# Patient Record
Sex: FEMALE | Race: WHITE | NOT HISPANIC OR LATINO | Employment: FULL TIME | URBAN - METROPOLITAN AREA
[De-identification: names, ages, dates, MRNs, and addresses within clinical notes are randomized per-mention and may not be internally consistent; named-entity substitution may affect disease eponyms.]

---

## 2017-02-03 ENCOUNTER — NON-PROVIDER VISIT (OUTPATIENT)
Dept: URGENT CARE | Facility: CLINIC | Age: 57
End: 2017-02-03

## 2017-02-03 DIAGNOSIS — Z02.1 PRE-EMPLOYMENT DRUG SCREENING: ICD-10-CM

## 2017-02-03 LAB
AMP AMPHETAMINE: NORMAL
COC COCAINE: NORMAL
INT CON NEG: NORMAL
INT CON POS: NORMAL
OPI OPIATES: NORMAL
PCP PHENCYCLIDINE: NORMAL
POC DRUG COMMENT 753798-OCCUPATIONAL HEALTH: NEGATIVE
THC: NORMAL

## 2017-02-03 PROCEDURE — 80300 POCT 5 PANEL URINE DRUG SCREEN - INSTANT: CPT | Performed by: PHYSICIAN ASSISTANT

## 2017-11-13 ENCOUNTER — APPOINTMENT (OUTPATIENT)
Dept: RADIOLOGY | Facility: IMAGING CENTER | Age: 57
End: 2017-11-13
Attending: FAMILY MEDICINE
Payer: COMMERCIAL

## 2017-11-13 ENCOUNTER — OFFICE VISIT (OUTPATIENT)
Dept: URGENT CARE | Facility: CLINIC | Age: 57
End: 2017-11-13
Payer: COMMERCIAL

## 2017-11-13 VITALS
HEART RATE: 80 BPM | WEIGHT: 194 LBS | DIASTOLIC BLOOD PRESSURE: 85 MMHG | SYSTOLIC BLOOD PRESSURE: 130 MMHG | RESPIRATION RATE: 20 BRPM | OXYGEN SATURATION: 95 % | HEIGHT: 70 IN | BODY MASS INDEX: 27.77 KG/M2 | TEMPERATURE: 97.4 F

## 2017-11-13 DIAGNOSIS — R07.81 RIB PAIN ON LEFT SIDE: ICD-10-CM

## 2017-11-13 PROCEDURE — 71101 X-RAY EXAM UNILAT RIBS/CHEST: CPT | Mod: TC,LT | Performed by: FAMILY MEDICINE

## 2017-11-13 PROCEDURE — 99203 OFFICE O/P NEW LOW 30 MIN: CPT | Performed by: FAMILY MEDICINE

## 2017-11-13 RX ORDER — LEVOTHYROXINE SODIUM 0.07 MG/1
88 TABLET ORAL
COMMUNITY
End: 2019-07-18

## 2017-11-13 ASSESSMENT — ENCOUNTER SYMPTOMS
HEMOPTYSIS: 0
FEVER: 0
CHILLS: 0
DIZZINESS: 0
COUGH: 0
SHORTNESS OF BREATH: 0
ORTHOPNEA: 0
FOCAL WEAKNESS: 0

## 2017-11-14 NOTE — PROGRESS NOTES
"Subjective:      Lillian Addison is a 57 y.o. female who presents with Rib Injury (Couple days left rib cage pain .)    Chief Complaint   Patient presents with   • Rib Injury     Couple days left rib cage pain .        - This is a very pleasant 57 y.o. female with complaints of sitting in chair and reaching for something and felt sudden pain on Lt lateral chest. Has had constant pain since, worse w/ movement/position. No NVFC          ALLERGIES:  Codeine and Sulfa drugs     PMH:  Past Medical History:   Diagnosis Date   • Hypertension         MEDS:    Current Outpatient Prescriptions:   •  levothyroxine (SYNTHROID) 75 MCG Tab, Take 75 mcg by mouth Every morning on an empty stomach., Disp: , Rfl:   •  lisinopril (PRINIVIL) 20 MG TABS, Take 20 mg by mouth every day., Disp: , Rfl:   •  hydrocodone-acetaminophen (NORCO) 5-325 MG TABS per tablet, Take 1-2 Tabs by mouth every four hours as needed., Disp: 20 Tab, Rfl: 0    ** I have documented what I find to be significant in regards to past medical, social, family and surgical history  in my HPI or under PMH/PSH/FH review section, otherwise it is contributory **           HPI    Review of Systems   Constitutional: Negative for chills and fever.   Respiratory: Negative for cough, hemoptysis and shortness of breath.    Cardiovascular: Positive for chest pain. Negative for orthopnea.   Neurological: Negative for dizziness and focal weakness.          Objective:     /85   Pulse 80   Temp 36.3 °C (97.4 °F)   Resp 20   Ht 1.778 m (5' 10\")   Wt 88 kg (194 lb)   SpO2 95%   BMI 27.84 kg/m²      Physical Exam   Constitutional: She appears well-developed. No distress.   HENT:   Head: Normocephalic and atraumatic.   Mouth/Throat: Oropharynx is clear and moist.   Eyes: Conjunctivae are normal.   Neck: Neck supple.   Cardiovascular: Regular rhythm.    No murmur heard.  Pulmonary/Chest: Effort normal. No respiratory distress.   Neurological: She is alert. She exhibits " normal muscle tone.   Skin: Skin is warm and dry.   Psychiatric: She has a normal mood and affect. Judgment normal.   Nursing note and vitals reviewed.  + TTP lateral aspect of chest. No crepitus             Assessment/Plan:         1. Rib pain on left side  [id number] (WITH 1-VIEW CXR) LEFT         * seems MSK    - rest/Motrin     Dx & d/c instructions discussed w/ patient and/or family members. Follow up w/ Prvt Dr or here in 3-4 days if not getting better, sooner if needed,  ER if worse and UC/PCP unavailable.        Possible side effects (i.e. Rash, GI upset/constipation, sedation, elevation of BP or sugars) of any medications given discussed.

## 2018-01-16 ENCOUNTER — HOSPITAL ENCOUNTER (OUTPATIENT)
Dept: HOSPITAL 8 - CFH | Age: 58
Discharge: HOME | End: 2018-01-16
Attending: FAMILY MEDICINE
Payer: COMMERCIAL

## 2018-01-16 DIAGNOSIS — N95.1: ICD-10-CM

## 2018-01-16 DIAGNOSIS — Z13.820: Primary | ICD-10-CM

## 2018-01-16 DIAGNOSIS — E04.1: ICD-10-CM

## 2018-01-16 DIAGNOSIS — R05: ICD-10-CM

## 2018-01-16 DIAGNOSIS — R06.2: ICD-10-CM

## 2018-01-16 DIAGNOSIS — Z78.0: ICD-10-CM

## 2018-01-16 PROCEDURE — 71046 X-RAY EXAM CHEST 2 VIEWS: CPT

## 2018-01-16 PROCEDURE — 77080 DXA BONE DENSITY AXIAL: CPT

## 2018-01-16 PROCEDURE — 76536 US EXAM OF HEAD AND NECK: CPT

## 2018-11-05 ENCOUNTER — HOSPITAL ENCOUNTER (INPATIENT)
Dept: HOSPITAL 8 - ED | Age: 58
LOS: 2 days | Discharge: HOME | DRG: 193 | End: 2018-11-07
Attending: INTERNAL MEDICINE | Admitting: HOSPITALIST
Payer: COMMERCIAL

## 2018-11-05 VITALS — DIASTOLIC BLOOD PRESSURE: 86 MMHG | SYSTOLIC BLOOD PRESSURE: 148 MMHG

## 2018-11-05 VITALS — HEIGHT: 70 IN | BODY MASS INDEX: 27.21 KG/M2 | WEIGHT: 190.04 LBS

## 2018-11-05 VITALS — DIASTOLIC BLOOD PRESSURE: 90 MMHG | SYSTOLIC BLOOD PRESSURE: 158 MMHG

## 2018-11-05 DIAGNOSIS — E03.9: ICD-10-CM

## 2018-11-05 DIAGNOSIS — E66.9: ICD-10-CM

## 2018-11-05 DIAGNOSIS — Z88.2: ICD-10-CM

## 2018-11-05 DIAGNOSIS — Z80.0: ICD-10-CM

## 2018-11-05 DIAGNOSIS — F17.210: ICD-10-CM

## 2018-11-05 DIAGNOSIS — J96.01: ICD-10-CM

## 2018-11-05 DIAGNOSIS — J18.9: Primary | ICD-10-CM

## 2018-11-05 DIAGNOSIS — I10: ICD-10-CM

## 2018-11-05 DIAGNOSIS — Z82.49: ICD-10-CM

## 2018-11-05 LAB
ALBUMIN SERPL-MCNC: 3.1 G/DL (ref 3.4–5)
ALP SERPL-CCNC: 97 U/L (ref 45–117)
ALT SERPL-CCNC: 25 U/L (ref 12–78)
ANION GAP SERPL CALC-SCNC: 11 MMOL/L (ref 5–15)
BASOPHILS # BLD AUTO: 0.02 X10^3/UL (ref 0–0.1)
BASOPHILS NFR BLD AUTO: 0 % (ref 0–1)
BILIRUB SERPL-MCNC: 0.6 MG/DL (ref 0.2–1)
CALCIUM SERPL-MCNC: 8.6 MG/DL (ref 8.5–10.1)
CHLORIDE SERPL-SCNC: 106 MMOL/L (ref 98–107)
CREAT SERPL-MCNC: 0.77 MG/DL (ref 0.55–1.02)
EOSINOPHIL # BLD AUTO: 0.07 X10^3/UL (ref 0–0.4)
EOSINOPHIL NFR BLD AUTO: 0 % (ref 1–7)
ERYTHROCYTE [DISTWIDTH] IN BLOOD BY AUTOMATED COUNT: 15.6 % (ref 9.6–15.2)
LYMPHOCYTES # BLD AUTO: 0.81 X10^3/UL (ref 1–3.4)
LYMPHOCYTES NFR BLD AUTO: 5 % (ref 22–44)
MCH RBC QN AUTO: 27.5 PG (ref 27–34.8)
MCHC RBC AUTO-ENTMCNC: 32.4 G/DL (ref 32.4–35.8)
MCV RBC AUTO: 84.6 FL (ref 80–100)
MD: (no result)
MONOCYTES # BLD AUTO: 0.62 X10^3/UL (ref 0.2–0.8)
MONOCYTES NFR BLD AUTO: 4 % (ref 2–9)
NEUTROPHILS # BLD AUTO: 15.97 X10^3/UL (ref 1.8–6.8)
NEUTROPHILS NFR BLD AUTO: 91 % (ref 42–75)
PLATELET # BLD AUTO: 320 X10^3/UL (ref 130–400)
PMV BLD AUTO: 8.3 FL (ref 7.4–10.4)
PROT SERPL-MCNC: 9.2 G/DL (ref 6.4–8.2)
RBC # BLD AUTO: 5.94 X10^6/UL (ref 3.82–5.3)
TROPONIN I SERPL-MCNC: < 0.015 NG/ML (ref 0–0.04)

## 2018-11-05 PROCEDURE — 84100 ASSAY OF PHOSPHORUS: CPT

## 2018-11-05 PROCEDURE — 93306 TTE W/DOPPLER COMPLETE: CPT

## 2018-11-05 PROCEDURE — 71275 CT ANGIOGRAPHY CHEST: CPT

## 2018-11-05 PROCEDURE — 83735 ASSAY OF MAGNESIUM: CPT

## 2018-11-05 PROCEDURE — 85025 COMPLETE CBC W/AUTO DIFF WBC: CPT

## 2018-11-05 PROCEDURE — 84484 ASSAY OF TROPONIN QUANT: CPT

## 2018-11-05 PROCEDURE — 99285 EMERGENCY DEPT VISIT HI MDM: CPT

## 2018-11-05 PROCEDURE — 83605 ASSAY OF LACTIC ACID: CPT

## 2018-11-05 PROCEDURE — 93005 ELECTROCARDIOGRAM TRACING: CPT

## 2018-11-05 PROCEDURE — 96374 THER/PROPH/DIAG INJ IV PUSH: CPT

## 2018-11-05 PROCEDURE — 80053 COMPREHEN METABOLIC PANEL: CPT

## 2018-11-05 PROCEDURE — 80048 BASIC METABOLIC PNL TOTAL CA: CPT

## 2018-11-05 PROCEDURE — 85379 FIBRIN DEGRADATION QUANT: CPT

## 2018-11-05 PROCEDURE — 87400 INFLUENZA A/B EACH AG IA: CPT

## 2018-11-05 PROCEDURE — 36415 COLL VENOUS BLD VENIPUNCTURE: CPT

## 2018-11-05 PROCEDURE — 87040 BLOOD CULTURE FOR BACTERIA: CPT

## 2018-11-05 RX ADMIN — AZITHROMYCIN ONE MG: 500 TABLET, FILM COATED ORAL at 15:02

## 2018-11-05 RX ADMIN — METHYLPREDNISOLONE SODIUM SUCCINATE SCH MG: 125 INJECTION, POWDER, FOR SOLUTION INTRAMUSCULAR; INTRAVENOUS at 19:32

## 2018-11-05 RX ADMIN — ENOXAPARIN SODIUM SCH MG: 40 INJECTION SUBCUTANEOUS at 19:30

## 2018-11-05 RX ADMIN — SODIUM CHLORIDE SCH MLS/HR: 0.9 INJECTION, SOLUTION INTRAVENOUS at 16:18

## 2018-11-05 RX ADMIN — AZITHROMYCIN ONE MG: 500 TABLET, FILM COATED ORAL at 14:41

## 2018-11-06 VITALS — SYSTOLIC BLOOD PRESSURE: 123 MMHG | DIASTOLIC BLOOD PRESSURE: 75 MMHG

## 2018-11-06 VITALS — DIASTOLIC BLOOD PRESSURE: 81 MMHG | SYSTOLIC BLOOD PRESSURE: 151 MMHG

## 2018-11-06 VITALS — DIASTOLIC BLOOD PRESSURE: 78 MMHG | SYSTOLIC BLOOD PRESSURE: 132 MMHG

## 2018-11-06 VITALS — SYSTOLIC BLOOD PRESSURE: 146 MMHG | DIASTOLIC BLOOD PRESSURE: 83 MMHG

## 2018-11-06 LAB
ANION GAP SERPL CALC-SCNC: 7 MMOL/L (ref 5–15)
BASOPHILS # BLD AUTO: 0.03 X10^3/UL (ref 0–0.1)
BASOPHILS NFR BLD AUTO: 0 % (ref 0–1)
CALCIUM SERPL-MCNC: 8.5 MG/DL (ref 8.5–10.1)
CHLORIDE SERPL-SCNC: 108 MMOL/L (ref 98–107)
CREAT SERPL-MCNC: 0.67 MG/DL (ref 0.55–1.02)
EOSINOPHIL # BLD AUTO: 0 X10^3/UL (ref 0–0.4)
EOSINOPHIL NFR BLD AUTO: 0 % (ref 1–7)
ERYTHROCYTE [DISTWIDTH] IN BLOOD BY AUTOMATED COUNT: 15.3 % (ref 9.6–15.2)
LYMPHOCYTES # BLD AUTO: 0.59 X10^3/UL (ref 1–3.4)
LYMPHOCYTES NFR BLD AUTO: 3 % (ref 22–44)
MCH RBC QN AUTO: 27.8 PG (ref 27–34.8)
MCHC RBC AUTO-ENTMCNC: 32.8 G/DL (ref 32.4–35.8)
MCV RBC AUTO: 84.8 FL (ref 80–100)
MD: (no result)
MONOCYTES # BLD AUTO: 0.07 X10^3/UL (ref 0.2–0.8)
MONOCYTES NFR BLD AUTO: 0 % (ref 2–9)
NEUTROPHILS # BLD AUTO: 16.53 X10^3/UL (ref 1.8–6.8)
NEUTROPHILS NFR BLD AUTO: 96 % (ref 42–75)
PLATELET # BLD AUTO: 332 X10^3/UL (ref 130–400)
PMV BLD AUTO: 7.7 FL (ref 7.4–10.4)
RBC # BLD AUTO: 5.44 X10^6/UL (ref 3.82–5.3)

## 2018-11-06 RX ADMIN — SODIUM CHLORIDE SCH MLS/HR: 0.9 INJECTION, SOLUTION INTRAVENOUS at 02:44

## 2018-11-06 RX ADMIN — LISINOPRIL SCH MG: 20 TABLET ORAL at 09:03

## 2018-11-06 RX ADMIN — METHYLPREDNISOLONE SODIUM SUCCINATE SCH MG: 125 INJECTION, POWDER, FOR SOLUTION INTRAMUSCULAR; INTRAVENOUS at 02:44

## 2018-11-06 RX ADMIN — METHYLPREDNISOLONE SODIUM SUCCINATE SCH MG: 125 INJECTION, POWDER, FOR SOLUTION INTRAMUSCULAR; INTRAVENOUS at 09:03

## 2018-11-06 RX ADMIN — Medication SCH CAP: at 09:02

## 2018-11-06 RX ADMIN — Medication SCH TAB: at 09:02

## 2018-11-06 RX ADMIN — LEVOTHYROXINE SODIUM SCH MCG: 88 TABLET ORAL at 09:00

## 2018-11-06 RX ADMIN — ENOXAPARIN SODIUM SCH MG: 40 INJECTION SUBCUTANEOUS at 19:10

## 2018-11-07 VITALS — DIASTOLIC BLOOD PRESSURE: 86 MMHG | SYSTOLIC BLOOD PRESSURE: 145 MMHG

## 2018-11-07 VITALS — SYSTOLIC BLOOD PRESSURE: 139 MMHG | DIASTOLIC BLOOD PRESSURE: 82 MMHG

## 2018-11-07 RX ADMIN — LEVOTHYROXINE SODIUM SCH MCG: 88 TABLET ORAL at 08:38

## 2018-11-07 RX ADMIN — Medication SCH CAP: at 08:37

## 2018-11-07 RX ADMIN — Medication SCH TAB: at 08:37

## 2018-11-07 RX ADMIN — LISINOPRIL SCH MG: 20 TABLET ORAL at 08:38

## 2019-03-23 ENCOUNTER — HOSPITAL ENCOUNTER (EMERGENCY)
Dept: HOSPITAL 8 - ED | Age: 59
Discharge: HOME | End: 2019-03-23
Payer: COMMERCIAL

## 2019-03-23 VITALS — BODY MASS INDEX: 26.83 KG/M2 | WEIGHT: 187.39 LBS | HEIGHT: 70 IN

## 2019-03-23 VITALS — DIASTOLIC BLOOD PRESSURE: 77 MMHG | SYSTOLIC BLOOD PRESSURE: 155 MMHG

## 2019-03-23 DIAGNOSIS — I10: ICD-10-CM

## 2019-03-23 DIAGNOSIS — Z87.891: ICD-10-CM

## 2019-03-23 DIAGNOSIS — J45.901: Primary | ICD-10-CM

## 2019-03-23 DIAGNOSIS — R09.02: ICD-10-CM

## 2019-03-23 LAB
ALBUMIN SERPL-MCNC: 3.1 G/DL (ref 3.4–5)
ALP SERPL-CCNC: 70 U/L (ref 45–117)
ALT SERPL-CCNC: 30 U/L (ref 12–78)
ANION GAP SERPL CALC-SCNC: 6 MMOL/L (ref 5–15)
BASOPHILS # BLD AUTO: 0.01 X10^3/UL (ref 0–0.1)
BASOPHILS NFR BLD AUTO: 0 % (ref 0–1)
BILIRUB SERPL-MCNC: 0.4 MG/DL (ref 0.2–1)
CALCIUM SERPL-MCNC: 8.9 MG/DL (ref 8.5–10.1)
CHLORIDE SERPL-SCNC: 108 MMOL/L (ref 98–107)
CREAT SERPL-MCNC: 0.82 MG/DL (ref 0.55–1.02)
EOSINOPHIL # BLD AUTO: 0.15 X10^3/UL (ref 0–0.4)
EOSINOPHIL NFR BLD AUTO: 1 % (ref 1–7)
ERYTHROCYTE [DISTWIDTH] IN BLOOD BY AUTOMATED COUNT: 14.1 % (ref 9.6–15.2)
LYMPHOCYTES # BLD AUTO: 0.81 X10^3/UL (ref 1–3.4)
LYMPHOCYTES NFR BLD AUTO: 5 % (ref 22–44)
MCH RBC QN AUTO: 29.5 PG (ref 27–34.8)
MCHC RBC AUTO-ENTMCNC: 32.5 G/DL (ref 32.4–35.8)
MCV RBC AUTO: 90.8 FL (ref 80–100)
MD: (no result)
MONOCYTES # BLD AUTO: 0.65 X10^3/UL (ref 0.2–0.8)
MONOCYTES NFR BLD AUTO: 4 % (ref 2–9)
NEUTROPHILS # BLD AUTO: 16.05 X10^3/UL (ref 1.8–6.8)
NEUTROPHILS NFR BLD AUTO: 91 % (ref 42–75)
PLATELET # BLD AUTO: 371 X10^3/UL (ref 130–400)
PMV BLD AUTO: 7.3 FL (ref 7.4–10.4)
PROT SERPL-MCNC: 8.2 G/DL (ref 6.4–8.2)
RBC # BLD AUTO: 5.51 X10^6/UL (ref 3.82–5.3)
TROPONIN I SERPL-MCNC: < 0.015 NG/ML (ref 0–0.04)

## 2019-03-23 PROCEDURE — 93005 ELECTROCARDIOGRAM TRACING: CPT

## 2019-03-23 PROCEDURE — 99284 EMERGENCY DEPT VISIT MOD MDM: CPT

## 2019-03-23 PROCEDURE — 94640 AIRWAY INHALATION TREATMENT: CPT

## 2019-03-23 PROCEDURE — 84484 ASSAY OF TROPONIN QUANT: CPT

## 2019-03-23 PROCEDURE — 36415 COLL VENOUS BLD VENIPUNCTURE: CPT

## 2019-03-23 PROCEDURE — 85025 COMPLETE CBC W/AUTO DIFF WBC: CPT

## 2019-03-23 PROCEDURE — 83690 ASSAY OF LIPASE: CPT

## 2019-03-23 PROCEDURE — 80053 COMPREHEN METABOLIC PANEL: CPT

## 2019-03-23 PROCEDURE — 71045 X-RAY EXAM CHEST 1 VIEW: CPT

## 2019-03-23 NOTE — NUR
pt 86% on RA placed on 3L O2, sats >90%. pt denies cp, just feels sob, 
especially with exertion.  pt was seen here in november and diagnosed with 
pneumonitis. has seen [facility] np and was given a few meds. she stopped taking 
the simbicort she was prescribed because of the side effects, started retaking 
them on monday because she was feeling so sob.

## 2019-04-19 ENCOUNTER — APPOINTMENT (OUTPATIENT)
Dept: RADIOLOGY | Facility: MEDICAL CENTER | Age: 59
DRG: 853 | End: 2019-04-19
Attending: EMERGENCY MEDICINE
Payer: COMMERCIAL

## 2019-04-19 ENCOUNTER — HOSPITAL ENCOUNTER (INPATIENT)
Facility: MEDICAL CENTER | Age: 59
LOS: 5 days | DRG: 853 | End: 2019-04-24
Attending: EMERGENCY MEDICINE | Admitting: HOSPITALIST
Payer: COMMERCIAL

## 2019-04-19 ENCOUNTER — APPOINTMENT (OUTPATIENT)
Dept: RADIOLOGY | Facility: MEDICAL CENTER | Age: 59
DRG: 853 | End: 2019-04-19
Attending: HOSPITALIST
Payer: COMMERCIAL

## 2019-04-19 DIAGNOSIS — R05.9 COUGH: ICD-10-CM

## 2019-04-19 DIAGNOSIS — J18.9 PNEUMONIA DUE TO INFECTIOUS ORGANISM, UNSPECIFIED LATERALITY, UNSPECIFIED PART OF LUNG: Primary | ICD-10-CM

## 2019-04-19 DIAGNOSIS — R06.00 DYSPNEA, UNSPECIFIED TYPE: ICD-10-CM

## 2019-04-19 DIAGNOSIS — J45.40 MODERATE PERSISTENT ASTHMA, UNSPECIFIED WHETHER COMPLICATED: ICD-10-CM

## 2019-04-19 DIAGNOSIS — R79.89 ELEVATED TROPONIN: ICD-10-CM

## 2019-04-19 DIAGNOSIS — R09.02 HYPOXIA: ICD-10-CM

## 2019-04-19 PROBLEM — R06.03 ACUTE RESPIRATORY DISTRESS: Status: ACTIVE | Noted: 2019-04-19

## 2019-04-19 PROBLEM — E87.20 LACTIC ACID ACIDOSIS: Status: ACTIVE | Noted: 2019-04-19

## 2019-04-19 PROBLEM — A41.9 SEPTIC SHOCK (HCC): Status: ACTIVE | Noted: 2019-04-19

## 2019-04-19 PROBLEM — I24.89 DEMAND ISCHEMIA (HCC): Status: ACTIVE | Noted: 2019-04-19

## 2019-04-19 PROBLEM — R65.21 SEPTIC SHOCK (HCC): Status: ACTIVE | Noted: 2019-04-19

## 2019-04-19 LAB
ALBUMIN SERPL BCP-MCNC: 3.3 G/DL (ref 3.2–4.9)
ALBUMIN/GLOB SERPL: 0.7 G/DL
ALP SERPL-CCNC: 47 U/L (ref 30–99)
ALT SERPL-CCNC: 23 U/L (ref 2–50)
ANION GAP SERPL CALC-SCNC: 11 MMOL/L (ref 0–11.9)
ANION GAP SERPL CALC-SCNC: 12 MMOL/L (ref 0–11.9)
AST SERPL-CCNC: 46 U/L (ref 12–45)
BASE EXCESS BLDA CALC-SCNC: -3 MMOL/L (ref -4–3)
BASOPHILS # BLD AUTO: 0.5 % (ref 0–1.8)
BASOPHILS # BLD: 0.16 K/UL (ref 0–0.12)
BILIRUB SERPL-MCNC: 0.8 MG/DL (ref 0.1–1.5)
BNP SERPL-MCNC: 272 PG/ML (ref 0–100)
BODY TEMPERATURE: ABNORMAL CENTIGRADE
BUN SERPL-MCNC: 15 MG/DL (ref 8–22)
BUN SERPL-MCNC: 19 MG/DL (ref 8–22)
CALCIUM SERPL-MCNC: 7.8 MG/DL (ref 8.4–10.2)
CALCIUM SERPL-MCNC: 8.8 MG/DL (ref 8.4–10.2)
CHLORIDE SERPL-SCNC: 102 MMOL/L (ref 96–112)
CHLORIDE SERPL-SCNC: 106 MMOL/L (ref 96–112)
CO2 SERPL-SCNC: 18 MMOL/L (ref 20–33)
CO2 SERPL-SCNC: 22 MMOL/L (ref 20–33)
CREAT SERPL-MCNC: 0.85 MG/DL (ref 0.5–1.4)
CREAT SERPL-MCNC: 0.9 MG/DL (ref 0.5–1.4)
EKG IMPRESSION: NORMAL
EOSINOPHIL # BLD AUTO: 0 K/UL (ref 0–0.51)
EOSINOPHIL NFR BLD: 0 % (ref 0–6.9)
ERYTHROCYTE [DISTWIDTH] IN BLOOD BY AUTOMATED COUNT: 44.3 FL (ref 35.9–50)
FLUAV RNA SPEC QL NAA+PROBE: NEGATIVE
FLUBV RNA SPEC QL NAA+PROBE: NEGATIVE
GLOBULIN SER CALC-MCNC: 4.9 G/DL (ref 1.9–3.5)
GLUCOSE SERPL-MCNC: 190 MG/DL (ref 65–99)
GLUCOSE SERPL-MCNC: 239 MG/DL (ref 65–99)
HCO3 BLDA-SCNC: 20 MMOL/L (ref 17–25)
HCT VFR BLD AUTO: 49.6 % (ref 37–47)
HGB BLD-MCNC: 16.5 G/DL (ref 12–16)
IMM GRANULOCYTES # BLD AUTO: 0.21 K/UL (ref 0–0.11)
IMM GRANULOCYTES NFR BLD AUTO: 0.7 % (ref 0–0.9)
LACTATE BLD-SCNC: 1.7 MMOL/L (ref 0.5–2)
LACTATE BLD-SCNC: 2.3 MMOL/L (ref 0.5–2)
LACTATE BLD-SCNC: 2.5 MMOL/L (ref 0.5–2)
LACTATE BLD-SCNC: 3.1 MMOL/L (ref 0.5–2)
LACTATE BLD-SCNC: 4.3 MMOL/L (ref 0.5–2)
LACTATE BLD-SCNC: 4.6 MMOL/L (ref 0.5–2)
LYMPHOCYTES # BLD AUTO: 0.49 K/UL (ref 1–4.8)
LYMPHOCYTES NFR BLD: 1.7 % (ref 22–41)
MCH RBC QN AUTO: 29.5 PG (ref 27–33)
MCHC RBC AUTO-ENTMCNC: 33.3 G/DL (ref 33.6–35)
MCV RBC AUTO: 88.7 FL (ref 81.4–97.8)
MONOCYTES # BLD AUTO: 0.69 K/UL (ref 0–0.85)
MONOCYTES NFR BLD AUTO: 2.4 % (ref 0–13.4)
NEUTROPHILS # BLD AUTO: 27.69 K/UL (ref 2–7.15)
NEUTROPHILS NFR BLD: 94.7 % (ref 44–72)
NRBC # BLD AUTO: 0 K/UL
NRBC BLD-RTO: 0 /100 WBC
PCO2 BLDA: 29.2 MMHG (ref 26–37)
PH BLDA: 7.44 [PH] (ref 7.4–7.5)
PLATELET # BLD AUTO: 261 K/UL (ref 164–446)
PMV BLD AUTO: 9.3 FL (ref 9–12.9)
PO2 BLDA: 48.1 MMHG (ref 64–87)
POTASSIUM SERPL-SCNC: 3.5 MMOL/L (ref 3.6–5.5)
POTASSIUM SERPL-SCNC: 3.5 MMOL/L (ref 3.6–5.5)
PROT SERPL-MCNC: 8.2 G/DL (ref 6–8.2)
RBC # BLD AUTO: 5.59 M/UL (ref 4.2–5.4)
SAO2 % BLDA: 82.7 % (ref 93–99)
SODIUM SERPL-SCNC: 135 MMOL/L (ref 135–145)
SODIUM SERPL-SCNC: 136 MMOL/L (ref 135–145)
TROPONIN I SERPL-MCNC: 0.07 NG/ML (ref 0–0.04)
TROPONIN I SERPL-MCNC: 0.12 NG/ML (ref 0–0.04)
TROPONIN I SERPL-MCNC: 0.13 NG/ML (ref 0–0.04)
TROPONIN I SERPL-MCNC: 0.14 NG/ML (ref 0–0.04)
TROPONIN I SERPL-MCNC: 0.14 NG/ML (ref 0–0.04)
TROPONIN I SERPL-MCNC: 0.15 NG/ML (ref 0–0.04)
TROPONIN I SERPL-MCNC: 0.17 NG/ML (ref 0–0.04)
WBC # BLD AUTO: 29.2 K/UL (ref 4.8–10.8)

## 2019-04-19 PROCEDURE — 700102 HCHG RX REV CODE 250 W/ 637 OVERRIDE(OP): Performed by: INTERNAL MEDICINE

## 2019-04-19 PROCEDURE — 700105 HCHG RX REV CODE 258: Performed by: HOSPITALIST

## 2019-04-19 PROCEDURE — 94640 AIRWAY INHALATION TREATMENT: CPT

## 2019-04-19 PROCEDURE — 99291 CRITICAL CARE FIRST HOUR: CPT | Performed by: HOSPITALIST

## 2019-04-19 PROCEDURE — 71045 X-RAY EXAM CHEST 1 VIEW: CPT

## 2019-04-19 PROCEDURE — 94760 N-INVAS EAR/PLS OXIMETRY 1: CPT

## 2019-04-19 PROCEDURE — 96365 THER/PROPH/DIAG IV INF INIT: CPT

## 2019-04-19 PROCEDURE — 71275 CT ANGIOGRAPHY CHEST: CPT

## 2019-04-19 PROCEDURE — 87502 INFLUENZA DNA AMP PROBE: CPT

## 2019-04-19 PROCEDURE — 36600 WITHDRAWAL OF ARTERIAL BLOOD: CPT

## 2019-04-19 PROCEDURE — 770022 HCHG ROOM/CARE - ICU (200)

## 2019-04-19 PROCEDURE — 96368 THER/DIAG CONCURRENT INF: CPT

## 2019-04-19 PROCEDURE — 99291 CRITICAL CARE FIRST HOUR: CPT

## 2019-04-19 PROCEDURE — 700111 HCHG RX REV CODE 636 W/ 250 OVERRIDE (IP): Performed by: EMERGENCY MEDICINE

## 2019-04-19 PROCEDURE — 83605 ASSAY OF LACTIC ACID: CPT | Mod: 91

## 2019-04-19 PROCEDURE — 96367 TX/PROPH/DG ADDL SEQ IV INF: CPT

## 2019-04-19 PROCEDURE — 83880 ASSAY OF NATRIURETIC PEPTIDE: CPT

## 2019-04-19 PROCEDURE — 93005 ELECTROCARDIOGRAM TRACING: CPT | Performed by: EMERGENCY MEDICINE

## 2019-04-19 PROCEDURE — 700111 HCHG RX REV CODE 636 W/ 250 OVERRIDE (IP): Performed by: INTERNAL MEDICINE

## 2019-04-19 PROCEDURE — 700101 HCHG RX REV CODE 250: Performed by: HOSPITALIST

## 2019-04-19 PROCEDURE — 700111 HCHG RX REV CODE 636 W/ 250 OVERRIDE (IP): Performed by: HOSPITALIST

## 2019-04-19 PROCEDURE — A9270 NON-COVERED ITEM OR SERVICE: HCPCS | Performed by: INTERNAL MEDICINE

## 2019-04-19 PROCEDURE — 99233 SBSQ HOSP IP/OBS HIGH 50: CPT | Performed by: INTERNAL MEDICINE

## 2019-04-19 PROCEDURE — 700117 HCHG RX CONTRAST REV CODE 255: Performed by: EMERGENCY MEDICINE

## 2019-04-19 PROCEDURE — 96375 TX/PRO/DX INJ NEW DRUG ADDON: CPT

## 2019-04-19 PROCEDURE — 84484 ASSAY OF TROPONIN QUANT: CPT | Mod: 91

## 2019-04-19 PROCEDURE — 82803 BLOOD GASES ANY COMBINATION: CPT

## 2019-04-19 PROCEDURE — 85025 COMPLETE CBC W/AUTO DIFF WBC: CPT

## 2019-04-19 PROCEDURE — 700105 HCHG RX REV CODE 258: Performed by: EMERGENCY MEDICINE

## 2019-04-19 PROCEDURE — 87040 BLOOD CULTURE FOR BACTERIA: CPT | Mod: 91

## 2019-04-19 PROCEDURE — 700101 HCHG RX REV CODE 250: Performed by: EMERGENCY MEDICINE

## 2019-04-19 PROCEDURE — 700102 HCHG RX REV CODE 250 W/ 637 OVERRIDE(OP): Performed by: HOSPITALIST

## 2019-04-19 PROCEDURE — 80048 BASIC METABOLIC PNL TOTAL CA: CPT

## 2019-04-19 PROCEDURE — 80053 COMPREHEN METABOLIC PANEL: CPT

## 2019-04-19 PROCEDURE — 84145 PROCALCITONIN (PCT): CPT

## 2019-04-19 PROCEDURE — A9270 NON-COVERED ITEM OR SERVICE: HCPCS | Performed by: HOSPITALIST

## 2019-04-19 RX ORDER — AZITHROMYCIN 250 MG/1
250 TABLET, FILM COATED ORAL DAILY
Status: COMPLETED | OUTPATIENT
Start: 2019-04-20 | End: 2019-04-23

## 2019-04-19 RX ORDER — SODIUM CHLORIDE 9 MG/ML
1000 INJECTION, SOLUTION INTRAVENOUS ONCE
Status: COMPLETED | OUTPATIENT
Start: 2019-04-19 | End: 2019-04-19

## 2019-04-19 RX ORDER — ACETAMINOPHEN 325 MG/1
650 TABLET ORAL EVERY 6 HOURS PRN
Status: DISCONTINUED | OUTPATIENT
Start: 2019-04-19 | End: 2019-04-24 | Stop reason: HOSPADM

## 2019-04-19 RX ORDER — LEVOTHYROXINE SODIUM 88 UG/1
88 TABLET ORAL
Status: DISCONTINUED | OUTPATIENT
Start: 2019-04-19 | End: 2019-04-24 | Stop reason: HOSPADM

## 2019-04-19 RX ORDER — POLYETHYLENE GLYCOL 3350 17 G/17G
1 POWDER, FOR SOLUTION ORAL
Status: DISCONTINUED | OUTPATIENT
Start: 2019-04-19 | End: 2019-04-24 | Stop reason: HOSPADM

## 2019-04-19 RX ORDER — GUAIFENESIN/DEXTROMETHORPHAN 100-10MG/5
10 SYRUP ORAL EVERY 6 HOURS PRN
Status: DISCONTINUED | OUTPATIENT
Start: 2019-04-19 | End: 2019-04-24 | Stop reason: HOSPADM

## 2019-04-19 RX ORDER — MAGNESIUM GLUCONATE 27 MG(500)
500 TABLET ORAL DAILY
COMMUNITY

## 2019-04-19 RX ORDER — FUROSEMIDE 10 MG/ML
20 INJECTION INTRAMUSCULAR; INTRAVENOUS ONCE
Status: COMPLETED | OUTPATIENT
Start: 2019-04-20 | End: 2019-04-19

## 2019-04-19 RX ORDER — IPRATROPIUM BROMIDE AND ALBUTEROL SULFATE 2.5; .5 MG/3ML; MG/3ML
3 SOLUTION RESPIRATORY (INHALATION)
Status: DISCONTINUED | OUTPATIENT
Start: 2019-04-19 | End: 2019-04-24 | Stop reason: HOSPADM

## 2019-04-19 RX ORDER — SODIUM CHLORIDE 9 MG/ML
INJECTION, SOLUTION INTRAVENOUS CONTINUOUS
Status: DISCONTINUED | OUTPATIENT
Start: 2019-04-19 | End: 2019-04-19

## 2019-04-19 RX ORDER — METHYLPREDNISOLONE SODIUM SUCCINATE 125 MG/2ML
125 INJECTION, POWDER, LYOPHILIZED, FOR SOLUTION INTRAMUSCULAR; INTRAVENOUS ONCE
Status: COMPLETED | OUTPATIENT
Start: 2019-04-19 | End: 2019-04-19

## 2019-04-19 RX ORDER — ALBUTEROL SULFATE 90 UG/1
2 AEROSOL, METERED RESPIRATORY (INHALATION) EVERY 6 HOURS PRN
COMMUNITY

## 2019-04-19 RX ORDER — BISACODYL 10 MG
10 SUPPOSITORY, RECTAL RECTAL
Status: DISCONTINUED | OUTPATIENT
Start: 2019-04-19 | End: 2019-04-24 | Stop reason: HOSPADM

## 2019-04-19 RX ORDER — METHYLPREDNISOLONE SODIUM SUCCINATE 40 MG/ML
40 INJECTION, POWDER, LYOPHILIZED, FOR SOLUTION INTRAMUSCULAR; INTRAVENOUS EVERY 8 HOURS
Status: DISCONTINUED | OUTPATIENT
Start: 2019-04-19 | End: 2019-04-22

## 2019-04-19 RX ORDER — AMOXICILLIN 250 MG
2 CAPSULE ORAL 2 TIMES DAILY
Status: DISCONTINUED | OUTPATIENT
Start: 2019-04-19 | End: 2019-04-24 | Stop reason: HOSPADM

## 2019-04-19 RX ORDER — CHOLECALCIFEROL (VITAMIN D3) 125 MCG
500 CAPSULE ORAL DAILY
COMMUNITY

## 2019-04-19 RX ORDER — MAGNESIUM SULFATE HEPTAHYDRATE 40 MG/ML
2 INJECTION, SOLUTION INTRAVENOUS ONCE
Status: COMPLETED | OUTPATIENT
Start: 2019-04-19 | End: 2019-04-19

## 2019-04-19 RX ORDER — BENZONATATE 100 MG/1
100 CAPSULE ORAL 3 TIMES DAILY PRN
Status: DISCONTINUED | OUTPATIENT
Start: 2019-04-19 | End: 2019-04-24 | Stop reason: HOSPADM

## 2019-04-19 RX ORDER — IPRATROPIUM BROMIDE AND ALBUTEROL SULFATE 2.5; .5 MG/3ML; MG/3ML
3 SOLUTION RESPIRATORY (INHALATION) ONCE
Status: COMPLETED | OUTPATIENT
Start: 2019-04-19 | End: 2019-04-19

## 2019-04-19 RX ORDER — BUDESONIDE AND FORMOTEROL FUMARATE DIHYDRATE 80; 4.5 UG/1; UG/1
2 AEROSOL RESPIRATORY (INHALATION)
Status: DISCONTINUED | OUTPATIENT
Start: 2019-04-19 | End: 2019-04-22

## 2019-04-19 RX ORDER — M-VIT,TX,IRON,MINS/CALC/FOLIC 27MG-0.4MG
1 TABLET ORAL DAILY
COMMUNITY

## 2019-04-19 RX ORDER — CHLORAL HYDRATE 500 MG
1000 CAPSULE ORAL DAILY
COMMUNITY

## 2019-04-19 RX ORDER — SODIUM CHLORIDE 9 MG/ML
INJECTION, SOLUTION INTRAVENOUS ONCE
Status: COMPLETED | OUTPATIENT
Start: 2019-04-19 | End: 2019-04-19

## 2019-04-19 RX ADMIN — SODIUM CHLORIDE 1000 ML: 9 INJECTION, SOLUTION INTRAVENOUS at 04:00

## 2019-04-19 RX ADMIN — IOHEXOL 80 ML: 350 INJECTION, SOLUTION INTRAVENOUS at 06:38

## 2019-04-19 RX ADMIN — GUAIFENESIN AND DEXTROMETHORPHAN 10 ML: 100; 10 SYRUP ORAL at 12:09

## 2019-04-19 RX ADMIN — BUDESONIDE AND FORMOTEROL FUMARATE DIHYDRATE 2 PUFF: 80; 4.5 AEROSOL RESPIRATORY (INHALATION) at 07:24

## 2019-04-19 RX ADMIN — IPRATROPIUM BROMIDE AND ALBUTEROL SULFATE 3 ML: .5; 3 SOLUTION RESPIRATORY (INHALATION) at 05:25

## 2019-04-19 RX ADMIN — FUROSEMIDE 20 MG: 10 INJECTION, SOLUTION INTRAVENOUS at 23:46

## 2019-04-19 RX ADMIN — IPRATROPIUM BROMIDE AND ALBUTEROL SULFATE 3 ML: .5; 3 SOLUTION RESPIRATORY (INHALATION) at 03:39

## 2019-04-19 RX ADMIN — AZITHROMYCIN MONOHYDRATE 500 MG: 500 INJECTION, POWDER, LYOPHILIZED, FOR SOLUTION INTRAVENOUS at 04:27

## 2019-04-19 RX ADMIN — IPRATROPIUM BROMIDE AND ALBUTEROL SULFATE 3 ML: .5; 3 SOLUTION RESPIRATORY (INHALATION) at 10:43

## 2019-04-19 RX ADMIN — MAGNESIUM SULFATE HEPTAHYDRATE 2 G: 40 INJECTION, SOLUTION INTRAVENOUS at 03:56

## 2019-04-19 RX ADMIN — SODIUM CHLORIDE: 9 INJECTION, SOLUTION INTRAVENOUS at 14:46

## 2019-04-19 RX ADMIN — BENZONATATE 100 MG: 100 CAPSULE ORAL at 21:33

## 2019-04-19 RX ADMIN — SODIUM CHLORIDE 1000 ML: 9 INJECTION, SOLUTION INTRAVENOUS at 04:44

## 2019-04-19 RX ADMIN — BENZOCAINE AND MENTHOL 1 LOZENGE: 15; 3.6 LOZENGE ORAL at 16:37

## 2019-04-19 RX ADMIN — SODIUM CHLORIDE: 9 INJECTION, SOLUTION INTRAVENOUS at 07:17

## 2019-04-19 RX ADMIN — IPRATROPIUM BROMIDE AND ALBUTEROL SULFATE 3 ML: .5; 3 SOLUTION RESPIRATORY (INHALATION) at 07:24

## 2019-04-19 RX ADMIN — LEVOTHYROXINE SODIUM 88 MCG: 88 TABLET ORAL at 07:40

## 2019-04-19 RX ADMIN — BUDESONIDE AND FORMOTEROL FUMARATE DIHYDRATE 2 PUFF: 80; 4.5 AEROSOL RESPIRATORY (INHALATION) at 19:39

## 2019-04-19 RX ADMIN — SODIUM CHLORIDE: 9 INJECTION, SOLUTION INTRAVENOUS at 09:23

## 2019-04-19 RX ADMIN — ACETAMINOPHEN 650 MG: 325 TABLET, FILM COATED ORAL at 12:09

## 2019-04-19 RX ADMIN — IPRATROPIUM BROMIDE AND ALBUTEROL SULFATE 3 ML: .5; 3 SOLUTION RESPIRATORY (INHALATION) at 19:37

## 2019-04-19 RX ADMIN — IPRATROPIUM BROMIDE AND ALBUTEROL SULFATE 3 ML: .5; 3 SOLUTION RESPIRATORY (INHALATION) at 15:03

## 2019-04-19 RX ADMIN — CEFTRIAXONE SODIUM 2 G: 2 INJECTION, POWDER, FOR SOLUTION INTRAMUSCULAR; INTRAVENOUS at 03:55

## 2019-04-19 RX ADMIN — GUAIFENESIN AND DEXTROMETHORPHAN 10 ML: 100; 10 SYRUP ORAL at 19:23

## 2019-04-19 RX ADMIN — METHYLPREDNISOLONE SODIUM SUCCINATE 40 MG: 40 INJECTION, POWDER, FOR SOLUTION INTRAMUSCULAR; INTRAVENOUS at 21:35

## 2019-04-19 RX ADMIN — BENZOCAINE AND MENTHOL 1 LOZENGE: 15; 3.6 LOZENGE ORAL at 18:41

## 2019-04-19 RX ADMIN — METHYLPREDNISOLONE SODIUM SUCCINATE 125 MG: 125 INJECTION, POWDER, FOR SOLUTION INTRAMUSCULAR; INTRAVENOUS at 03:31

## 2019-04-19 ASSESSMENT — COGNITIVE AND FUNCTIONAL STATUS - GENERAL
SUGGESTED CMS G CODE MODIFIER DAILY ACTIVITY: CH
DAILY ACTIVITIY SCORE: 24
SUGGESTED CMS G CODE MODIFIER MOBILITY: CH
MOBILITY SCORE: 24

## 2019-04-19 ASSESSMENT — LIFESTYLE VARIABLES
AVERAGE NUMBER OF DAYS PER WEEK YOU HAVE A DRINK CONTAINING ALCOHOL: 2
HOW MANY TIMES IN THE PAST YEAR HAVE YOU HAD 5 OR MORE DRINKS IN A DAY: 0
TOTAL SCORE: 0
EVER HAD A DRINK FIRST THING IN THE MORNING TO STEADY YOUR NERVES TO GET RID OF A HANGOVER: NO
ALCOHOL_USE: YES
HAVE PEOPLE ANNOYED YOU BY CRITICIZING YOUR DRINKING: NO
ON A TYPICAL DAY WHEN YOU DRINK ALCOHOL HOW MANY DRINKS DO YOU HAVE: 2
CONSUMPTION TOTAL: INCOMPLETE
ON A TYPICAL DAY WHEN YOU DRINK ALCOHOL HOW MANY DRINKS DO YOU HAVE: 2
EVER_SMOKED: YES
ON A TYPICAL DAY WHEN YOU DRINK ALCOHOL HOW MANY DRINKS DO YOU HAVE: 2
TOTAL SCORE: 0
EVER HAD A DRINK FIRST THING IN THE MORNING TO STEADY YOUR NERVES TO GET RID OF A HANGOVER: NO
HAVE PEOPLE ANNOYED YOU BY CRITICIZING YOUR DRINKING: NO
HAVE YOU EVER FELT YOU SHOULD CUT DOWN ON YOUR DRINKING: NO
HAVE YOU EVER FELT YOU SHOULD CUT DOWN ON YOUR DRINKING: NO
HOW MANY TIMES IN THE PAST YEAR HAVE YOU HAD 5 OR MORE DRINKS IN A DAY: 0
ALCOHOL_USE: YES
CONSUMPTION TOTAL: NEGATIVE
EVER FELT BAD OR GUILTY ABOUT YOUR DRINKING: NO
AVERAGE NUMBER OF DAYS PER WEEK YOU HAVE A DRINK CONTAINING ALCOHOL: 2
CONSUMPTION TOTAL: INCOMPLETE
EVER HAD A DRINK FIRST THING IN THE MORNING TO STEADY YOUR NERVES TO GET RID OF A HANGOVER: NO
ALCOHOL_USE: YES
HAVE PEOPLE ANNOYED YOU BY CRITICIZING YOUR DRINKING: NO
HAVE YOU EVER FELT YOU SHOULD CUT DOWN ON YOUR DRINKING: NO
TOTAL SCORE: 0
EVER FELT BAD OR GUILTY ABOUT YOUR DRINKING: NO
AVERAGE NUMBER OF DAYS PER WEEK YOU HAVE A DRINK CONTAINING ALCOHOL: 2
HOW MANY TIMES IN THE PAST YEAR HAVE YOU HAD 5 OR MORE DRINKS IN A DAY: 0

## 2019-04-19 ASSESSMENT — ENCOUNTER SYMPTOMS
SORE THROAT: 1
ABDOMINAL PAIN: 0
NEUROLOGICAL NEGATIVE: 1
HEMOPTYSIS: 0
VOMITING: 0
PSYCHIATRIC NEGATIVE: 1
NECK PAIN: 0
DIZZINESS: 0
SHORTNESS OF BREATH: 1
SINUS PAIN: 1
DOUBLE VISION: 0
WEAKNESS: 1
NAUSEA: 0
FEVER: 1
MUSCULOSKELETAL NEGATIVE: 1
BLURRED VISION: 0
WHEEZING: 0
SPUTUM PRODUCTION: 0
PALPITATIONS: 0
GASTROINTESTINAL NEGATIVE: 1
HEADACHES: 0
CHILLS: 1
DEPRESSION: 0
CARDIOVASCULAR NEGATIVE: 1
MYALGIAS: 0
EYES NEGATIVE: 1
COUGH: 1
MYALGIAS: 1
BRUISES/BLEEDS EASILY: 0
DIAPHORESIS: 1
SORE THROAT: 0
INSOMNIA: 0

## 2019-04-19 ASSESSMENT — PATIENT HEALTH QUESTIONNAIRE - PHQ9
2. FEELING DOWN, DEPRESSED, IRRITABLE, OR HOPELESS: NOT AT ALL
1. LITTLE INTEREST OR PLEASURE IN DOING THINGS: NOT AT ALL
SUM OF ALL RESPONSES TO PHQ9 QUESTIONS 1 AND 2: 0

## 2019-04-19 ASSESSMENT — PAIN DESCRIPTION - DESCRIPTORS: DESCRIPTORS: ACHING

## 2019-04-19 NOTE — FLOWSHEET NOTE
04/19/19 0937   Events/Summary/Plan   Events/Summary/Plan Titrated patient to 60%   Heated Hi Flow Nasal Cannula   Heated Hi Flow Nasal Cannula (HHFNC) Yes   FiO2 (HHFNC) 75  (titrated to 60%)   Flowrate (HHFNC) 50   Humidifier Temperature (HHFNC) 31   Chest Exam   Respiration (!) 25   Pulse 86   Heart Rate (Monitored) 86   Oximetry   #Pulse Oximetry (Single Determination) Yes   Oxygen   Pulse Oximetry 100 %   O2 (LPM) 50   FiO2% 75 %  (titrated to 60%)   O2 Daily Delivery Respiratory  Highflow Nasal Cannula

## 2019-04-19 NOTE — ASSESSMENT & PLAN NOTE
Required ICU admission, secondary to bilateral pneumonia, elevated pro-calcitonin  -Has completed 5 days of ceftriaxone and azithromycin therapy  -Imaging suggests possible underlying ILD, status post bronchoscopy 4/23 with biopsy taken  -She is now on 3L nasal cannula.  -Continue prednisone 40 mg daily

## 2019-04-19 NOTE — PROGRESS NOTES
0700-Patient arrived from ED. Able to stand and pivot to ICU bed. Monitors applied. Patient denies any pain or distress at this time. Oriented to call light.     0715-O2 increased to 10L via oxymask per RT.

## 2019-04-19 NOTE — ASSESSMENT & PLAN NOTE
-Initial troponin level 0.14 with a subsequent 1 of 0.15.  She was ruled out for PE with negative CTA  -Echo on 4/20 was normal

## 2019-04-19 NOTE — FLOWSHEET NOTE
04/19/19 0724   Interdisciplinary Plan of Care-Goals (Indications)   Bronchodilator Indications PFT / Reduced Airflow   Interdisciplinary Plan of Care-Outcomes    Bronchodilator Outcome Patient at Stable Baseline   Education   Education Yes - Pt. / Family has been Instructed in use of Respiratory Equipment;Yes - Pt. / Family has been Instructed in use of Respiratory Medications and Adverse Reactions   RT Assessment of Delivered Medications   Evaluation of Medication Delivery Daily Yes-- Pt /Family has been Instructed in use of Respiratory Medications and Adverse Reactions   SVN Group   #SVN Performed Yes   Given By: Mouthpiece   MDI/DPI Group   #MDI/DPI Given MDI/DPI x 1   Respiratory WDL   Respiratory (WDL) X   Chest Exam   Work Of Breathing / Effort Shallow   Respiration 20   Pulse 91   Heart Rate (Monitored) 89   Breath Sounds   Pre/Post Intervention Pre Intervention Assessment   RUL Breath Sounds Diminished;Fine Crackles   RML Breath Sounds Diminished;Fine Crackles   RLL Breath Sounds Diminished   BECCA Breath Sounds Clear   LLL Breath Sounds Fine Crackles;Diminished   Oxygen   Pulse Oximetry 93 %   O2 (LPM) 10   O2 Daily Delivery Respiratory  OxyMask

## 2019-04-19 NOTE — ASSESSMENT & PLAN NOTE
-Sepsis criteria: Tachypnea, tachycardia, leukocytosis.  Source is respiratory however at this time I am unable to fully rule out other causes of shock since she also has elevated troponin levels of 0.15 raising suspicion for possible underlying ACS.  Also, requested to read a stat CT scan of the chest to rule out pulmonary embolism and clarifying pulmonary imaging.  -We will admit to the intensive care unit.  -IV fluids: She received 2 L normal saline in the emergency department for a lactic acid of 2.3 and her lactate increased to 4.3.  I will add 1 more bolus of normal saline now and continue with 150 cc an hour.  -Antibiotics: Rocephin and azithromycin, first dose given in the emergency department  -Blood cultures ×2 done in the emergency department prior to administration of antibiotics  -Initial lactic: 2.3 and then increased to 4.3 therefore we will continue to do serial lactic acid.

## 2019-04-19 NOTE — FLOWSHEET NOTE
04/19/19 1137   Events/Summary/Plan   Events/Summary/Plan FiO2 titrated up to 65%.   Heated Hi Flow Nasal Cannula   FiO2 (HHFNC) 65   Flowrate (HHFNC) 50   Humidifier Temperature (HHFNC) 31   Chest Exam   Respiration (!) 26   Pulse 86   Heart Rate (Monitored) 85   Oxygen   Pulse Oximetry 90 %   O2 (LPM) 50   FiO2% 65 %   O2 Daily Delivery Respiratory  Highflow Nasal Cannula

## 2019-04-19 NOTE — PROGRESS NOTES
Patient resting in bed. Given tylenol and cough medicine for chest pain r/t cough. Call light in reach. Denies any other needs at this time.

## 2019-04-19 NOTE — FLOWSHEET NOTE
04/19/19 1416   Heated Hi Flow Nasal Cannula   Heated Hi Flow Nasal Cannula (HHFNC) Yes   FiO2 (HHFNC) 65   Flowrate (HHFNC) 50   Humidifier Temperature (HHFNC) 31   Chest Exam   Respiration (!) 29   Pulse 86   Heart Rate (Monitored) 83   Oximetry   #Pulse Oximetry (Single Determination) Yes   Oxygen   Pulse Oximetry 92 %   O2 (LPM) 50   FiO2% 65 %   O2 Daily Delivery Respiratory  Highflow Nasal Cannula

## 2019-04-19 NOTE — FLOWSHEET NOTE
04/19/19 1504   Interdisciplinary Plan of Care-Goals (Indications)   Bronchodilator Indications PFT / Reduced Airflow   Interdisciplinary Plan of Care-Outcomes    Bronchodilator Outcome Patient at Stable Baseline   RT Assessment of Delivered Medications   Evaluation of Medication Delivery Daily Yes-- Pt /Family has been Instructed in use of Respiratory Medications and Adverse Reactions   SVN Group   #SVN Performed Yes   Given By: Mouthpiece   Incentive Spirometry Group   Incentive Spirometry Instruction Yes   Breathing Exercises Yes   Incentive Spirometer Volume 1500 mL   Heated Hi Flow Nasal Cannula   Heated Hi Flow Nasal Cannula (HHFNC) Yes   FiO2 (HHFNC) 65   Flowrate (HHFNC) 50   Humidifier Temperature (HHFNC) 31   Respiratory WDL   Respiratory (WDL) X   Chest Exam   Respiration (!) 24   Pulse 83   Heart Rate (Monitored) 82   Breath Sounds   Pre/Post Intervention Pre Intervention Assessment   RUL Breath Sounds Diminished;Fine Crackles   RML Breath Sounds Diminished;Fine Crackles   RLL Breath Sounds Diminished   BECCA Breath Sounds Fine Crackles   LLL Breath Sounds Fine Crackles   Oximetry   #Pulse Oximetry (Single Determination) Yes   Oxygen   Pulse Oximetry 93 %   O2 (LPM) 50   FiO2% 65 %   O2 Daily Delivery Respiratory  Highflow Nasal Cannula

## 2019-04-19 NOTE — ED NOTES
"Chief Complaint   Patient presents with   • Shortness of Breath   • Chest Pain       /68   Pulse 100   Temp 36.7 °C (98.1 °F) (Temporal)   Resp (!) 24   Ht 1.727 m (5' 8\")   Wt 86.2 kg (190 lb)   SpO2 93%   BMI 28.89 kg/m²       "

## 2019-04-19 NOTE — H&P
Hospital Medicine History & Physical Note    Date of Service  4/19/2019    Primary Care Physician  Pcp Pt States None    Consultants  None    Code Status  Full Code    Chief Complaint  SOB    History of Presenting Illness  58 y.o. Female, with history of hypertension, hypothyroidism and asthma (well controlled and never intubated ), who presented to the ER on 4/19/2019 with 2-day history of progressively worse shortness of breath, productive cough, fever and chills.  Patient states she initially had headaches then subsequently sinus pain that developed to this shortness of breath/cough.    Patient was severely hypoxic and cyanotic upon arrival to the emergency department, O2 saturations was around 70% requiring 6 L of oxygen to maintain O2 saturations above 90%.  Initial laboratory showed significant leukocytosis at 29.2.  Her lactic was also elevated 2.3 and became even more elevated in spite of 2 L of IV fluid bolus given in the ED, now at 4.3.  She also had a slightly elevated troponin level of 0.14 and subsequent/repeat troponin was 0.15.    Review of Systems  Review of Systems   Constitutional: Positive for chills, diaphoresis and fever.   HENT: Negative for congestion and sore throat.    Eyes: Negative for blurred vision and double vision.   Respiratory: Positive for cough and shortness of breath.    Cardiovascular: Negative for chest pain and palpitations.   Gastrointestinal: Negative for nausea and vomiting.   Genitourinary: Negative for dysuria and urgency.   Musculoskeletal: Negative for myalgias and neck pain.   Skin: Negative for itching and rash.   Neurological: Positive for weakness. Negative for dizziness and headaches.   Endo/Heme/Allergies: Does not bruise/bleed easily.   Psychiatric/Behavioral: Negative for depression. The patient does not have insomnia.        Past Medical History   has a past medical history of Hypertension.    Surgical History   has a past surgical history that includes pr c-sec  only,prev c-sec.     Family History  family history includes Cancer in her maternal grandmother, mother, and sister.     Social History   reports that she quit smoking about 25 years ago. Her smoking use included Cigarettes. She has never used smokeless tobacco. She reports that she drinks alcohol. She reports that she does not use drugs.    Allergies  Allergies   Allergen Reactions   • Codeine    • Sulfa Drugs        Medications  Prior to Admission Medications   Prescriptions Last Dose Informant Patient Reported? Taking?   Omega-3 Fatty Acids (FISH OIL) 1000 MG Cap capsule 4/11/2019  Yes Yes   Sig: Take 1,000 mg by mouth every day.   albuterol 108 (90 Base) MCG/ACT Aero Soln inhalation aerosol 4/19/2019 at 0030  Yes Yes   Sig: Inhale 2 Puffs by mouth every 6 hours as needed for Shortness of Breath.   cyanocobalamin (VITAMIN B-12) 500 MCG Tab 4/11/2019  Yes Yes   Sig: Take 500 mcg by mouth every day.   hydrocodone-acetaminophen (NORCO) 5-325 MG TABS per tablet 4/18/2019 at 1400  No No   Sig: Take 1-2 Tabs by mouth every four hours as needed.   levothyroxine (SYNTHROID) 75 MCG Tab 4/18/2019 at 0530  Yes No   Sig: Take 88 mcg by mouth Every morning on an empty stomach.   lisinopril (PRINIVIL) 20 MG TABS 4/18/2019 at 0615  Yes No   Sig: Take 20 mg by mouth every day.   magnesium gluconate (MAG-G) 500 MG tablet 4/11/2019  Yes Yes   Sig: Take 500 mg by mouth 3 times a day.   therapeutic multivitamin-minerals (THERAGRAN-M) Tab 4/11/2019  Yes Yes   Sig: Take 1 Tab by mouth every day.   vitamin D (CHOLECALCIFEROL) 1000 UNIT Tab 4/11/2019  Yes Yes   Sig: Take 500 Units by mouth every day.      Facility-Administered Medications: None       Physical Exam  Temp:  [36.7 °C (98.1 °F)] 36.7 °C (98.1 °F)  Pulse:  [] 92  Resp:  [22-28] 28  BP: (131)/(68) 131/68  SpO2:  [77 %-95 %] 91 %    Physical Exam   Constitutional: She is oriented to person, place, and time. She appears well-developed and well-nourished. She appears  distressed (Moderate Respiratory distress).   Cyanotic on arrival     HENT:   Head: Normocephalic and atraumatic.   Eyes: Pupils are equal, round, and reactive to light. EOM are normal.   Neck: Normal range of motion. Neck supple.   Cardiovascular: Regular rhythm.  Tachycardia present.    Pulmonary/Chest: Breath sounds normal. Accessory muscle usage present. Tachypnea noted. She is in respiratory distress. She has no wheezes. She has no rhonchi. She has no rales.   Abdominal: Soft. Bowel sounds are normal.   Musculoskeletal: Normal range of motion. She exhibits no edema.   Neurological: She is alert and oriented to person, place, and time.   Skin: Skin is warm and dry.   Psychiatric: She has a normal mood and affect. Her behavior is normal.       Laboratory:  Recent Labs      04/19/19 0314   WBC  29.2*   RBC  5.59*   HEMOGLOBIN  16.5*   HEMATOCRIT  49.6*   MCV  88.7   MCH  29.5   MCHC  33.3*   RDW  44.3   PLATELETCT  261   MPV  9.3     Recent Labs      04/19/19   0314   SODIUM  136   POTASSIUM  3.5*   CHLORIDE  102   CO2  22   GLUCOSE  190*   BUN  19   CREATININE  0.90   CALCIUM  8.8     Recent Labs      04/19/19   0314   ALTSGPT  23   ASTSGOT  46*   ALKPHOSPHAT  47   TBILIRUBIN  0.8   GLUCOSE  190*                 Recent Labs      04/19/19   0314  04/19/19   0416   TROPONINI  0.14*  0.15*       Urinalysis:    No results found     Imaging:  DX-CHEST-PORTABLE (1 VIEW)   Final Result         1.  Pulmonary edema and/or infiltrates are identified, which are stable since the prior exam.      CT-CTA CHEST PULMONARY ARTERY W/ RECONS    (Results Pending)   CT-CTA CHEST PULMONARY ARTERY W/ RECONS    (Results Pending)     EKG: Sinus tachycardia @ 106 bpm, L axis deviation with no acute ST elevation    Assessment/Plan:  I anticipate this patient will require at least two midnights for appropriate medical management, necessitating inpatient admission.    * Septic shock (HCC)   Assessment & Plan    -Sepsis criteria: Tachypnea,  tachycardia, leukocytosis.  Source is respiratory however at this time I am unable to fully rule out other causes of shock since she also has elevated troponin levels of 0.15 raising suspicion for possible underlying ACS.  Also, requested to read a stat CT scan of the chest to rule out pulmonary embolism and clarifying pulmonary imaging.  -We will admit to the intensive care unit.  -IV fluids: She received 2 L normal saline in the emergency department for a lactic acid of 2.3 and her lactate increased to 4.3.  I will add 1 more bolus of normal saline now and continue with 150 cc an hour.  -Antibiotics: Rocephin and azithromycin, first dose given in the emergency department  -Blood cultures ×2 done in the emergency department prior to administration of antibiotics  -Initial lactic: 2.3 and then increased to 4.3 therefore we will continue to do serial lactic acid.       Demand ischemia (HCC)   Assessment & Plan    -Initial troponin level 0.14 with a subsequent 1 of 0.15.  -ACS is not fully excluded as an etiology, however more on likely this is secondary to septic shock.  -Patient will have serial troponin levels and there is a pending CT angiogram of the chest for further clarification pending.     Hypoxia   Assessment & Plan    -As above.     Lactic acid acidosis   Assessment & Plan    -As above     Acute respiratory distress   Assessment & Plan    -Oxygenation was around 70% upon arrival to the emergency department, per ER reports she was cyanotic.  -She is now on 6 L nasal cannula.  -Patient will be admitted to the intensive care unit for acute management of respiratory distress and septic shock.     Atypical pneumonia   Assessment & Plan    -Chest x-ray showing pulmonary edema and/or infiltrates are identified which are stable since prior exam.  Due to acuity and severity of her symptoms, even though most likely this presentation is consistent with infectious etiology.  I am also considering other differentials  as explained above.         Treatment:  -high flow O2 @ 6 liters  -IV abx for the sepsis/PNA possible ARDS  -RT protocol for respiratory support    Risk:  pt is at risk for deterioration of respiratory status leading to intubation and ventilation support  -The patient is critically ill.  -Patient continues to have: Septic shock with moderate respiratory distress and lactic acidosis of 4.3 however at this time not requiring pressors.  -The vital organ system that is affected is the: Respiratory/cardiac  -If untreated there is a high chance of deterioration into: Further multisystemic organ failure  And eventually death.  -Critical care services that I am providing today is: Close monitoring and management of septic shock as well further diagnoses on other differential chest disease.  -The critical that has been undertaken is medically complex.  -There has been no overlapping critical care time.  -Critical care time not including procedures: 50 minutes      VTE prophylaxis: SCD's

## 2019-04-19 NOTE — FLOWSHEET NOTE
04/19/19 0858   Events/Summary/Plan   Events/Summary/Plan Placed patient on HFNC due to marginal saturations on 10 lpm Oxymask.   Heated Hi Flow Nasal Cannula   Heated Hi Flow Nasal Cannula (HHFNC) Yes   FiO2 (HHFNC) 75   Flowrate (HHFNC) 50   Humidifier Temperature (HHFNC) 31   Date of Last Circuit Change 04/19/19   Circuit Change Next Due Date (Q 7 Days) 04/26/19   Chest Exam   Respiration (!) 24   Pulse 90   Heart Rate (Monitored) 89   Oximetry   #Pulse Oximetry (Single Determination) Yes   Continuous Oximetry Yes   Oxygen   Pulse Oximetry 99 %   O2 (LPM) 50   FiO2% 75 %   O2 Daily Delivery Respiratory  Highflow Nasal Cannula

## 2019-04-19 NOTE — PROGRESS NOTES
Lab called with critical result of lactic acid of 4.6 at 0940. Critical lab result read back to lab.   Dr. Richard notified of critical lab result at 0940.  Critical lab result read back by Dr. Richard.

## 2019-04-19 NOTE — ED PROVIDER NOTES
"ED Provider Note    Means of arrival: private vehicle  History obtained from: patient  History limited by: none    CHIEF COMPLAINT  Chief Complaint   Patient presents with   • Shortness of Breath   • Chest Pain       HPI  Lillian Addison is a 58 y.o. female with past medical history significant for asthma, hypertension, and hypothyroidism who presents to the Emergency Department for shortness of breath and cough.  Patient reports for the last week she has been feeling rundown and as if she had cold-like symptoms with congestion and a mild cough.  Over the last 24 hours her cough has acutely worsened and this evening she became very short of breath, feeling \"like I cannot catch my breath\" therefore she came in for further evaluation.  Patient does have a history of asthma for which she is on a daily steroid inhaler and uses albuterol as needed.  Over the last day she has had increased use of her albuterol inhaler but she reports that this is not providing relief of her shortness of breath.  Patient states that when she coughs she has chest pain in her substernal region that is moderate in severity.  Patient is uncertain if she has had fevers.  She denies abdominal pain, nausea, vomiting.  She has no known cardiac history.    REVIEW OF SYSTEMS  Review of Systems   Constitutional: Positive for chills.   Respiratory: Positive for cough and shortness of breath.    Cardiovascular: Positive for chest pain.   Gastrointestinal: Negative for abdominal pain, nausea and vomiting.   Musculoskeletal: Positive for myalgias.   All other systems reviewed and are negative.        PAST MEDICAL HISTORY   has a past medical history of Hypertension.    SURGICAL HISTORY   has a past surgical history that includes c-sec only,prev c-sec.    SOCIAL HISTORY  Social History   Substance Use Topics   • Smoking status: Former Smoker     Types: Cigarettes     Quit date: 1/13/1994   • Smokeless tobacco: Never Used   • Alcohol use Yes    " "  History   Drug Use No       FAMILY HISTORY  Family History   Problem Relation Age of Onset   • Cancer Mother         breast   • Cancer Maternal Grandmother         breast   • Cancer Sister         breast       CURRENT MEDICATIONS  Home Medications    **Home medications have not yet been reviewed for this encounter**         ALLERGIES  Allergies   Allergen Reactions   • Codeine    • Sulfa Drugs        PHYSICAL EXAM  VITAL SIGNS: /68   Pulse 103   Temp 36.7 °C (98.1 °F) (Temporal)   Resp (!) 26   Ht 1.727 m (5' 8\")   Wt 86.2 kg (190 lb)   SpO2 77% Room Air   BMI 28.89 kg/m²   Vitals reviewed by myself.  Physical Exam   Constitutional: She is oriented to person, place, and time.   Patient appears short of breath   HENT:   Head: Normocephalic and atraumatic.   Patient's lips are slightly cyanotic   Eyes: Pupils are equal, round, and reactive to light. EOM are normal.   Neck: Normal range of motion.   Cardiovascular: Regular rhythm and normal heart sounds.    Tachycardic rate   Pulmonary/Chest:   Patient is tachypneic with increased work of breathing.  She has diminished breath sounds in all lung fields, no obvious wheezing is noted.   Musculoskeletal: Normal range of motion.   Neurological: She is alert and oriented to person, place, and time.   Skin: Skin is warm and dry.   Nursing note and vitals reviewed.        DIAGNOSTIC STUDIES /  LABS  Labs Reviewed   CBC WITH DIFFERENTIAL - Abnormal; Notable for the following:        Result Value    WBC 29.2 (*)     RBC 5.59 (*)     Hemoglobin 16.5 (*)     Hematocrit 49.6 (*)     MCHC 33.3 (*)     Neutrophils-Polys 94.70 (*)     Lymphocytes 1.70 (*)     Neutrophils (Absolute) 27.69 (*)     Lymphs (Absolute) 0.49 (*)     Baso (Absolute) 0.16 (*)     Immature Granulocytes (abs) 0.21 (*)     All other components within normal limits   COMP METABOLIC PANEL - Abnormal; Notable for the following:     Potassium 3.5 (*)     Anion Gap 12.0 (*)     Glucose 190 (*)     " "AST(SGOT) 46 (*)     Globulin 4.9 (*)     All other components within normal limits   TROPONIN - Abnormal; Notable for the following:     Troponin I 0.14 (*)     All other components within normal limits   ARTERIAL BLOOD GAS - Abnormal; Notable for the following:     Po2 48.1 (*)     O2 Saturation 82.7 (*)     All other components within normal limits   LACTIC ACID - Abnormal; Notable for the following:     Lactic Acid 2.3 (*)     All other components within normal limits   TROPONIN - Abnormal; Notable for the following:     Troponin I 0.15 (*)     All other components within normal limits   LACTIC ACID - Abnormal; Notable for the following:     Lactic Acid 4.3 (*)     All other components within normal limits   BLOOD CULTURE    Narrative:     Per Hospital Policy: Only change Specimen Src: to \"Line\" if  specified by physician order.   BLOOD CULTURE    Narrative:     Per Hospital Policy: Only change Specimen Src: to \"Line\" if  specified by physician order.   INFLUENZA A/B BY PCR   ESTIMATED GFR       All labs reviewed by me.    EKG Interpretation:  Interpreted by myself    12 Lead EKG interpreted by me to show:  EKG at 0307: Sinus tachycardia, heart rate 106, left axis deviation, normal intervals, , QRS 92, QTc 463, no acute ST-T segment changes, no evidence of acute arrhythmia or ischemia  My impression of this EKG: Does not indicate ischemia or arrhythmia at this time.    RADIOLOGY  DX-CHEST-PORTABLE (1 VIEW)   Final Result         1.  Pulmonary edema and/or infiltrates are identified, which are stable since the prior exam.        The radiologist's interpretation of all radiological studies have been reviewed by me.    REASSESSMENT  HYDRATION: Based on the patient's presentation of Sepsis and Tachycardia the patient was given IV fluids. IV Hydration was used because oral hydration was not adequate alone. Upon recheck following hydration, the patient was feeling improved and tachcyardia resolved.        COURSE " & MEDICAL DECISION MAKING  Nursing notes, VS, PMSFHx reviewed in chart.    Patient is a 58-year-old female who comes in for cough and shortness of breath.  Differential diagnosis includes viral syndrome, pneumonia, upper respiratory infection, asthma exacerbation, acute coronary syndrome.  Diagnostic work-up includes labs, EKG, chest x-ray.    On initial assessment patient is tachycardic, tachypneic, and hypoxic.  Her room air saturation is in the 70s, she is placed on facemask after which her oxygenation improved to the 90s.  Patient is empirically treated for possible asthma exacerbation with DuoNeb, Solu-Medrol, and magnesium.  After treatment patient's breathing is improved and we are able to titrate her down to nasal cannula.  An arterial blood gases drawn, however I believe the specimen is likely venous as the PO2 is very low.  However there is no acidosis and no retention of CO2 therefore asthma exacerbation is the unlikely cause of her shortness of breath, this is more likely infectious.  Labs returned and are notable for leukocytosis of 29.2 and lactic acid of 2.3, therefore I initiated sepsis protocol and started patient on IV fluids, ceftriaxone/azithromycin for presumed pneumonia, and blood cultures were obtained.  Patient's EKG demonstrated no evidence of acute arrhythmia or ischemia.  Labs returned are notable for troponin of 0.14, again his EKG is nonischemic I believe that this is likely mildly elevated secondary to infection.  A repeat troponin remains relatively unchanged at 0.15.  Of note repeat lactic acid after initial resuscitation phase returns and is actually more elevated at 4.3.  This could be related to albuterol treatment, however we will continue to treat patient for infection.  Influenza swab is negative.  Upon reassessment patient's tachycardia is trending down and she is feeling greatly improved.  Chest x-ray returns demonstrates no discrete pneumonia, however patient does have mild  pulmonary infiltrates.  Patient's exam is consistent with pneumonia likely causing sepsis.  I will admit her for further management of this and her hypoxemia.  Patient is agreeable to this plan.  I discussed the case with Dr. Patel who has accepted the admission.  At time of admission patient is in guarded condition.      FINAL IMPRESSION  1. Pneumonia due to infectious organism, unspecified laterality, unspecified part of lung    2. Dyspnea, unspecified type    3. Hypoxia    4. Elevated troponin    5. Cough

## 2019-04-19 NOTE — FLOWSHEET NOTE
04/19/19 1043   Interdisciplinary Plan of Care-Goals (Indications)   Bronchodilator Indications PFT / Reduced Airflow;History / Diagnosis   Interdisciplinary Plan of Care-Outcomes    Bronchodilator Outcome Patient at Stable Baseline   RT Assessment of Delivered Medications   Evaluation of Medication Delivery Daily Yes-- Pt /Family has been Instructed in use of Respiratory Medications and Adverse Reactions   SVN Group   #SVN Performed Yes   Given By: Mouthpiece   Incentive Spirometry Group   Incentive Spirometry Instruction Yes   Breathing Exercises Yes   Incentive Spirometer Volume 1750 mL   Incentive Spirometer Date Last Changed 04/19/19   Incentive Spirometer Next Change Date (Q 30 Days) 05/19/19   Heated Hi Flow Nasal Cannula   Heated Hi Flow Nasal Cannula (HHFNC) Yes   FiO2 (FNC) 60   Flowrate (FNC) 50   Humidifier Temperature (FNC) 31   Respiratory WDL   Respiratory (WDL) X   Chest Exam   Work Of Breathing / Effort Mild;Shallow   Respiration (!) 26   Pulse 87   Heart Rate (Monitored) 87   Breath Sounds   Pre/Post Intervention Pre Intervention Assessment   RUL Breath Sounds Diminished;Fine Crackles   RML Breath Sounds Diminished   RLL Breath Sounds Diminished   BECCA Breath Sounds Diminished;Fine Crackles   LLL Breath Sounds Diminished;Fine Crackles   Secretions   Cough Dry;Non Productive;Strong   How Sputum Obtained Spontaneous   Oximetry   #Pulse Oximetry (Single Determination) Yes   Oxygen   Pulse Oximetry 92 %   O2 (LPM) 50   FiO2% 60 %   O2 Daily Delivery Respiratory  Highflow Nasal Cannula

## 2019-04-19 NOTE — FLOWSHEET NOTE
04/19/19 0500   RT Assessment of Delivered Medications   Evaluation of Medication Delivery Daily Yes-- Pt /Family has been Instructed in use of Respiratory Medications and Adverse Reactions   SVN Group   #SVN Performed Yes   Given By: Mouthpiece   Respiratory WDL   Respiratory (WDL) X   Chest Exam   Respiration (!) 28   Pulse 92   Heart Rate (Monitored) 91   Breath Sounds   Pre/Post Intervention Pre Intervention Assessment   RUL Breath Sounds Clear   RML Breath Sounds Clear   RLL Breath Sounds Crackles;Diminished   BECCA Breath Sounds Clear   LLL Breath Sounds Crackles   Secretions   Cough Dry;Non Productive;Strong   Oximetry   #Pulse Oximetry (Single Determination) Yes   Oxygen   Pulse Oximetry 91 %   O2 (LPM) 6   O2 Daily Delivery Respiratory  Nasal Cannula

## 2019-04-20 ENCOUNTER — APPOINTMENT (OUTPATIENT)
Dept: RADIOLOGY | Facility: MEDICAL CENTER | Age: 59
DRG: 853 | End: 2019-04-20
Attending: INTERNAL MEDICINE
Payer: COMMERCIAL

## 2019-04-20 ENCOUNTER — APPOINTMENT (OUTPATIENT)
Dept: CARDIOLOGY | Facility: MEDICAL CENTER | Age: 59
DRG: 853 | End: 2019-04-20
Attending: INTERNAL MEDICINE
Payer: COMMERCIAL

## 2019-04-20 LAB
ACTION RANGE TRIGGERED IACRT: NO
ANION GAP SERPL CALC-SCNC: 8 MMOL/L (ref 0–11.9)
BASE EXCESS BLDA CALC-SCNC: -3 MMOL/L (ref -4–3)
BASOPHILS # BLD AUTO: 0.3 % (ref 0–1.8)
BASOPHILS # BLD: 0.09 K/UL (ref 0–0.12)
BNP SERPL-MCNC: 280 PG/ML (ref 0–100)
BODY TEMPERATURE: ABNORMAL DEGREES
BUN SERPL-MCNC: 10 MG/DL (ref 8–22)
CALCIUM SERPL-MCNC: 8.3 MG/DL (ref 8.4–10.2)
CHLORIDE SERPL-SCNC: 108 MMOL/L (ref 96–112)
CO2 BLDA-SCNC: 24 MMOL/L (ref 20–33)
CO2 SERPL-SCNC: 21 MMOL/L (ref 20–33)
CREAT SERPL-MCNC: 0.66 MG/DL (ref 0.5–1.4)
EOSINOPHIL # BLD AUTO: 0 K/UL (ref 0–0.51)
EOSINOPHIL NFR BLD: 0 % (ref 0–6.9)
ERYTHROCYTE [DISTWIDTH] IN BLOOD BY AUTOMATED COUNT: 46.9 FL (ref 35.9–50)
GLUCOSE SERPL-MCNC: 156 MG/DL (ref 65–99)
HCO3 BLDA-SCNC: 22.5 MMOL/L (ref 17–25)
HCT VFR BLD AUTO: 41.5 % (ref 37–47)
HGB BLD-MCNC: 13.5 G/DL (ref 12–16)
HOROWITZ INDEX BLDA+IHG-RTO: 89 MM[HG]
IMM GRANULOCYTES # BLD AUTO: 0.39 K/UL (ref 0–0.11)
IMM GRANULOCYTES NFR BLD AUTO: 1.3 % (ref 0–0.9)
INST. QUALIFIED PATIENT IIQPT: YES
LACTATE BLD-SCNC: 1.3 MMOL/L (ref 0.5–2)
LV EJECT FRACT  99904: 65
LV EJECT FRACT MOD 2C 99903: 68.18
LV EJECT FRACT MOD 4C 99902: 66.09
LV EJECT FRACT MOD BP 99901: 68.42
LYMPHOCYTES # BLD AUTO: 0.3 K/UL (ref 1–4.8)
LYMPHOCYTES NFR BLD: 1 % (ref 22–41)
MCH RBC QN AUTO: 29.5 PG (ref 27–33)
MCHC RBC AUTO-ENTMCNC: 32.5 G/DL (ref 33.6–35)
MCV RBC AUTO: 90.6 FL (ref 81.4–97.8)
MONOCYTES # BLD AUTO: 0.52 K/UL (ref 0–0.85)
MONOCYTES NFR BLD AUTO: 1.8 % (ref 0–13.4)
NEUTROPHILS # BLD AUTO: 27.86 K/UL (ref 2–7.15)
NEUTROPHILS NFR BLD: 95.6 % (ref 44–72)
NRBC # BLD AUTO: 0 K/UL
NRBC BLD-RTO: 0 /100 WBC
O2/TOTAL GAS SETTING VFR VENT: 73 %
PCO2 BLDA: 40.1 MMHG (ref 26–37)
PCO2 TEMP ADJ BLDA: 38.6 MMHG (ref 26–37)
PH BLDA: 7.36 [PH] (ref 7.4–7.5)
PH TEMP ADJ BLDA: 7.37 [PH] (ref 7.4–7.5)
PLATELET # BLD AUTO: 230 K/UL (ref 164–446)
PMV BLD AUTO: 9.7 FL (ref 9–12.9)
PO2 BLDA: 65 MMHG (ref 64–87)
PO2 TEMP ADJ BLDA: 62 MMHG (ref 64–87)
POTASSIUM SERPL-SCNC: 3.8 MMOL/L (ref 3.6–5.5)
PROCALCITONIN SERPL-MCNC: 4.14 NG/ML
RBC # BLD AUTO: 4.58 M/UL (ref 4.2–5.4)
SAO2 % BLDA: 92 % (ref 93–99)
SODIUM SERPL-SCNC: 137 MMOL/L (ref 135–145)
SPECIMEN DRAWN FROM PATIENT: ABNORMAL
TROPONIN I SERPL-MCNC: 0.05 NG/ML (ref 0–0.04)
WBC # BLD AUTO: 29.2 K/UL (ref 4.8–10.8)

## 2019-04-20 PROCEDURE — 84484 ASSAY OF TROPONIN QUANT: CPT

## 2019-04-20 PROCEDURE — 82803 BLOOD GASES ANY COMBINATION: CPT

## 2019-04-20 PROCEDURE — 83605 ASSAY OF LACTIC ACID: CPT

## 2019-04-20 PROCEDURE — 700102 HCHG RX REV CODE 250 W/ 637 OVERRIDE(OP): Performed by: HOSPITALIST

## 2019-04-20 PROCEDURE — 93306 TTE W/DOPPLER COMPLETE: CPT | Mod: 26 | Performed by: INTERNAL MEDICINE

## 2019-04-20 PROCEDURE — 83880 ASSAY OF NATRIURETIC PEPTIDE: CPT

## 2019-04-20 PROCEDURE — 99233 SBSQ HOSP IP/OBS HIGH 50: CPT | Performed by: INTERNAL MEDICINE

## 2019-04-20 PROCEDURE — 36600 WITHDRAWAL OF ARTERIAL BLOOD: CPT

## 2019-04-20 PROCEDURE — A9270 NON-COVERED ITEM OR SERVICE: HCPCS | Performed by: HOSPITALIST

## 2019-04-20 PROCEDURE — 80048 BASIC METABOLIC PNL TOTAL CA: CPT

## 2019-04-20 PROCEDURE — 770022 HCHG ROOM/CARE - ICU (200)

## 2019-04-20 PROCEDURE — 700105 HCHG RX REV CODE 258: Performed by: INTERNAL MEDICINE

## 2019-04-20 PROCEDURE — 86713 LEGIONELLA ANTIBODY: CPT

## 2019-04-20 PROCEDURE — 94667 MNPJ CHEST WALL 1ST: CPT

## 2019-04-20 PROCEDURE — 700101 HCHG RX REV CODE 250: Performed by: HOSPITALIST

## 2019-04-20 PROCEDURE — 85025 COMPLETE CBC W/AUTO DIFF WBC: CPT

## 2019-04-20 PROCEDURE — 71045 X-RAY EXAM CHEST 1 VIEW: CPT

## 2019-04-20 PROCEDURE — 94640 AIRWAY INHALATION TREATMENT: CPT

## 2019-04-20 PROCEDURE — A9270 NON-COVERED ITEM OR SERVICE: HCPCS | Performed by: INTERNAL MEDICINE

## 2019-04-20 PROCEDURE — 94668 MNPJ CHEST WALL SBSQ: CPT

## 2019-04-20 PROCEDURE — 700111 HCHG RX REV CODE 636 W/ 250 OVERRIDE (IP): Performed by: INTERNAL MEDICINE

## 2019-04-20 PROCEDURE — 93306 TTE W/DOPPLER COMPLETE: CPT

## 2019-04-20 PROCEDURE — 700102 HCHG RX REV CODE 250 W/ 637 OVERRIDE(OP): Performed by: INTERNAL MEDICINE

## 2019-04-20 RX ADMIN — METHYLPREDNISOLONE SODIUM SUCCINATE 40 MG: 40 INJECTION, POWDER, FOR SOLUTION INTRAMUSCULAR; INTRAVENOUS at 06:00

## 2019-04-20 RX ADMIN — IPRATROPIUM BROMIDE AND ALBUTEROL SULFATE 3 ML: .5; 3 SOLUTION RESPIRATORY (INHALATION) at 18:50

## 2019-04-20 RX ADMIN — BENZONATATE 100 MG: 100 CAPSULE ORAL at 09:28

## 2019-04-20 RX ADMIN — METHYLPREDNISOLONE SODIUM SUCCINATE 40 MG: 40 INJECTION, POWDER, FOR SOLUTION INTRAMUSCULAR; INTRAVENOUS at 13:27

## 2019-04-20 RX ADMIN — LEVOTHYROXINE SODIUM 88 MCG: 88 TABLET ORAL at 06:00

## 2019-04-20 RX ADMIN — METHYLPREDNISOLONE SODIUM SUCCINATE 40 MG: 40 INJECTION, POWDER, FOR SOLUTION INTRAMUSCULAR; INTRAVENOUS at 22:07

## 2019-04-20 RX ADMIN — BUDESONIDE AND FORMOTEROL FUMARATE DIHYDRATE 2 PUFF: 80; 4.5 AEROSOL RESPIRATORY (INHALATION) at 18:57

## 2019-04-20 RX ADMIN — CEFTRIAXONE SODIUM 2 G: 2 INJECTION, POWDER, FOR SOLUTION INTRAMUSCULAR; INTRAVENOUS at 05:59

## 2019-04-20 RX ADMIN — GUAIFENESIN AND DEXTROMETHORPHAN 10 ML: 100; 10 SYRUP ORAL at 02:32

## 2019-04-20 RX ADMIN — GUAIFENESIN AND DEXTROMETHORPHAN 10 ML: 100; 10 SYRUP ORAL at 22:07

## 2019-04-20 RX ADMIN — BENZONATATE 100 MG: 100 CAPSULE ORAL at 22:07

## 2019-04-20 RX ADMIN — AZITHROMYCIN 250 MG: 250 TABLET, FILM COATED ORAL at 06:00

## 2019-04-20 RX ADMIN — IPRATROPIUM BROMIDE AND ALBUTEROL SULFATE 3 ML: .5; 3 SOLUTION RESPIRATORY (INHALATION) at 11:35

## 2019-04-20 RX ADMIN — BUDESONIDE AND FORMOTEROL FUMARATE DIHYDRATE 2 PUFF: 80; 4.5 AEROSOL RESPIRATORY (INHALATION) at 07:37

## 2019-04-20 RX ADMIN — IPRATROPIUM BROMIDE AND ALBUTEROL SULFATE 3 ML: .5; 3 SOLUTION RESPIRATORY (INHALATION) at 07:28

## 2019-04-20 RX ADMIN — ENOXAPARIN SODIUM 40 MG: 100 INJECTION SUBCUTANEOUS at 05:59

## 2019-04-20 RX ADMIN — IPRATROPIUM BROMIDE AND ALBUTEROL SULFATE 3 ML: .5; 3 SOLUTION RESPIRATORY (INHALATION) at 14:36

## 2019-04-20 RX ADMIN — GUAIFENESIN AND DEXTROMETHORPHAN 10 ML: 100; 10 SYRUP ORAL at 13:29

## 2019-04-20 ASSESSMENT — ENCOUNTER SYMPTOMS
FEVER: 1
EYES NEGATIVE: 1
SPUTUM PRODUCTION: 0
PSYCHIATRIC NEGATIVE: 1
SHORTNESS OF BREATH: 1
CARDIOVASCULAR NEGATIVE: 1
COUGH: 1
WHEEZING: 0
MUSCULOSKELETAL NEGATIVE: 1
SINUS PAIN: 1
SORE THROAT: 1
NEUROLOGICAL NEGATIVE: 1
CHILLS: 1
HEMOPTYSIS: 0
GASTROINTESTINAL NEGATIVE: 1

## 2019-04-20 NOTE — PROGRESS NOTES
Dr. Richard telephoned for updates.  Respiratory condition discussed.  Patient has been in considerable distress r/t coughing despite PRN medications and has increased WOB.  spO2 88-91% on HFNC - 50L @ 60% FiO2.  IV fluids discontinued per MD.

## 2019-04-20 NOTE — PROGRESS NOTES
Critical Care Progress Note    Date of admission  4/19/2019    Chief Complaint  58 y.o. female admitted 4/19/2019 with sob, dry cough and fever and chills for two days   Hospital Course    admitted from ER this morning. Started on CAP coverage   Influenza titers were negative       Interval Problem Update  Reviewed last 24 hour events:  Cultures all negative so far   afeberile in the ER   Not in respiratory distress but requiring HFNC   CT chest showed extensive ground glass opacities in both upper lobes   Lactic acid trending down   BNP ~300  NS at 150       Review of Systems  Review of Systems   Constitutional: Positive for chills and fever.   HENT: Positive for sinus pain and sore throat.    Eyes: Negative.    Respiratory: Positive for cough and shortness of breath. Negative for hemoptysis, sputum production and wheezing.    Cardiovascular: Negative.    Gastrointestinal: Negative.    Genitourinary: Negative.    Musculoskeletal: Negative.    Skin: Negative.    Neurological: Negative.    Endo/Heme/Allergies: Negative.    Psychiatric/Behavioral: Negative.         Vital Signs for last 24 hours   Temp:  [36.2 °C (97.2 °F)-36.7 °C (98.1 °F)] 36.3 °C (97.4 °F)  Pulse:  [] 86  Resp:  [14-40] 14  BP: (131)/(68) 131/68  SpO2:  [77 %-100 %] 93 %    Hemodynamic parameters for last 24 hours       Respiratory Information for the last 24 hours       Physical Exam   Physical Exam   Constitutional: She is oriented to person, place, and time. She appears well-developed and well-nourished. No distress.   HENT:   Head: Normocephalic and atraumatic.   Right Ear: External ear normal.   Left Ear: External ear normal.   Nose: Nose normal.   Mouth/Throat: Oropharynx is clear and moist.   Eyes: Pupils are equal, round, and reactive to light. Conjunctivae and EOM are normal. Right eye exhibits discharge. Left eye exhibits no discharge. No scleral icterus.   Neck: Neck supple. No JVD present. No tracheal deviation present.    Cardiovascular: Regular rhythm.  Exam reveals no friction rub.    No murmur heard.  Pulmonary/Chest: No respiratory distress. She has no wheezes. She has no rales.   Abdominal: Soft. She exhibits no distension. There is no tenderness.   Neurological: She is alert and oriented to person, place, and time. No cranial nerve deficit. Coordination normal.   Skin: Skin is warm and dry. No rash noted. She is not diaphoretic. No erythema.       Medications  Current Facility-Administered Medications   Medication Dose Route Frequency Provider Last Rate Last Dose   • levothyroxine (SYNTHROID) tablet 88 mcg  88 mcg Oral AM ES Rosalina Amaya M.D.   88 mcg at 04/19/19 0740   • senna-docusate (PERICOLACE or SENOKOT S) 8.6-50 MG per tablet 2 Tab  2 Tab Oral BID Rosalina Amaya M.D.        And   • polyethylene glycol/lytes (MIRALAX) PACKET 1 Packet  1 Packet Oral QDAY PRN Rosalina Amaya M.D.        And   • magnesium hydroxide (MILK OF MAGNESIA) suspension 30 mL  30 mL Oral QDAY PRN Rosalina Amaya M.D.        And   • bisacodyl (DULCOLAX) suppository 10 mg  10 mg Rectal QDAY PRN Rosalina Amaya M.D.       • Respiratory Care per Protocol   Nebulization Continuous RT Rosalina Amaya M.D.       • NS infusion   Intravenous Continuous Rosalina Amaya M.D. 150 mL/hr at 04/19/19 1446     • acetaminophen (TYLENOL) tablet 650 mg  650 mg Oral Q6HRS PRN Rosalina Amaya M.D.   650 mg at 04/19/19 1209   • guaiFENesin dextromethorphan (ROBITUSSIN DM) 100-10 MG/5ML syrup 10 mL  10 mL Oral Q6HRS PRN Rosalina Amaya M.D.   10 mL at 04/19/19 1923   • ipratropium-albuterol (DUONEB) nebulizer solution  3 mL Nebulization 4X/DAY (RT) Rosalina Amaya M.D.   3 mL at 04/19/19 1937   • budesonide-formoterol (SYMBICORT) 80-4.5 MCG/ACT inhaler 2 Puff  2 Puff Inhalation BID (RT) Rsoalina Amaya M.D.   2 Puff at 04/19/19 1939   • albuterol (PROVENTIL) 2.5mg/0.5ml nebulizer  solution 2.5 mg  2.5 mg Nebulization RT EVERY 1 HOUR PRN Rosalina Amaya M.D.       • [START ON 4/20/2019] cefTRIAXone (ROCEPHIN) 2 g in  mL IVPB  2 g Intravenous Q24HRS Rosa Elena Richard M.D.       • [START ON 4/20/2019] azithromycin (ZITHROMAX) tablet 250 mg  250 mg Oral DAILY Rosa Elena Richard M.D.       • benzocaine-menthol (CEPACOL) lozenge 1 Lozenge  1 Lozenge Mouth/Throat Q2HRS PRN Rosa Elena Richard M.D.   1 Lozenge at 04/19/19 1841   • benzonatate (TESSALON) capsule 100 mg  100 mg Oral TID PRN Rosa Elena Richard M.D.           Fluids    Intake/Output Summary (Last 24 hours) at 04/19/19 2106  Last data filed at 04/19/19 1800   Gross per 24 hour   Intake           3992.5 ml   Output             1850 ml   Net           2142.5 ml       Laboratory  Recent Labs      04/19/19   0311   [id number]  7.44   [id number]  29.2   [id number]  48.1*   [id number]  82.7*   ARTHCO3  20   ARTBE  -3     Recent Labs      04/19/19   0314   04/19/19   0908  04/19/19   1302  04/19/19   1725   TROPONINI  0.14*   < >  0.12*  0.17*  0.14*   BNPBTYPENAT  272*   --    --    --    --     < > = values in this interval not displayed.     Recent Labs      04/19/19   0314 04/19/19   0908   SODIUM  136  135   POTASSIUM  3.5*  3.5*   CHLORIDE  102  106   CO2  22  18*   BUN  19  15   CREATININE  0.90  0.85   CALCIUM  8.8  7.8*     Recent Labs      04/19/19   0314  04/19/19   0908   ALTSGPT  23   --    ASTSGOT  46*   --    ALKPHOSPHAT  47   --    TBILIRUBIN  0.8   --    GLUCOSE  190*  239*     Recent Labs      04/19/19 0314   WBC  29.2*   NEUTSPOLYS  94.70*   LYMPHOCYTES  1.70*   MONOCYTES  2.40   EOSINOPHILS  0.00   BASOPHILS  0.50   ASTSGOT  46*   ALTSGPT  23   ALKPHOSPHAT  47   TBILIRUBIN  0.8     Recent Labs      04/19/19   0314   RBC  5.59*   HEMOGLOBIN  16.5*   HEMATOCRIT  49.6*   PLATELETCT  261       Imaging  CT:    Reviewed    Assessment/Plan  Bilateral pneumonia in both upper lobes   ceftriaxone and Zithromax   Check pro calcitonin    Check legionella titers   Cont HFNC, titrate to keep SpO2 around 92%  Pt is at high risk for deterioration and requiring mechanical ventilation   Follow up cultures       Severe sepsis   Secondary to above   -Sepsis criteria: Tachypnea, tachycardia, leukocytosis.    Received 2 liters NS in the ER   Now on NS at 150  Monitor for fluid overload   Lactic acid is trending down         Demand ischemia (HCC)  -Initial troponin level 0.14 with a subsequent 1 of 0.13      Acute respiratory distress  Secondary to pneumonia   -Oxygenation was around 70% upon arrival to the emergency department, per ER reports she was cyanotic.  -She is now on HFNC, doing well on that, not using accessory  muscles   High risk for needing intubation.      Hypoxia  -As above.    Lactic acid acidosis  -As above    History of asthma   Does not look to be on exacerbations   Cont duones         No new Assessment & Plan notes have been filed under this hospital service since the last note was generated.  Service: Pulmonary       VTE:  Lovenox  Ulcer: Not Indicated  Lines: None    I have performed a physical exam and reviewed and updated ROS and Plan today (4/19/2019). In review of yesterday's note (4/18/2019), there are no changes except as documented above.     Discussed patient condition and risk of morbidity and/or mortality with RN, RT, Pharmacy, Charge nurse / hot rounds and Patient  The patient remains critically ill.  Critical care time = 34 minutes in directly providing and coordinating critical care and extensive data review.  No time overlap and excludes procedures.

## 2019-04-20 NOTE — PROGRESS NOTES
Echo completed. Pt's  Ubaldo at bedside. POC discussed with  by RN and Dr. Richard. Per  states that pt had recent work-up at [place] a couple months ago and had a CT chest and echo done, records requested per Dr. Richard.

## 2019-04-20 NOTE — PROGRESS NOTES
Report to MISSAEL Connors. POC reviewed. Pt resting in bed. Does report some chest pain d/t cough but states it is tolerable at this time.

## 2019-04-20 NOTE — FLOWSHEET NOTE
This note also relates to the following rows which could not be included:  Respiration - Cannot attach notes to unvalidated device data  Pulse - Cannot attach notes to unvalidated device data  Heart Rate (Monitored) - Cannot attach notes to unvalidated device data  Pulse Oximetry - Cannot attach notes to unvalidated device data       04/19/19 1939   Interdisciplinary Plan of Care-Goals (Indications)   Bronchodilator Indications History / Diagnosis;PFT / Reduced Airflow   Interdisciplinary Plan of Care-Outcomes    Bronchodilator Outcome Improved Vital Signs and Measures of Gas Exchange   Education   Education Yes - Pt. / Family has been Instructed in use of Respiratory Equipment;Yes - Pt. / Family has been Instructed in use of Respiratory Medications and Adverse Reactions   RT Assessment of Delivered Medications   Evaluation of Medication Delivery Daily Yes-- Pt /Family has been Instructed in use of Respiratory Medications and Adverse Reactions   SVN Group   #SVN Performed Yes   Given By: Mouthpiece   MDI/DPI Group   #MDI/DPI Given MDI/DPI x 1   Heated Hi Flow Nasal Cannula   Heated Hi Flow Nasal Cannula (HHFNC) Yes   FiO2 (HHFNC) 63   Flowrate (HHFNC) 50   Humidifier Temperature (HHFNC) 31   Respiratory WDL   Respiratory (WDL) X   Chest Exam   Work Of Breathing / Effort Mild   Breath Sounds   Pre/Post Intervention Pre Intervention Assessment   RUL Breath Sounds Diminished   RML Breath Sounds Diminished   RLL Breath Sounds Diminished   BECCA Breath Sounds Diminished   LLL Breath Sounds Diminished   Oximetry   #Pulse Oximetry (Single Determination) Yes   Oxygen   O2 (LPM) 50   FiO2% 63 %   O2 Daily Delivery Respiratory  Highflow Nasal Cannula

## 2019-04-20 NOTE — PROGRESS NOTES
Updated Dr. Patel regarding respiratory condition - fine crackles throughout lung fields, increased WOB, and increasing O2 demand.  Also discussed previous conversation with Dr. Richard regarding whether to give Lasix with potential signs of septic shock (low diastolic BP) and recently elevated lactate.  Orders for 20mg Lasix IV entered into [facility] by MD.    0100 - Patient's SOB improved, but unable to titrate down oxygen.  Start of shift 60% FiO2 and end of shift 75% FiO2.    0600 - Total UOP for shift ~2.5L.

## 2019-04-20 NOTE — CARE PLAN
Problem: Oxygenation:  Goal: Maintain adequate oxygenation dependent on patient condition  Outcome: PROGRESSING AS EXPECTED  Monitor oxygenation for SpO2 >90%  Intervention: Manage oxygen therapy by monitoring pulse oximetry and/or ABG values  Oxygen is currently required at 65% with 50 lpm  High flow nasal cannula for SpO2 >90%  Intervention: Levels of oxygenation will improve to baseline  Patient did not require oxygen at home prior to this admission.       Problem: Bronchoconstriction:  Goal: Improve in air movement and diminished wheezing  Outcome: PROGRESSING AS EXPECTED  Patient does claim a benefit from bronchodilator therapy. There was a increase in aeration heard  Intervention: Implement inhaled treatments  Patient is receiving Duoneb Qid and Symbicort BID.  Intervention: Evaluate and manage medication effects  Patient will be evaluated before and after medication delivery to assess effectiveness of therapy. Currently there seems to be both subjective and objective improvement.      Problem: Hyperinflation:  Goal: Prevent or improve atelectasis  Outcome: PROGRESSING AS EXPECTED  Patient will be encouraged to use IS and PEP therapy.  Intervention: Instruct incentive spirometry usage  Patient will be followed QID with IS by respiratory care and encouraged to use Q1 w/a. Will do PEP therapy QID. to attempt a increase in IS volumes  Intervention: Perform hyperinflation therapy as indicated by assessment  Patient has increased her volumes with the IS to 1250 mls. PEP therapy to be continued QID.

## 2019-04-20 NOTE — FLOWSHEET NOTE
This note also relates to the following rows which could not be included:  Respiration - Cannot attach notes to unvalidated device data  Pulse - Cannot attach notes to unvalidated device data  Heart Rate (Monitored) - Cannot attach notes to unvalidated device data  Pulse Oximetry - Cannot attach notes to unvalidated device data       04/20/19 0357   Heated Hi Flow Nasal Cannula   Heated Hi Flow Nasal Cannula (HHFNC) Yes   FiO2 (HHFNC) 70   Flowrate (HHFNC) 50   Humidifier Temperature (FNC) 31   Oxygen   O2 (LPM) 50   FiO2% 70 %   O2 Daily Delivery Respiratory  Highflow Nasal Cannula

## 2019-04-20 NOTE — PROGRESS NOTES
Dr. Richard called for update.  Respiratory condition and chest pain r/t cough discussed.  Orders for AM CXR and ABG as well as PRN tessalon pearls obtained. Ordered to decrease IVMF from 150 to 75ml/hr.

## 2019-04-20 NOTE — FLOWSHEET NOTE
04/20/19 1135   Events/Summary/Plan   Non-Invasive Resp Device Site Inspection Completed Intact   Interdisciplinary Plan of Care-Goals (Indications)   Bronchodilator Indications Physical Exam / Hyperinflation / Wheezing (bronchospasm)   Interdisciplinary Plan of Care-Outcomes    Bronchodilator Outcome Improved Patient Appearance with Decreased use of Accessory Muscles   Education   Education Yes - Pt. / Family has been Instructed in use of Respiratory Medications and Adverse Reactions   RT Assessment of Delivered Medications   Evaluation of Medication Delivery Daily Yes-- Pt /Family has been Instructed in use of Respiratory Medications and Adverse Reactions   SVN Group   #SVN Performed Yes   Given By: Mouthpiece   Date SVN Next Change Due (Q 7 Days) 04/26/19   PEP/CPT Group   PEP/CPT/Airway Clearance Therapy Yes   #PEP/CPT (Manual) Subsequent Subsequent   PEP/CPT Method Positive Airway Pressure Device   Date Disposable Equipment Next Change Due (Q 30 Days) 05/20/19   Incentive Spirometry Group   Breathing Exercises Yes   Incentive Spirometer Volume 1500 mL   Incentive Spirometer Next Change Date (Q 30 Days) 05/19/19   Heated Hi Flow Nasal Cannula   Heated Hi Flow Nasal Cannula (HHFNC) Yes   FiO2 (HHFNC) 70   Flowrate (HHFNC) 50   Humidifier Temperature (HHFNC) 31   Circuit Change Next Due Date (Q 7 Days) 04/26/19   Chest Exam   Work Of Breathing / Effort Moderate   Respiration (!) 26   Pulse 72   Heart Rate (Monitored) 70   Breath Sounds   RUL Breath Sounds Fine Crackles   RML Breath Sounds Fine Crackles   RLL Breath Sounds Diminished   BECCA Breath Sounds Fine Crackles   LLL Breath Sounds Diminished   Secretions   Cough Non Productive;Strong   How Sputum Obtained Spontaneous   Oximetry   Continuous Oximetry Yes   Oxygen   Pulse Oximetry 93 %   O2 (LPM) 50   FiO2% 73 %   O2 Daily Delivery Respiratory  Highflow Nasal Cannula

## 2019-04-20 NOTE — CARE PLAN
Problem: Safety  Goal: Will remain free from falls    Intervention: Implement fall precautions  Treaded slipper socks in place, bed in low position, pt near nurses station, personal belongings at bedside, and call light in reach. Pt demonstrates correct use of call light and calling for assist as needed. Pt will remain free from falls.       Problem: Knowledge Deficit  Goal: Knowledge of disease process/condition, treatment plan, diagnostic tests, and medications will improve    Intervention: Assess knowledge level of disease process/condition, treatment plan, diagnostic tests, and medications  POC discussed with pt. IV antibiotics and RT treatments.

## 2019-04-20 NOTE — PROGRESS NOTES
Report received from night RN this AM, POC discussed. Lines and gtts verified. Assessment completed. Pt A&O x 4. RT started PEP therapy. No c/o pain. Pt up OOB with SBA. Eating breakfast now. Call light in reach. Pt demonstrates correct use of call light.

## 2019-04-20 NOTE — CARE PLAN
Problem: Respiratory:  Goal: Respiratory status will improve  Outcome: PROGRESSING AS EXPECTED  Deep breathe and cough encouraged as tolerated.  HFNC O2 titrated per MD order.  RT in collaboration for treatments.  20mg Lasix given per MD order x 1.

## 2019-04-20 NOTE — CARE PLAN
Problem: Safety  Goal: Will remain free from injury  Outcome: PROGRESSING AS EXPECTED  Appropriate protocols in place.

## 2019-04-20 NOTE — FLOWSHEET NOTE
This note also relates to the following rows which could not be included:  Respiration - Cannot attach notes to unvalidated device data  Pulse - Cannot attach notes to unvalidated device data  Heart Rate (Monitored) - Cannot attach notes to unvalidated device data  Pulse Oximetry - Cannot attach notes to unvalidated device data       04/19/19 2327   Heated Hi Flow Nasal Cannula   Heated Hi Flow Nasal Cannula (HHFNC) Yes   FiO2 (FNC) 77   Flowrate (FNC) 50   Humidifier Temperature (FNC) 31   Respiratory WDL   Respiratory (WDL) X   Chest Exam   Work Of Breathing / Effort Increased Work of Breathing   Breath Sounds   RUL Breath Sounds Fine Crackles   RML Breath Sounds Fine Crackles   RLL Breath Sounds Fine Crackles   BECCA Breath Sounds Clear   LLL Breath Sounds Fine Crackles   Oxygen   O2 (LPM) 50   FiO2% 77 %   O2 Daily Delivery Respiratory  Highflow Nasal Cannula

## 2019-04-20 NOTE — PROGRESS NOTES
Received bedside report from Kriss CANAS.  Patient is AO x 4, resting in bed, complaining of 8/10 coughing pain (2/10 at rest).  PRN cough syrup given.  Patient states that this is tolerable.  Will continue to assess pain/discomfort frequently.  Patient is on HFNC 50L @ 60% FiO2 with spO2 92%.  Lung sounds clear/diminished.  VS otherwise stable without intervention.  IVMF - NS at 150ml/hr.  POC reviewed, gtts verified, and safety protocols in place.  Will continue to trend lactate and troponin Q4hrs as ordered.

## 2019-04-21 LAB
ANION GAP SERPL CALC-SCNC: 10 MMOL/L (ref 0–11.9)
BASOPHILS # BLD AUTO: 0.1 % (ref 0–1.8)
BASOPHILS # BLD: 0.02 K/UL (ref 0–0.12)
BNP SERPL-MCNC: 360 PG/ML (ref 0–100)
BUN SERPL-MCNC: 16 MG/DL (ref 8–22)
CALCIUM SERPL-MCNC: 9.4 MG/DL (ref 8.5–10.5)
CHLORIDE SERPL-SCNC: 108 MMOL/L (ref 96–112)
CO2 SERPL-SCNC: 22 MMOL/L (ref 20–33)
CREAT SERPL-MCNC: 0.56 MG/DL (ref 0.5–1.4)
EOSINOPHIL # BLD AUTO: 0 K/UL (ref 0–0.51)
EOSINOPHIL NFR BLD: 0 % (ref 0–6.9)
ERYTHROCYTE [DISTWIDTH] IN BLOOD BY AUTOMATED COUNT: 46.9 FL (ref 35.9–50)
GLUCOSE SERPL-MCNC: 263 MG/DL (ref 65–99)
HCT VFR BLD AUTO: 39.5 % (ref 37–47)
HGB BLD-MCNC: 12.7 G/DL (ref 12–16)
IMM GRANULOCYTES # BLD AUTO: 0.35 K/UL (ref 0–0.11)
IMM GRANULOCYTES NFR BLD AUTO: 1.6 % (ref 0–0.9)
LYMPHOCYTES # BLD AUTO: 0.31 K/UL (ref 1–4.8)
LYMPHOCYTES NFR BLD: 1.4 % (ref 22–41)
MCH RBC QN AUTO: 29.5 PG (ref 27–33)
MCHC RBC AUTO-ENTMCNC: 32.2 G/DL (ref 33.6–35)
MCV RBC AUTO: 91.6 FL (ref 81.4–97.8)
MONOCYTES # BLD AUTO: 0.4 K/UL (ref 0–0.85)
MONOCYTES NFR BLD AUTO: 1.8 % (ref 0–13.4)
NEUTROPHILS # BLD AUTO: 21.42 K/UL (ref 2–7.15)
NEUTROPHILS NFR BLD: 95.1 % (ref 44–72)
NRBC # BLD AUTO: 0 K/UL
NRBC BLD-RTO: 0 /100 WBC
PLATELET # BLD AUTO: 245 K/UL (ref 164–446)
PMV BLD AUTO: 10.3 FL (ref 9–12.9)
POTASSIUM SERPL-SCNC: 4.6 MMOL/L (ref 3.6–5.5)
PROCALCITONIN SERPL-MCNC: 1.67 NG/ML
RBC # BLD AUTO: 4.31 M/UL (ref 4.2–5.4)
SODIUM SERPL-SCNC: 140 MMOL/L (ref 135–145)
WBC # BLD AUTO: 22.5 K/UL (ref 4.8–10.8)

## 2019-04-21 PROCEDURE — 80048 BASIC METABOLIC PNL TOTAL CA: CPT

## 2019-04-21 PROCEDURE — 700111 HCHG RX REV CODE 636 W/ 250 OVERRIDE (IP): Performed by: INTERNAL MEDICINE

## 2019-04-21 PROCEDURE — 94640 AIRWAY INHALATION TREATMENT: CPT

## 2019-04-21 PROCEDURE — 700102 HCHG RX REV CODE 250 W/ 637 OVERRIDE(OP): Performed by: INTERNAL MEDICINE

## 2019-04-21 PROCEDURE — A9270 NON-COVERED ITEM OR SERVICE: HCPCS | Performed by: HOSPITALIST

## 2019-04-21 PROCEDURE — 770022 HCHG ROOM/CARE - ICU (200)

## 2019-04-21 PROCEDURE — 85025 COMPLETE CBC W/AUTO DIFF WBC: CPT

## 2019-04-21 PROCEDURE — 700105 HCHG RX REV CODE 258: Performed by: INTERNAL MEDICINE

## 2019-04-21 PROCEDURE — 700101 HCHG RX REV CODE 250: Performed by: HOSPITALIST

## 2019-04-21 PROCEDURE — 94668 MNPJ CHEST WALL SBSQ: CPT

## 2019-04-21 PROCEDURE — 83880 ASSAY OF NATRIURETIC PEPTIDE: CPT

## 2019-04-21 PROCEDURE — 84145 PROCALCITONIN (PCT): CPT

## 2019-04-21 PROCEDURE — A9270 NON-COVERED ITEM OR SERVICE: HCPCS | Performed by: INTERNAL MEDICINE

## 2019-04-21 PROCEDURE — 94760 N-INVAS EAR/PLS OXIMETRY 1: CPT

## 2019-04-21 PROCEDURE — 700102 HCHG RX REV CODE 250 W/ 637 OVERRIDE(OP): Performed by: HOSPITALIST

## 2019-04-21 RX ORDER — FUROSEMIDE 10 MG/ML
20 INJECTION INTRAMUSCULAR; INTRAVENOUS ONCE
Status: COMPLETED | OUTPATIENT
Start: 2019-04-21 | End: 2019-04-21

## 2019-04-21 RX ADMIN — GUAIFENESIN AND DEXTROMETHORPHAN 10 ML: 100; 10 SYRUP ORAL at 21:07

## 2019-04-21 RX ADMIN — IPRATROPIUM BROMIDE AND ALBUTEROL SULFATE 3 ML: .5; 3 SOLUTION RESPIRATORY (INHALATION) at 06:25

## 2019-04-21 RX ADMIN — METHYLPREDNISOLONE SODIUM SUCCINATE 40 MG: 40 INJECTION, POWDER, FOR SOLUTION INTRAMUSCULAR; INTRAVENOUS at 06:26

## 2019-04-21 RX ADMIN — FUROSEMIDE 20 MG: 10 INJECTION, SOLUTION INTRAVENOUS at 16:49

## 2019-04-21 RX ADMIN — IPRATROPIUM BROMIDE AND ALBUTEROL SULFATE 3 ML: .5; 3 SOLUTION RESPIRATORY (INHALATION) at 19:33

## 2019-04-21 RX ADMIN — BUDESONIDE AND FORMOTEROL FUMARATE DIHYDRATE 2 PUFF: 80; 4.5 AEROSOL RESPIRATORY (INHALATION) at 06:25

## 2019-04-21 RX ADMIN — IPRATROPIUM BROMIDE AND ALBUTEROL SULFATE 3 ML: .5; 3 SOLUTION RESPIRATORY (INHALATION) at 11:07

## 2019-04-21 RX ADMIN — METHYLPREDNISOLONE SODIUM SUCCINATE 40 MG: 40 INJECTION, POWDER, FOR SOLUTION INTRAMUSCULAR; INTRAVENOUS at 14:09

## 2019-04-21 RX ADMIN — BUDESONIDE AND FORMOTEROL FUMARATE DIHYDRATE 2 PUFF: 80; 4.5 AEROSOL RESPIRATORY (INHALATION) at 19:34

## 2019-04-21 RX ADMIN — BENZONATATE 100 MG: 100 CAPSULE ORAL at 21:07

## 2019-04-21 RX ADMIN — GUAIFENESIN AND DEXTROMETHORPHAN 10 ML: 100; 10 SYRUP ORAL at 08:53

## 2019-04-21 RX ADMIN — AZITHROMYCIN 250 MG: 250 TABLET, FILM COATED ORAL at 06:26

## 2019-04-21 RX ADMIN — CEFTRIAXONE SODIUM 2 G: 2 INJECTION, POWDER, FOR SOLUTION INTRAMUSCULAR; INTRAVENOUS at 06:26

## 2019-04-21 RX ADMIN — METHYLPREDNISOLONE SODIUM SUCCINATE 40 MG: 40 INJECTION, POWDER, FOR SOLUTION INTRAMUSCULAR; INTRAVENOUS at 21:07

## 2019-04-21 RX ADMIN — LEVOTHYROXINE SODIUM 88 MCG: 88 TABLET ORAL at 06:26

## 2019-04-21 RX ADMIN — ENOXAPARIN SODIUM 40 MG: 100 INJECTION SUBCUTANEOUS at 06:27

## 2019-04-21 ASSESSMENT — ENCOUNTER SYMPTOMS
PSYCHIATRIC NEGATIVE: 1
CHILLS: 1
COUGH: 1
SORE THROAT: 1
HEMOPTYSIS: 0
MUSCULOSKELETAL NEGATIVE: 1
NEUROLOGICAL NEGATIVE: 1
GASTROINTESTINAL NEGATIVE: 1
WHEEZING: 0
SHORTNESS OF BREATH: 1
FEVER: 1
SINUS PAIN: 1
SPUTUM PRODUCTION: 0
EYES NEGATIVE: 1
CARDIOVASCULAR NEGATIVE: 1

## 2019-04-21 ASSESSMENT — PATIENT HEALTH QUESTIONNAIRE - PHQ9
SUM OF ALL RESPONSES TO PHQ9 QUESTIONS 1 AND 2: 0
2. FEELING DOWN, DEPRESSED, IRRITABLE, OR HOPELESS: NOT AT ALL
1. LITTLE INTEREST OR PLEASURE IN DOING THINGS: NOT AT ALL

## 2019-04-21 NOTE — PROGRESS NOTES
Report received from NOC RN, POC discussed, walking rounds completed, pt awake in bed, HFNC, all orders, medications, and lines verified, no s/s distress, call light within reach and will continue to monitor.

## 2019-04-21 NOTE — FLOWSHEET NOTE
04/21/19 0339   Heated Hi Flow Nasal Cannula   FiO2 (HHFNC) 68   Flowrate (HHFNC) 45   Humidifier Temperature (HHFNC) 31   Chest Exam   Respiration (!) 21   Pulse (!) 56   Heart Rate (Monitored) (!) 55   Oxygen   Pulse Oximetry 90 %   O2 (LPM) 45   FiO2% 68 %   O2 Daily Delivery Respiratory  Highflow Nasal Cannula

## 2019-04-21 NOTE — FLOWSHEET NOTE
This note also relates to the following rows which could not be included:  Respiration - Cannot attach notes to unvalidated device data  Pulse - Cannot attach notes to unvalidated device data  Heart Rate (Monitored) - Cannot attach notes to unvalidated device data  Pulse Oximetry - Cannot attach notes to unvalidated device data       04/20/19 2222   Heated Hi Flow Nasal Cannula   Heated Hi Flow Nasal Cannula (HHFNC) Yes   FiO2 (HHFNC) 60   Flowrate (HHFNC) 50   Humidifier Temperature (HHFNC) 31   Chest Exam   Work Of Breathing / Effort Mild   Oxygen   O2 (LPM) 50   FiO2% 60 %   O2 Daily Delivery Respiratory  Highflow Nasal Cannula

## 2019-04-21 NOTE — FLOWSHEET NOTE
This note also relates to the following rows which could not be included:  Respiration - Cannot attach notes to unvalidated device data  Pulse - Cannot attach notes to unvalidated device data  Heart Rate (Monitored) - Cannot attach notes to unvalidated device data  Pulse Oximetry - Cannot attach notes to unvalidated device data       04/20/19 1850   Interdisciplinary Plan of Care-Goals (Indications)   Bronchodilator Indications History / Diagnosis   Hyperinflation Protocol Indications Atelectasis Documented by Chest X-Ray   Interdisciplinary Plan of Care-Outcomes    Bronchodilator Outcome Improved Vital Signs and Measures of Gas Exchange   Hyperinflation Protocol Goals/Outcome Improvement in Repeat CXR   Education   Education Yes - Pt. / Family has been Instructed in use of Respiratory Equipment;Yes - Pt. / Family has been Instructed in use of Respiratory Medications and Adverse Reactions   RT Assessment of Delivered Medications   Evaluation of Medication Delivery Daily Yes-- Pt /Family has been Instructed in use of Respiratory Medications and Adverse Reactions   SVN Group   #SVN Performed Yes   Given By: Mouthpiece   MDI/DPI Group   #MDI/DPI Given MDI/DPI x 1   PEP/CPT Group   PEP/CPT/Airway Clearance Therapy Yes   #PEP/CPT (Manual) Subsequent Subsequent   PEP/CPT Method (thera pep)   Heated Hi Flow Nasal Cannula   Heated Hi Flow Nasal Cannula (HHFNC) Yes   FiO2 (HHFNC) 60   Flowrate (HHFNC) 50   Humidifier Temperature (HHFNC) 31   Respiratory WDL   Respiratory (WDL) X   Chest Exam   Work Of Breathing / Effort Moderate   Breath Sounds   Pre/Post Intervention Post Intervention Assessment   RUL Breath Sounds Fine Crackles   RML Breath Sounds Fine Crackles   RLL Breath Sounds Fine Crackles   BECCA Breath Sounds Fine Crackles   LLL Breath Sounds Fine Crackles   Secretions   Cough Non Productive;Strong   Oxygen   O2 (LPM) 50   FiO2% 60 %   O2 Daily Delivery Respiratory  Highflow Nasal Cannula

## 2019-04-21 NOTE — CARE PLAN
Problem: Respiratory:  Goal: Respiratory status will improve    Intervention: Collaborate with respiratory therapist and Interdisciplinary Team on treatment measures to improve respiratory function  HFNC working with RT, pt has fine crackles and dyspnea with movement.       Problem: Knowledge Deficit  Goal: Knowledge of disease process/condition, treatment plan, diagnostic tests, and medications will improve    Intervention: Assess knowledge level of disease process/condition, treatment plan, diagnostic tests, and medications  POC discussed, abx and monitor O2 levels.

## 2019-04-21 NOTE — CARE PLAN
Problem: Oxygenation:  Goal: Maintain adequate oxygenation dependent on patient condition  Outcome: PROGRESSING AS EXPECTED  Respiratory Therapy Update    Therapy changed to 8 LPM Oxymask

## 2019-04-21 NOTE — FLOWSHEET NOTE
04/21/19 1442   Education   Education Yes - Pt. / Family has been Instructed in use of Respiratory Equipment   Therapy Not Performed   Type of Therapy Not Performed SVN   PEP/CPT Group   PEP/CPT/Airway Clearance Therapy Yes   #PEP/CPT (Manual) Subsequent Subsequent   Incentive Spirometry Group   Incentive Spirometry Instruction Yes   Breathing Exercises Yes   Incentive Spirometer Volume 1500 mL   Respiratory WDL   Respiratory (WDL) X   Chest Exam   Respiration (!) 25   Pulse 78   Heart Rate (Monitored) 73   Breath Sounds   RUL Breath Sounds Fine Crackles   RML Breath Sounds Fine Crackles   RLL Breath Sounds Diminished   BECCA Breath Sounds Fine Crackles   LLL Breath Sounds Diminished   Secretions   Cough Moist;Non Productive   How Sputum Obtained Spontaneous   Oximetry   #Pulse Oximetry (Single Determination) Yes   Continuous Oximetry Yes   Oxygen   Pulse Oximetry 90 %   O2 (LPM) 8   O2 Daily Delivery Respiratory  OxyMask

## 2019-04-21 NOTE — PROGRESS NOTES
Received bedside report from Charla CANAS.  Patient is AO x 4 resting comfortably in bed without signs/symptoms of distress/discomfort.  Patient remains on HFNC 50L @ 60% FiO2 with spO2 89-92%.  Lung sounds less diminished than 24hrs ago.  Faint fine crackles auscultated in upper lobes (R>L).  POC reviewed, gtts verified, and safety protocols in place.  RAC 18G PIV checked for patency.

## 2019-04-21 NOTE — FLOWSHEET NOTE
04/21/19 1107   Interdisciplinary Plan of Care-Goals (Indications)   Bronchodilator Indications History / Diagnosis   Interdisciplinary Plan of Care-Outcomes    Bronchodilator Outcome Improved Vital Signs and Measures of Gas Exchange   Education   Education Yes - Pt. / Family has been Instructed in use of Respiratory Equipment;Yes - Pt. / Family has been Instructed in use of Respiratory Medications and Adverse Reactions   RT Assessment of Delivered Medications   Evaluation of Medication Delivery Daily Yes-- Pt /Family has been Instructed in use of Respiratory Medications and Adverse Reactions   SVN Group   #SVN Performed Yes   Given By: Mouthpiece   PEP/CPT Group   PEP/CPT/Airway Clearance Therapy Yes   #PEP/CPT (Manual) Subsequent Subsequent   Incentive Spirometry Group   Incentive Spirometry Instruction Yes   Breathing Exercises Yes   Incentive Spirometer Volume 1500 mL   Heated Hi Flow Nasal Cannula   FiO2 (HHFNC) 50   Flowrate (HHFNC) 45   Humidifier Temperature (HHFNC) 31   Respiratory WDL   Respiratory (WDL) X   Chest Exam   Respiration (!) 25   Pulse (!) 56   Heart Rate (Monitored) (!) 55   Breath Sounds   Pre/Post Intervention Pre Intervention Assessment   RUL Breath Sounds Clear;Fine Crackles   RML Breath Sounds Fine Crackles   RLL Breath Sounds Diminished   BECCA Breath Sounds Fine Crackles;Clear   LLL Breath Sounds Fine Crackles   Secretions   Cough Moist;Non Productive   How Sputum Obtained Spontaneous   Oximetry   #Pulse Oximetry (Single Determination) Yes   Continuous Oximetry Yes   Oxygen   Pulse Oximetry 96 %   O2 (LPM) 45   FiO2% 50 %   O2 Daily Delivery Respiratory  Highflow Nasal Cannula

## 2019-04-21 NOTE — CARE PLAN
Problem: Respiratory:  Goal: Respiratory status will improve  Outcome: PROGRESSING AS EXPECTED  Goal: Patient's respiratory condition will not decline - FiO2 demand will not increase. Currently 50L @ 60% FiO2.    Problem: Safety  Goal: Will remain free from injury  Outcome: PROGRESSING AS EXPECTED  Appropriate protocols in place.  Goal: Patient will not have an unexpected injury/or fall during shift.

## 2019-04-21 NOTE — FLOWSHEET NOTE
04/21/19 0626   Interdisciplinary Plan of Care-Goals (Indications)   Bronchodilator Indications History / Diagnosis   Interdisciplinary Plan of Care-Outcomes    Bronchodilator Outcome Improved Patient Appearance with Decreased use of Accessory Muscles   Education   Education Yes - Pt. / Family has been Instructed in use of Respiratory Medications and Adverse Reactions   RT Assessment of Delivered Medications   Evaluation of Medication Delivery Daily Yes-- Pt /Family has been Instructed in use of Respiratory Medications and Adverse Reactions   SVN Group   #SVN Performed Yes   Given By: Mouthpiece   Date SVN Last Changed 04/19/19   Date SVN Next Change Due (Q 7 Days) 04/26/19   MDI/DPI Group   #MDI/DPI Given MDI/DPI x 1   PEP/CPT Group   PEP/CPT/Airway Clearance Therapy Yes   #PEP/CPT (Manual) Subsequent Subsequent   Date Disposable Equipment Last Changed 04/20/19   Date Disposable Equipment Next Change Due (Q 30 Days) 04/27/19   Heated Hi Flow Nasal Cannula   Heated Hi Flow Nasal Cannula (HHFNC) Yes   FiO2 (HHFNC) 67   Flowrate (HHFNC) 45   Humidifier Temperature (HHFNC) 31   Date of Last Circuit Change 04/19/19   Circuit Change Next Due Date (Q 7 Days) 04/26/19   Respiratory WDL   Respiratory (WDL) X   Chest Exam   Work Of Breathing / Effort Mild;Moderate   Respiration (!) 23   Pulse 75   Heart Rate (Monitored) 75   Breath Sounds   Pre/Post Intervention Pre Intervention Assessment   RUL Breath Sounds Fine Crackles   RML Breath Sounds Fine Crackles   RLL Breath Sounds Diminished   BECCA Breath Sounds Fine Crackles   LLL Breath Sounds Diminished   Secretions   Cough Moist;Non Productive   How Sputum Obtained Spontaneous   Oximetry   #Pulse Oximetry (Single Determination) Yes   Continuous Oximetry Yes   Oxygen   Pulse Oximetry 94 %   O2 (LPM) 45   FiO2% 67 %   O2 Daily Delivery Respiratory  Highflow Nasal Cannula

## 2019-04-21 NOTE — PROGRESS NOTES
Critical Care Progress Note    Date of admission  4/19/2019    Chief Complaint  58 y.o. female admitted 4/19/2019 with sob, dry cough and fever and chills for two days   Hospital Course    admitted from ER this morning. Started on CAP coverage   Influenza titers were negative       Interval Problem Update  Reviewed last 24 hour events:  Cultures all negative so far   pro calcitonin 4.4  Wbc 29k     afeberile in the ER   Not in respiratory distress but requiring HFNC   CT chest showed extensive ground glass opacities in both upper lobes   Lactic acid normalized   BNP ~280 this morning   ivf discontinued   [id number]  Echo mostly unremarkable        Review of Systems  Review of Systems   Constitutional: Positive for chills and fever.   HENT: Positive for sinus pain and sore throat.    Eyes: Negative.    Respiratory: Positive for cough and shortness of breath. Negative for hemoptysis, sputum production and wheezing.    Cardiovascular: Negative.    Gastrointestinal: Negative.    Genitourinary: Negative.    Musculoskeletal: Negative.    Skin: Negative.    Neurological: Negative.    Endo/Heme/Allergies: Negative.    Psychiatric/Behavioral: Negative.         Vital Signs for last 24 hours   Temp:  [36.3 °C (97.4 °F)-36.9 °C (98.4 °F)] 36.3 °C (97.4 °F)  Pulse:  [63-95] 67  Resp:  [15-56] 26  SpO2:  [88 %-97 %] 94 %    Hemodynamic parameters for last 24 hours       Respiratory Information for the last 24 hours       Physical Exam   Physical Exam   Constitutional: She is oriented to person, place, and time. She appears well-developed and well-nourished. No distress.   HENT:   Head: Normocephalic and atraumatic.   Right Ear: External ear normal.   Left Ear: External ear normal.   Nose: Nose normal.   Mouth/Throat: Oropharynx is clear and moist.   Eyes: Pupils are equal, round, and reactive to light. Conjunctivae and EOM are normal. Right eye exhibits discharge. Left eye exhibits no discharge. No scleral icterus.   Neck: Neck  supple. No JVD present. No tracheal deviation present.   Cardiovascular: Regular rhythm.  Exam reveals no friction rub.    No murmur heard.  Pulmonary/Chest: No respiratory distress. She has no wheezes. She has no rales.   Abdominal: Soft. She exhibits no distension. There is no tenderness.   Neurological: She is alert and oriented to person, place, and time. No cranial nerve deficit. Coordination normal.   Skin: Skin is warm and dry. No rash noted. She is not diaphoretic. No erythema.       Medications  Current Facility-Administered Medications   Medication Dose Route Frequency Provider Last Rate Last Dose   • levothyroxine (SYNTHROID) tablet 88 mcg  88 mcg Oral AM ES Rosalina Amaya M.D.   88 mcg at 04/20/19 0600   • senna-docusate (PERICOLACE or SENOKOT S) 8.6-50 MG per tablet 2 Tab  2 Tab Oral BID Rosalina Amaya M.D.   Stopped at 04/20/19 0600    And   • polyethylene glycol/lytes (MIRALAX) PACKET 1 Packet  1 Packet Oral QDAY PRN Rosalina Amaya M.D.        And   • magnesium hydroxide (MILK OF MAGNESIA) suspension 30 mL  30 mL Oral QDAY PRN Rosalina Amaya M.D.        And   • bisacodyl (DULCOLAX) suppository 10 mg  10 mg Rectal QDAY PRN Rosalina Amaya M.D.       • Respiratory Care per Protocol   Nebulization Continuous RT Rosalina Amaya M.D.       • acetaminophen (TYLENOL) tablet 650 mg  650 mg Oral Q6HRS PRN Rosalina Amaya M.D.   650 mg at 04/19/19 1209   • guaiFENesin dextromethorphan (ROBITUSSIN DM) 100-10 MG/5ML syrup 10 mL  10 mL Oral Q6HRS PRN Rosalina Amaya M.D.   10 mL at 04/20/19 2207   • ipratropium-albuterol (DUONEB) nebulizer solution  3 mL Nebulization 4X/DAY (RT) Rosalina Amaya M.D.   3 mL at 04/20/19 1850   • budesonide-formoterol (SYMBICORT) 80-4.5 MCG/ACT inhaler 2 Puff  2 Puff Inhalation BID (RT) Rosalina Amaya M.D.   2 Puff at 04/20/19 1857   • albuterol (PROVENTIL) 2.5mg/0.5ml nebulizer solution 2.5 mg  2.5  mg Nebulization RT EVERY 1 HOUR PRN Rosalina Amaya M.D.       • cefTRIAXone (ROCEPHIN) 2 g in  mL IVPB  2 g Intravenous Q24HRS Rosa Elena Richard M.D.   Stopped at 04/20/19 0629   • azithromycin (ZITHROMAX) tablet 250 mg  250 mg Oral DAILY Rosa Elena Richard M.D.   250 mg at 04/20/19 0600   • benzocaine-menthol (CEPACOL) lozenge 1 Lozenge  1 Lozenge Mouth/Throat Q2HRS PRN Rosa Elena Richard M.D.   1 Lozenge at 04/19/19 1841   • benzonatate (TESSALON) capsule 100 mg  100 mg Oral TID PRN Rosa Elena Richard M.D.   100 mg at 04/20/19 2207   • enoxaparin (LOVENOX) inj 40 mg  40 mg Subcutaneous DAILY Rosa Elena Richard M.D.   40 mg at 04/20/19 0559   • methylPREDNISolone (SOLU-MEDROL) 40 MG injection 40 mg  40 mg Intravenous Q8HRS Rosa Elena Richard M.D.   40 mg at 04/20/19 2207       Fluids    Intake/Output Summary (Last 24 hours) at 04/20/19 2234  Last data filed at 04/20/19 2200   Gross per 24 hour   Intake             1960 ml   Output             2345 ml   Net             -385 ml       Laboratory  Recent Labs      04/19/19   0311 04/20/19   0432   DARPB63X  7.44   --    [id number]  29.2   --    [id number]  48.1*   --    IQBS4NDQ  82.7*   --    ARTHCO3  20   --    ARTBE  -3   --    ISTATAPH   --   7.356*   ISTATAPCO2   --   40.1*   ISTATAPO2   --   65   ISTATATCO2   --   24   [id number]   --   92*   ISTATARTHCO3   --   22.5   ISTATARTBE   --   -3   ISTATTEMP   --   97.0 F   ISTATFIO2   --   73   ISTATSPEC   --   Arterial   ISTATAPHTC   --   7.369*   SFOMMXCG3OD   --   62*     Recent Labs      04/19/19   0314   04/19/19   1725  04/19/19   2140  04/20/19   0210   TROPONINI  0.14*   < >  0.14*  0.07*  0.05*   BNPBTYPENAT  272*   --    --    --   280*    < > = values in this interval not displayed.     Recent Labs      04/19/19   0314  04/19/19   0908  04/20/19   0210   SODIUM  136  135  137   POTASSIUM  3.5*  3.5*  3.8   CHLORIDE  102  106  108   CO2  22  18*  21   BUN  19  15  10   CREATININE  0.90  0.85  0.66   CALCIUM  8.8   7.8*  8.3*     Recent Labs      04/19/19   0314  04/19/19   0908  04/20/19   0210   ALTSGPT  23   --    --    ASTSGOT  46*   --    --    ALKPHOSPHAT  47   --    --    TBILIRUBIN  0.8   --    --    GLUCOSE  190*  239*  156*     Recent Labs      04/19/19   0314  04/20/19   0210   WBC  29.2*  29.2*   NEUTSPOLYS  94.70*  95.60*   LYMPHOCYTES  1.70*  1.00*   MONOCYTES  2.40  1.80   EOSINOPHILS  0.00  0.00   BASOPHILS  0.50  0.30   ASTSGOT  46*   --    ALTSGPT  23   --    ALKPHOSPHAT  47   --    TBILIRUBIN  0.8   --      Recent Labs      04/19/19   0314  04/20/19   0210   RBC  5.59*  4.58   HEMOGLOBIN  16.5*  13.5   HEMATOCRIT  49.6*  41.5   PLATELETCT  261  230       Imaging  CT:    Reviewed    Assessment/Plan    Bilateral pneumonia in both upper lobes   ceftriaxone and Zithromax   Elevated  pro calcitonin    legionella titers pending  Cont HFNC, titrate to keep SpO2 around 92%  Pt is at high risk for deterioration and requiring mechanical ventilation   Follow up cultures   History of SOB for the last few months. CT in [facility] in november. We are not able to obtain yet   May have chronic ground glass opacities of unclear etiology. However, she is improving on prednisone and abx       Severe sepsis   Secondary to above   -Sepsis criteria: Tachypnea, tachycardia, leukocytosis.    Received 2 liters NS in the ER   And ivf in the floor. Now fluids d.emely and given lasix 20x1  Monitor for fluid overload   Lactic acid is trending down     Intake/Output Summary (Last 24 hours) at 04/20/19 2240  Last data filed at 04/20/19 2200   Gross per 24 hour   Intake             1960 ml   Output             2345 ml   Net             -385 ml         Demand ischemia (HCC)  -Initial troponin level 0.14 with a subsequent one was 0.04      Acute respiratory distress  Secondary to pneumonia/?ARDS/? superimposing Chronic ILD  -Oxygenation was around 70% upon arrival to the emergency department, per ER reports she was cyanotic.  -She is now on  HFNC, doing well on that, not using accessory  muscles   High risk for needing intubation.  Will obtain ct scan and pfts done in [facility] on November   Cont current management with iv steroids and abx       Hypoxia  -As above.    Lactic acid acidosis  -As above    History of asthma   Does not look to be on exacerbations   Cont duones         No new Assessment & Plan notes have been filed under this hospital service since the last note was generated.  Service: Pulmonary       VTE:  Lovenox  Ulcer: Not Indicated  Lines: None    I have performed a physical exam and reviewed and updated ROS and Plan today (4/20/2019). In review of yesterday's note (4/19/2019), there are no changes except as documented above.     Discussed patient condition and risk of morbidity and/or mortality with RN, RT, Pharmacy, Charge nurse / hot rounds and Patient  The patient remains critically ill.  Critical care time = 37 minutes in directly providing and coordinating critical care and extensive data review.  No time overlap and excludes procedures.

## 2019-04-22 PROBLEM — R73.9 HYPERGLYCEMIA: Status: ACTIVE | Noted: 2019-04-22

## 2019-04-22 PROBLEM — J96.01 ACUTE HYPOXEMIC RESPIRATORY FAILURE (HCC): Status: ACTIVE | Noted: 2019-04-22

## 2019-04-22 PROBLEM — D72.829 LEUKOCYTOSIS (LEUCOCYTOSIS): Status: ACTIVE | Noted: 2019-04-22

## 2019-04-22 PROBLEM — R65.21 SEPTIC SHOCK (HCC): Status: RESOLVED | Noted: 2019-04-19 | Resolved: 2019-04-22

## 2019-04-22 PROBLEM — J45.40 MODERATE PERSISTENT ASTHMA: Status: ACTIVE | Noted: 2019-04-22

## 2019-04-22 PROBLEM — A41.9 SEPTIC SHOCK (HCC): Status: RESOLVED | Noted: 2019-04-19 | Resolved: 2019-04-22

## 2019-04-22 LAB
BASOPHILS # BLD AUTO: 0.1 % (ref 0–1.8)
BASOPHILS # BLD: 0.02 K/UL (ref 0–0.12)
EOSINOPHIL # BLD AUTO: 0 K/UL (ref 0–0.51)
EOSINOPHIL NFR BLD: 0 % (ref 0–6.9)
ERYTHROCYTE [DISTWIDTH] IN BLOOD BY AUTOMATED COUNT: 45.8 FL (ref 35.9–50)
HCT VFR BLD AUTO: 41.5 % (ref 37–47)
HGB BLD-MCNC: 13.4 G/DL (ref 12–16)
IMM GRANULOCYTES # BLD AUTO: 0.03 K/UL (ref 0–0.11)
IMM GRANULOCYTES NFR BLD AUTO: 0.2 % (ref 0–0.9)
L PNEUMO IGG TITR SER IF: NORMAL {TITER}
LYMPHOCYTES # BLD AUTO: 0.27 K/UL (ref 1–4.8)
LYMPHOCYTES NFR BLD: 1.8 % (ref 22–41)
MCH RBC QN AUTO: 29.3 PG (ref 27–33)
MCHC RBC AUTO-ENTMCNC: 32.3 G/DL (ref 33.6–35)
MCV RBC AUTO: 90.8 FL (ref 81.4–97.8)
MONOCYTES # BLD AUTO: 0.44 K/UL (ref 0–0.85)
MONOCYTES NFR BLD AUTO: 3 % (ref 0–13.4)
NEUTROPHILS # BLD AUTO: 14.13 K/UL (ref 2–7.15)
NEUTROPHILS NFR BLD: 94.9 % (ref 44–72)
NRBC # BLD AUTO: 0 K/UL
NRBC BLD-RTO: 0 /100 WBC
PLATELET # BLD AUTO: 258 K/UL (ref 164–446)
PMV BLD AUTO: 9.8 FL (ref 9–12.9)
RBC # BLD AUTO: 4.57 M/UL (ref 4.2–5.4)
WBC # BLD AUTO: 14.9 K/UL (ref 4.8–10.8)

## 2019-04-22 PROCEDURE — 94760 N-INVAS EAR/PLS OXIMETRY 1: CPT

## 2019-04-22 PROCEDURE — 99233 SBSQ HOSP IP/OBS HIGH 50: CPT | Performed by: INTERNAL MEDICINE

## 2019-04-22 PROCEDURE — 94640 AIRWAY INHALATION TREATMENT: CPT

## 2019-04-22 PROCEDURE — 85025 COMPLETE CBC W/AUTO DIFF WBC: CPT

## 2019-04-22 PROCEDURE — 700111 HCHG RX REV CODE 636 W/ 250 OVERRIDE (IP): Performed by: INTERNAL MEDICINE

## 2019-04-22 PROCEDURE — A9270 NON-COVERED ITEM OR SERVICE: HCPCS | Performed by: INTERNAL MEDICINE

## 2019-04-22 PROCEDURE — 700102 HCHG RX REV CODE 250 W/ 637 OVERRIDE(OP): Performed by: INTERNAL MEDICINE

## 2019-04-22 PROCEDURE — 700105 HCHG RX REV CODE 258: Performed by: INTERNAL MEDICINE

## 2019-04-22 PROCEDURE — 700102 HCHG RX REV CODE 250 W/ 637 OVERRIDE(OP): Performed by: HOSPITALIST

## 2019-04-22 PROCEDURE — 94668 MNPJ CHEST WALL SBSQ: CPT

## 2019-04-22 PROCEDURE — 700101 HCHG RX REV CODE 250: Performed by: HOSPITALIST

## 2019-04-22 PROCEDURE — 770006 HCHG ROOM/CARE - MED/SURG/GYN SEMI*

## 2019-04-22 PROCEDURE — A9270 NON-COVERED ITEM OR SERVICE: HCPCS | Performed by: HOSPITALIST

## 2019-04-22 RX ORDER — FLUTICASONE PROPIONATE 110 UG/1
2 AEROSOL, METERED RESPIRATORY (INHALATION)
Status: DISCONTINUED | OUTPATIENT
Start: 2019-04-22 | End: 2019-04-24 | Stop reason: HOSPADM

## 2019-04-22 RX ORDER — PREDNISONE 20 MG/1
40 TABLET ORAL DAILY
Status: DISCONTINUED | OUTPATIENT
Start: 2019-04-22 | End: 2019-04-24 | Stop reason: HOSPADM

## 2019-04-22 RX ADMIN — IPRATROPIUM BROMIDE AND ALBUTEROL SULFATE 3 ML: .5; 3 SOLUTION RESPIRATORY (INHALATION) at 06:34

## 2019-04-22 RX ADMIN — FLUTICASONE PROPIONATE 220 MCG: 110 AEROSOL, METERED RESPIRATORY (INHALATION) at 19:48

## 2019-04-22 RX ADMIN — LEVOTHYROXINE SODIUM 88 MCG: 88 TABLET ORAL at 06:21

## 2019-04-22 RX ADMIN — IPRATROPIUM BROMIDE AND ALBUTEROL SULFATE 3 ML: .5; 3 SOLUTION RESPIRATORY (INHALATION) at 15:09

## 2019-04-22 RX ADMIN — IPRATROPIUM BROMIDE AND ALBUTEROL SULFATE 3 ML: .5; 3 SOLUTION RESPIRATORY (INHALATION) at 10:27

## 2019-04-22 RX ADMIN — CEFTRIAXONE SODIUM 2 G: 2 INJECTION, POWDER, FOR SOLUTION INTRAMUSCULAR; INTRAVENOUS at 06:21

## 2019-04-22 RX ADMIN — PREDNISONE 40 MG: 20 TABLET ORAL at 12:15

## 2019-04-22 RX ADMIN — IPRATROPIUM BROMIDE AND ALBUTEROL SULFATE 3 ML: .5; 3 SOLUTION RESPIRATORY (INHALATION) at 19:48

## 2019-04-22 RX ADMIN — AZITHROMYCIN 250 MG: 250 TABLET, FILM COATED ORAL at 06:20

## 2019-04-22 RX ADMIN — ENOXAPARIN SODIUM 40 MG: 100 INJECTION SUBCUTANEOUS at 06:21

## 2019-04-22 RX ADMIN — METHYLPREDNISOLONE SODIUM SUCCINATE 40 MG: 40 INJECTION, POWDER, FOR SOLUTION INTRAMUSCULAR; INTRAVENOUS at 06:20

## 2019-04-22 RX ADMIN — BUDESONIDE AND FORMOTEROL FUMARATE DIHYDRATE 2 PUFF: 80; 4.5 AEROSOL RESPIRATORY (INHALATION) at 06:35

## 2019-04-22 ASSESSMENT — ENCOUNTER SYMPTOMS
COUGH: 1
VOMITING: 0
SINUS PAIN: 0
DOUBLE VISION: 0
HEMOPTYSIS: 0
DIZZINESS: 0
DIAPHORESIS: 0
BLURRED VISION: 0
SEIZURES: 0
LOSS OF CONSCIOUSNESS: 0
STRIDOR: 0
SPEECH CHANGE: 0
FEVER: 0
WHEEZING: 0
SORE THROAT: 0
CHILLS: 0
PALPITATIONS: 0
WEAKNESS: 0
NAUSEA: 0
HEARTBURN: 0
WEIGHT LOSS: 0
ABDOMINAL PAIN: 0
SHORTNESS OF BREATH: 0
SPUTUM PRODUCTION: 0
MYALGIAS: 0

## 2019-04-22 NOTE — FLOWSHEET NOTE
This note also relates to the following rows which could not be included:  Respiration - Cannot attach notes to unvalidated device data  Pulse - Cannot attach notes to unvalidated device data  Heart Rate (Monitored) - Cannot attach notes to unvalidated device data  Pulse Oximetry - Cannot attach notes to unvalidated device data       04/21/19 1935   Events/Summary/Plan   Non-Invasive Resp Device Site Inspection Completed Intact   Interdisciplinary Plan of Care-Goals (Indications)   Bronchodilator Indications History / Diagnosis   Interdisciplinary Plan of Care-Outcomes    Bronchodilator Outcome Improved Vital Signs and Measures of Gas Exchange   Hyperinflation Protocol Goals/Outcome Improvement in Repeat CXR   Education   Education Yes - Pt. / Family has been Instructed in use of Respiratory Equipment;Yes - Pt. / Family has been Instructed in use of Respiratory Medications and Adverse Reactions   RT Assessment of Delivered Medications   Evaluation of Medication Delivery Daily Yes-- Pt /Family has been Instructed in use of Respiratory Medications and Adverse Reactions   SVN Group   #SVN Performed Yes   Given By: Mouthpiece   MDI/DPI Group   #MDI/DPI Given MDI/DPI x 1   PEP/CPT Group   PEP/CPT/Airway Clearance Therapy Yes   #PEP/CPT (Manual) Subsequent Subsequent   PEP/CPT Method (thera pep)   Incentive Spirometry Group   Breathing Exercises Yes   Incentive Spirometer Volume 1750 mL   Respiratory WDL   Respiratory (WDL) X   Chest Exam   Work Of Breathing / Effort Mild   Breath Sounds   Pre/Post Intervention Post Intervention Assessment   RUL Breath Sounds Fine Crackles   RML Breath Sounds Fine Crackles   RLL Breath Sounds Diminished   BECCA Breath Sounds Fine Crackles   LLL Breath Sounds Diminished   Secretions   Cough Non Productive;Strong   Oxygen   O2 (LPM) 10   O2 Daily Delivery Respiratory  Nasal Cannula

## 2019-04-22 NOTE — PROGRESS NOTES
Critical Care Progress Note    Date of admission  4/19/2019    Chief Complaint  58 y.o. female admitted 4/19/2019 with dry cough and dyspnea x2 days     Hospital Course  4/19 admitted with severe dyspnea and dry cough as well as fever x2 days  CT chest showed BL pulm edema   4/19-20 required HFNC 50 LPM in ICU   Echo EF 65% no diastolic dysfunction or valve disease  PCT was 4.14 then down to 1.67 with rocephin + azithromycin   4/22 down to 5 LPM with significant symptomatic improvement       Interval Problem Update  Reviewed last 24 hour events:  NSR  Oxygen down to 5 LPM with SpO2 92%+  AM CXR showed persisting fluffy infiltrates bilaterally   UO 2670       Review of Systems  Review of Systems   Constitutional: Negative for chills, diaphoresis, fever, malaise/fatigue and weight loss.   HENT: Negative for congestion, nosebleeds, sinus pain and sore throat.    Eyes: Negative for blurred vision and double vision.   Respiratory: Positive for cough. Negative for hemoptysis, sputum production, shortness of breath, wheezing and stridor.    Cardiovascular: Negative for chest pain and palpitations.   Gastrointestinal: Negative for abdominal pain, heartburn, nausea and vomiting.   Musculoskeletal: Negative for joint pain and myalgias.   Skin: Negative for itching and rash.   Neurological: Negative for dizziness, speech change, seizures, loss of consciousness and weakness.        Vital Signs for last 24 hours   Temp:  [36.1 °C (97 °F)-36.6 °C (97.9 °F)] 36.1 °C (97 °F)  Pulse:  [52-83] 52  Resp:  [15-58] 26  SpO2:  [89 %-96 %] 90 %      Physical Exam   Physical Exam   Constitutional: She is oriented to person, place, and time. No distress.   HENT:   Head: Normocephalic and atraumatic.   Eyes: Right eye exhibits no discharge. Left eye exhibits no discharge. No scleral icterus.   Neck: No tracheal deviation present. No thyromegaly present.   Cardiovascular: Normal rate, regular rhythm, normal heart sounds and intact distal  pulses.  Exam reveals no gallop and no friction rub.    No murmur heard.  Pulmonary/Chest: Effort normal. No stridor. No respiratory distress. She has no wheezes. She has rales. She exhibits no tenderness.   Diffuse biphasic rales    Abdominal: Soft. She exhibits no distension. There is no tenderness.   Musculoskeletal: She exhibits no edema, tenderness or deformity.   Lymphadenopathy:     She has no cervical adenopathy.   Neurological: She is alert and oriented to person, place, and time.   Skin: Skin is warm. No rash noted. She is not diaphoretic. No erythema. No pallor.   Psychiatric: She has a normal mood and affect. Her behavior is normal. Judgment and thought content normal.   Nursing note and vitals reviewed.      Medications  Current Facility-Administered Medications   Medication Dose Route Frequency Provider Last Rate Last Dose   • levothyroxine (SYNTHROID) tablet 88 mcg  88 mcg Oral AM ES Rosalina Amaya M.D.   88 mcg at 04/22/19 0621   • senna-docusate (PERICOLACE or SENOKOT S) 8.6-50 MG per tablet 2 Tab  2 Tab Oral BID Rosalina Amaya M.D.   Stopped at 04/20/19 0600    And   • polyethylene glycol/lytes (MIRALAX) PACKET 1 Packet  1 Packet Oral QDAY PRN Rosalina Amaya M.D.        And   • magnesium hydroxide (MILK OF MAGNESIA) suspension 30 mL  30 mL Oral QDAY PRN Rosalina Amaya M.D.        And   • bisacodyl (DULCOLAX) suppository 10 mg  10 mg Rectal QDAY PRN Rosalina Amaya M.D.       • Respiratory Care per Protocol   Nebulization Continuous RT Rosalina Amaya M.D.       • acetaminophen (TYLENOL) tablet 650 mg  650 mg Oral Q6HRS PRN Rosalina Amaya M.D.   650 mg at 04/19/19 1209   • guaiFENesin dextromethorphan (ROBITUSSIN DM) 100-10 MG/5ML syrup 10 mL  10 mL Oral Q6HRS PRN Rosalina Amaya M.D.   10 mL at 04/21/19 2107   • ipratropium-albuterol (DUONEB) nebulizer solution  3 mL Nebulization 4X/DAY (RT) Rosalina Amaya M.D.   3 mL at  04/21/19 1933   • budesonide-formoterol (SYMBICORT) 80-4.5 MCG/ACT inhaler 2 Puff  2 Puff Inhalation BID (RT) Rosalina Amaya M.D.   2 Puff at 04/21/19 1934   • albuterol (PROVENTIL) 2.5mg/0.5ml nebulizer solution 2.5 mg  2.5 mg Nebulization RT EVERY 1 HOUR PRN Rosalina Amaya M.D.       • cefTRIAXone (ROCEPHIN) 2 g in  mL IVPB  2 g Intravenous Q24HRS Rosa Elena Richard M.D. 200 mL/hr at 04/22/19 0621 2 g at 04/22/19 0621   • azithromycin (ZITHROMAX) tablet 250 mg  250 mg Oral DAILY Rosa Elena Richard M.D.   250 mg at 04/22/19 0620   • benzocaine-menthol (CEPACOL) lozenge 1 Lozenge  1 Lozenge Mouth/Throat Q2HRS PRN Rosa Elena Richard M.D.   1 Lozenge at 04/19/19 1841   • benzonatate (TESSALON) capsule 100 mg  100 mg Oral TID PRN Rosa Elena Richard M.D.   100 mg at 04/21/19 2107   • enoxaparin (LOVENOX) inj 40 mg  40 mg Subcutaneous DAILY Rosa Elena Richard M.D.   40 mg at 04/22/19 0621   • methylPREDNISolone (SOLU-MEDROL) 40 MG injection 40 mg  40 mg Intravenous Q8HRS Rosa Elena Richard M.D.   40 mg at 04/22/19 0620       Fluids    Intake/Output Summary (Last 24 hours) at 04/22/19 0626  Last data filed at 04/22/19 0200   Gross per 24 hour   Intake             1020 ml   Output             2670 ml   Net            -1650 ml       Laboratory  Recent Labs      04/20/19   0432   ISTATAPH  7.356*   ISTATAPCO2  40.1*   ISTATAPO2  65   ISTATATCO2  24   [id number]  92*   ISTATARTHCO3  22.5   ISTATARTBE  -3   ISTATTEMP  97.0 F   ISTATFIO2  73   ISTATSPEC  Arterial   ISTATAPHTC  7.369*   [id number]  62*     Recent Labs      04/19/19   1725  04/19/19   2140  04/20/19   0210  04/21/19   1000   TROPONINI  0.14*  0.07*  0.05*   --    BNPBTYPENAT   --    --   280*  360*     Recent Labs      04/19/19   0908  04/20/19   0210  04/21/19   1000   SODIUM  135  137  140   POTASSIUM  3.5*  3.8  4.6   CHLORIDE  106  108  108   CO2  18*  21  22   BUN  15  10  16   CREATININE  0.85  0.66  0.56   CALCIUM  7.8*  8.3*  9.4     Recent Labs       "04/19/19   0908  04/20/19   0210  04/21/19   1000   GLUCOSE  239*  156*  263*     Recent Labs      04/20/19   0210  04/21/19   0315  04/22/19   0240   WBC  29.2*  22.5*  14.9*   NEUTSPOLYS  95.60*  95.10*  94.90*   LYMPHOCYTES  1.00*  1.40*  1.80*   MONOCYTES  1.80  1.80  3.00   EOSINOPHILS  0.00  0.00  0.00   BASOPHILS  0.30  0.10  0.10     Recent Labs      04/20/19   0210  04/21/19   0315  04/22/19   0240   RBC  4.58  4.31  4.57   HEMOGLOBIN  13.5  12.7  13.4   HEMATOCRIT  41.5  39.5  41.5   PLATELETCT  230  245  258       Imaging  X-Ray:  I have personally reviewed the images and compared with prior images.  CT:    Reviewed    Assessment/Plan  Acute hypoxemic respiratory failure (HCC)   Assessment & Plan    Initially required HFNC for 48 hrs and now down to 5 LPM  Titrate oxygen for SpO2 >= 88%  CXR in AM   Transfer to medical   Will continue to follow and consider bronch with BAL tomorrow if CXR shows persisting infiltrates        Moderate persistent asthma   Assessment & Plan    On Arnuity at home   Change symbicort to flovent \"same as arnuity- fluticasone\"  Change iv solumedrol to prednisone 40 mg po daily x7 days then stop        Atypical pneumonia   Assessment & Plan    Received CAP Coverage with Ceftriaxone and Azithromycin   Tomorrow is last day for both \"5 days course\"  CXR in AM   Waiting on [facility] CT November and PFTs      Hyperglycemia   Assessment & Plan    Secondary to IV steroids 40 q8   Should improve as I changed her to prednsione 40 po daily today   Continue to monitor      Leukocytosis (leucocytosis)   Assessment & Plan    Likely from steroids, down to 14.9 today   Continue to trend daily CBC        I have performed a physical exam and reviewed and updated ROS and Plan today (4/22/2019). In review of yesterday's note (4/21/2019), there are no changes except as documented above.     "

## 2019-04-22 NOTE — CARE PLAN
Problem: Communication  Goal: The ability to communicate needs accurately and effectively will improve  Patient calls appropriately for any and all needs.     Problem: Knowledge Deficit  Goal: Knowledge of disease process/condition, treatment plan, diagnostic tests, and medications will improve  POC discussed with patient. All questions answered. Patient verbalized understanding.

## 2019-04-22 NOTE — FLOWSHEET NOTE
04/22/19 1510   Interdisciplinary Plan of Care-Goals (Indications)   Bronchodilator Indications History / Diagnosis   Interdisciplinary Plan of Care-Outcomes    Bronchodilator Outcome Patient at Stable Baseline   RT Assessment of Delivered Medications   Evaluation of Medication Delivery Daily Yes-- Pt /Family has been Instructed in use of Respiratory Medications and Adverse Reactions   SVN Group   #SVN Performed Yes   Given By: Mouthpiece   Respiratory WDL   Respiratory (WDL) X   Chest Exam   Respiration (!) 24   Heart Rate (Monitored) 70   Breath Sounds   Pre/Post Intervention Pre Intervention Assessment   RUL Breath Sounds Diminished   RML Breath Sounds Diminished   RLL Breath Sounds Diminished   BECCA Breath Sounds Diminished   LLL Breath Sounds Diminished   Oxygen   Pulse Oximetry 92 %   O2 (LPM) 5   O2 Daily Delivery Respiratory  Silicone Nasal Cannula

## 2019-04-22 NOTE — CARE PLAN
Problem: Respiratory:  Goal: Respiratory status will improve  Outcome: PROGRESSING AS EXPECTED  Patient's oxygen demand will not increase over shift.  Patient started shift on 10L and is currently on 4L NC.    Problem: Safety  Goal: Will remain free from injury  Outcome: PROGRESSING AS EXPECTED  Appropriate interventions in place.  See FS for details.

## 2019-04-22 NOTE — FLOWSHEET NOTE
04/22/19 1028   Interdisciplinary Plan of Care-Goals (Indications)   Bronchodilator Indications History / Diagnosis   Bronchopulmonary Hygiene Indications Difficulty with Secretion Clearance   Interdisciplinary Plan of Care-Outcomes    Bronchodilator Outcome Patient at Stable Baseline   Bronchopulmonary Hygiene Outcome Patient at Stable Baseline   RT Assessment of Delivered Medications   Evaluation of Medication Delivery Daily Yes-- Pt /Family has been Instructed in use of Respiratory Medications and Adverse Reactions   SVN Group   #SVN Performed Yes   Given By: Mouthpiece   PEP/CPT Group   PEP/CPT/Airway Clearance Therapy Yes   #PEP/CPT (Manual) Subsequent Subsequent   PEP/CPT Method Positive Airway Pressure Device   Incentive Spirometry Group   Breathing Exercises Yes   Incentive Spirometer Volume 1750 mL   Respiratory WDL   Respiratory (WDL) X   Chest Exam   Respiration (!) 24   Pulse (!) 58   Heart Rate (Monitored) (!) 58   Breath Sounds   Pre/Post Intervention Pre Intervention Assessment   RUL Breath Sounds Diminished   RML Breath Sounds Diminished   RLL Breath Sounds Diminished   BECCA Breath Sounds Diminished   LLL Breath Sounds Diminished   Secretions   Cough Strong;Non Productive   How Sputum Obtained Spontaneous   Oximetry   #Pulse Oximetry (Single Determination) Yes   Oxygen   Pulse Oximetry 94 %   O2 (LPM) 5   O2 Daily Delivery Respiratory  Silicone Nasal Cannula

## 2019-04-22 NOTE — PROGRESS NOTES
Patient markedly improved compared to previous 2 nights.  Down to 5L NC.  Fine crackles in upper lung fiends still auscultated, but significantly less diminished throughout.  Patient requires minimal assistance to bedside commode but does desaturate to ~ spO2 85%.  Once back to bed patient recovers within 2-3 minutes. Patient is motivated in performing IS / RT treatments and has expressed desire to use a real washroom and shower.

## 2019-04-22 NOTE — FLOWSHEET NOTE
04/22/19 0635   Interdisciplinary Plan of Care-Goals (Indications)   Bronchodilator Indications History / Diagnosis   Bronchopulmonary Hygiene Indications Difficulty with Secretion Clearance   Interdisciplinary Plan of Care-Outcomes    Bronchodilator Outcome Patient at Stable Baseline   Bronchopulmonary Hygiene Outcome Patient at Stable Baseline   Education   Education Yes - Pt. / Family has been Instructed in use of Respiratory Equipment;Yes - Pt. / Family has been Instructed in use of Respiratory Medications and Adverse Reactions   RT Assessment of Delivered Medications   Evaluation of Medication Delivery Daily Yes-- Pt /Family has been Instructed in use of Respiratory Medications and Adverse Reactions   SVN Group   #SVN Performed Yes   Given By: Mouthpiece   MDI/DPI Group   #MDI/DPI Given MDI/DPI x 1   PEP/CPT Group   PEP/CPT/Airway Clearance Therapy Yes   #PEP/CPT (Manual) Subsequent Subsequent   Incentive Spirometry Group   Incentive Spirometry Instruction Yes   Breathing Exercises Yes   Incentive Spirometer Volume 1750 mL   Respiratory WDL   Respiratory (WDL) X   Chest Exam   Work Of Breathing / Effort Mild   Respiration 20   Pulse (!) 55   Heart Rate (Monitored) (!) 55   Breath Sounds   Pre/Post Intervention Pre Intervention Assessment   RUL Breath Sounds Clear;Diminished   RML Breath Sounds Clear;Diminished   RLL Breath Sounds Diminished   BECCA Breath Sounds Fine Crackles;Diminished   LLL Breath Sounds Fine Crackles;Diminished   Oximetry   #Pulse Oximetry (Single Determination) Yes   Oxygen   Pulse Oximetry 90 %   O2 (LPM) 5   O2 Daily Delivery Respiratory  Silicone Nasal Cannula

## 2019-04-22 NOTE — FLOWSHEET NOTE
04/22/19 1401   Chest Exam   Respiration (!) 27   Pulse 80   Heart Rate (Monitored) 69   Oxygen   Pulse Oximetry 92 %   O2 (LPM) 3   O2 Daily Delivery Respiratory  Silicone Nasal Cannula

## 2019-04-22 NOTE — PROGRESS NOTES
Received records from [place]  CT chest in November showed numerous ground glass opacities similar in description to current findings  Suspect underlying ILD- Sarcoid vs unusual infectious PNA vs other ILD  No mediastinal lymphadenopathy   Will proceed with Bronch w/Transbronchial bx tomorrow  NPO midnight  Check PT/INR  Check HIV status given persisting lymphopenia

## 2019-04-22 NOTE — PROGRESS NOTES
Report received. Assumed care of patient. Patient is sitting up in bed watching television. Patient alert, oriented and pleasant. RN and patient spoke about recent travel to [place] and the hotel room being filled mold and both her daughter and grandchild being ill as well. RN will speak with MD regarding this. Patient up to commode and back to bed, provided with breakfast. All needs are currently met. All safety precautions in place. Will continue to monitor.

## 2019-04-22 NOTE — PROGRESS NOTES
This RN ambulated with patient to 309-2. All belongs, chart and medications moved with patient. Patient set up for shower, safe to shower independently. Placed on 5 L NC for ambulation and shower.

## 2019-04-22 NOTE — PROGRESS NOTES
Critical Care Progress Note    Date of admission  4/19/2019    Chief Complaint  58 y.o. female admitted 4/19/2019 with sob, dry cough and fever and chills for two days   Hospital Course    admitted from ER this morning. Started on CAP coverage   Influenza titers were negative       Interval Problem Update  Reviewed last 24 hour events:  Cultures all negative so far   pro calcitonin 1.6 down from 4.4  Wbc 22k  Down from 29  Off HFNC on regular NC 5 l.\/m  CT chest showed extensive ground glass opacities in both upper lobes   Lactic acid normalized   BNP ~350  [id number] today 4/21  Echo mostly unremarkable    significant clinical improvement today   d    Review of Systems  Review of Systems   Constitutional: Positive for chills and fever.   HENT: Positive for sinus pain and sore throat.    Eyes: Negative.    Respiratory: Positive for cough and shortness of breath. Negative for hemoptysis, sputum production and wheezing.    Cardiovascular: Negative.    Gastrointestinal: Negative.    Genitourinary: Negative.    Musculoskeletal: Negative.    Skin: Negative.    Neurological: Negative.    Endo/Heme/Allergies: Negative.    Psychiatric/Behavioral: Negative.         Vital Signs for last 24 hours   Temp:  [36.3 °C (97.3 °F)-36.6 °C (97.9 °F)] 36.3 °C (97.4 °F)  Pulse:  [52-88] 76  Resp:  [15-58] 23  SpO2:  [89 %-96 %] 91 %    Hemodynamic parameters for last 24 hours       Respiratory Information for the last 24 hours       Physical Exam   Physical Exam   Constitutional: She is oriented to person, place, and time. She appears well-developed and well-nourished. No distress.   HENT:   Head: Normocephalic and atraumatic.   Right Ear: External ear normal.   Left Ear: External ear normal.   Nose: Nose normal.   Mouth/Throat: Oropharynx is clear and moist.   Eyes: Pupils are equal, round, and reactive to light. Conjunctivae and EOM are normal. Right eye exhibits discharge. Left eye exhibits no discharge. No scleral icterus.   Neck:  Neck supple. No JVD present. No tracheal deviation present.   Cardiovascular: Regular rhythm.  Exam reveals no friction rub.    No murmur heard.  Pulmonary/Chest: No respiratory distress. She has no wheezes. She has no rales.   Abdominal: Soft. She exhibits no distension. There is no tenderness.   Neurological: She is alert and oriented to person, place, and time. No cranial nerve deficit. Coordination normal.   Skin: Skin is warm and dry. No rash noted. She is not diaphoretic. No erythema.       Medications  Current Facility-Administered Medications   Medication Dose Route Frequency Provider Last Rate Last Dose   • levothyroxine (SYNTHROID) tablet 88 mcg  88 mcg Oral AM ES Rosalina Amaya M.D.   88 mcg at 04/21/19 0626   • senna-docusate (PERICOLACE or SENOKOT S) 8.6-50 MG per tablet 2 Tab  2 Tab Oral BID Rosalina Amaya M.D.   Stopped at 04/20/19 0600    And   • polyethylene glycol/lytes (MIRALAX) PACKET 1 Packet  1 Packet Oral QDAY PRN Rosalina Amaya M.D.        And   • magnesium hydroxide (MILK OF MAGNESIA) suspension 30 mL  30 mL Oral QDAY PRN Rosalina Amaya M.D.        And   • bisacodyl (DULCOLAX) suppository 10 mg  10 mg Rectal QDAY PRN Rosalina Amaya M.D.       • Respiratory Care per Protocol   Nebulization Continuous RT Rosalina Amaya M.D.       • acetaminophen (TYLENOL) tablet 650 mg  650 mg Oral Q6HRS PRN Rosalina Amaya M.D.   650 mg at 04/19/19 1209   • guaiFENesin dextromethorphan (ROBITUSSIN DM) 100-10 MG/5ML syrup 10 mL  10 mL Oral Q6HRS PRN Rosalina Amaya M.D.   10 mL at 04/21/19 2107   • ipratropium-albuterol (DUONEB) nebulizer solution  3 mL Nebulization 4X/DAY (RT) Rosalina Amaya M.D.   3 mL at 04/21/19 1933   • budesonide-formoterol (SYMBICORT) 80-4.5 MCG/ACT inhaler 2 Puff  2 Puff Inhalation BID (RT) Rosalina Amaya M.D.   2 Puff at 04/21/19 1934   • albuterol (PROVENTIL) 2.5mg/0.5ml nebulizer solution 2.5 mg   2.5 mg Nebulization RT EVERY 1 HOUR PRN Rosalina Amaya M.D.       • cefTRIAXone (ROCEPHIN) 2 g in  mL IVPB  2 g Intravenous Q24HRS Rosa Elena Richard M.D.   Stopped at 04/21/19 0656   • azithromycin (ZITHROMAX) tablet 250 mg  250 mg Oral DAILY Rosa Elena Richard M.D.   250 mg at 04/21/19 0626   • benzocaine-menthol (CEPACOL) lozenge 1 Lozenge  1 Lozenge Mouth/Throat Q2HRS PRN Rosa Elena Richard M.D.   1 Lozenge at 04/19/19 1841   • benzonatate (TESSALON) capsule 100 mg  100 mg Oral TID PRN Rosa Elena Richard M.D.   100 mg at 04/21/19 2107   • enoxaparin (LOVENOX) inj 40 mg  40 mg Subcutaneous DAILY Rosa Elena Richard M.D.   40 mg at 04/21/19 0627   • methylPREDNISolone (SOLU-MEDROL) 40 MG injection 40 mg  40 mg Intravenous Q8HRS Rosa Elena Richard M.D.   40 mg at 04/21/19 2107       Fluids    Intake/Output Summary (Last 24 hours) at 04/21/19 2158  Last data filed at 04/21/19 2000   Gross per 24 hour   Intake              620 ml   Output             2845 ml   Net            -2225 ml       Laboratory  Recent Labs      04/19/19   0311 04/20/19   0432   VOAEY21P  7.44   --    [id number]  29.2   --    DNPVQ900N  48.1*   --    [id number]  82.7*   --    ARTHCO3  20   --    ARTBE  -3   --    ISTATAPH   --   7.356*   ISTATAPCO2   --   40.1*   ISTATAPO2   --   65   ISTATATCO2   --   24   [id number]   --   92*   ISTATARTHCO3   --   22.5   ISTATARTBE   --   -3   ISTATTEMP   --   97.0 F   ISTATFIO2   --   73   ISTATSPEC   --   Arterial   ISTATAPHTC   --   7.369*   [id number]   --   62*     Recent Labs      04/19/19   0314   04/19/19   1725  04/19/19   2140  04/20/19   0210  04/21/19   1000   TROPONINI  0.14*   < >  0.14*  0.07*  0.05*   --    BNPBTYPENAT  272*   --    --    --   280*  360*    < > = values in this interval not displayed.     Recent Labs      04/19/19   0908  04/20/19   0210  04/21/19   1000   SODIUM  135  137  140   POTASSIUM  3.5*  3.8  4.6   CHLORIDE  106  108  108   CO2  18*  21  22   BUN  15  10  16   CREATININE   0.85  0.66  0.56   CALCIUM  7.8*  8.3*  9.4     Recent Labs      04/19/19   0314  04/19/19   0908  04/20/19   0210  04/21/19   1000   ALTSGPT  23   --    --    --    ASTSGOT  46*   --    --    --    ALKPHOSPHAT  47   --    --    --    TBILIRUBIN  0.8   --    --    --    GLUCOSE  190*  239*  156*  263*     Recent Labs      04/19/19   0314  04/20/19 0210 04/21/19   0315   WBC  29.2*  29.2*  22.5*   NEUTSPOLYS  94.70*  95.60*  95.10*   LYMPHOCYTES  1.70*  1.00*  1.40*   MONOCYTES  2.40  1.80  1.80   EOSINOPHILS  0.00  0.00  0.00   BASOPHILS  0.50  0.30  0.10   ASTSGOT  46*   --    --    ALTSGPT  23   --    --    ALKPHOSPHAT  47   --    --    TBILIRUBIN  0.8   --    --      Recent Labs      04/19/19   0314 04/20/19 0210 04/21/19   0315   RBC  5.59*  4.58  4.31   HEMOGLOBIN  16.5*  13.5  12.7   HEMATOCRIT  49.6*  41.5  39.5   PLATELETCT  261  230  245       Imaging  CT:    Reviewed    Assessment/Plan    Bilateral pneumonia in both upper lobes   ceftriaxone and Zithromax   Elevated  pro calcitonin trending down    legionella titers pending  Off HFNC  Significant improvement today    Follow up cultures   History of SOB for the last few months. CT in [facility] in november. We are not able to obtain yet   May have chronic ground glass opacities of unclear etiology. However, she is improving on prednisone and abx  We need to obtain old records from [facility]        Severe sepsis   Secondary to above   -Sepsis criteria: Tachypnea, tachycardia, leukocytosis.    Received 2 liters NS in the ER   And ivf in the floor. Now fluids d.emely and given lasix 20x1 today  Lactic acid normalized   Intake/Output Summary (Last 24 hours) at 04/20/19 2240  Last data filed at 04/20/19 2200   Gross per 24 hour   Intake             1960 ml   Output             2345 ml   Net             -385 ml         Demand ischemia (HCC)  -Initial troponin level 0.14 with a subsequent one was 0.04      Acute respiratory distress  Secondary to  pneumonia/?ARDS/? superimposing Chronic ILD  -Oxygenation was around 70% upon arrival to the emergency department, per ER reports she was cyanotic.  -significant improvement today off HFNC  Will obtain ct scan and pfts done in [facility] on November   Cont current management with iv steroids and abx       Hypoxia  -As above.    Lactic acid acidosis  -As above    History of asthma   Cont duones   On iv solumedrol, for likely a component of exacerbation from her pneumonia        No new Assessment & Plan notes have been filed under this hospital service since the last note was generated.  Service: Pulmonary       VTE:  Lovenox  Ulcer: Not Indicated  Lines: None    I have performed a physical exam and reviewed and updated ROS and Plan today (4/21/2019). In review of yesterday's note (4/20/2019), there are no changes except as documented above.     Discussed patient condition and risk of morbidity and/or mortality with RN, RT, Pharmacy, Charge nurse / hot rounds and Patient

## 2019-04-23 ENCOUNTER — ANESTHESIA EVENT (OUTPATIENT)
Dept: SURGERY | Facility: MEDICAL CENTER | Age: 59
DRG: 853 | End: 2019-04-23
Payer: COMMERCIAL

## 2019-04-23 ENCOUNTER — ANESTHESIA (OUTPATIENT)
Dept: SURGERY | Facility: MEDICAL CENTER | Age: 59
DRG: 853 | End: 2019-04-23
Payer: COMMERCIAL

## 2019-04-23 ENCOUNTER — APPOINTMENT (OUTPATIENT)
Dept: RADIOLOGY | Facility: MEDICAL CENTER | Age: 59
DRG: 853 | End: 2019-04-23
Attending: INTERNAL MEDICINE
Payer: COMMERCIAL

## 2019-04-23 PROBLEM — J96.01 ACUTE RESPIRATORY FAILURE WITH HYPOXIA (HCC): Status: ACTIVE | Noted: 2019-04-19

## 2019-04-23 PROBLEM — R09.02 HYPOXIA: Status: RESOLVED | Noted: 2019-04-19 | Resolved: 2019-04-23

## 2019-04-23 LAB
ANION GAP SERPL CALC-SCNC: 8 MMOL/L (ref 0–11.9)
APPEARANCE FLD: NORMAL
BASOPHILS # BLD AUTO: 0.2 % (ref 0–1.8)
BASOPHILS # BLD: 0.02 K/UL (ref 0–0.12)
BODY FLD TYPE: NORMAL
BUN SERPL-MCNC: 13 MG/DL (ref 8–22)
CALCIUM SERPL-MCNC: 8.8 MG/DL (ref 8.4–10.2)
CHLORIDE SERPL-SCNC: 104 MMOL/L (ref 96–112)
CO2 SERPL-SCNC: 28 MMOL/L (ref 20–33)
COLOR FLD: NORMAL
CREAT SERPL-MCNC: 0.51 MG/DL (ref 0.5–1.4)
CSF COMMENTS 1658: NORMAL
CYTOLOGY REG CYTOL: NORMAL
EOSINOPHIL # BLD AUTO: 0.02 K/UL (ref 0–0.51)
EOSINOPHIL NFR BLD: 0.2 % (ref 0–6.9)
ERYTHROCYTE [DISTWIDTH] IN BLOOD BY AUTOMATED COUNT: 44.7 FL (ref 35.9–50)
GLUCOSE SERPL-MCNC: 122 MG/DL (ref 65–99)
HCT VFR BLD AUTO: 41.8 % (ref 37–47)
HGB BLD-MCNC: 13.7 G/DL (ref 12–16)
HIV 1+2 AB+HIV1 P24 AG SERPL QL IA: NON REACTIVE
IMM GRANULOCYTES # BLD AUTO: 0.1 K/UL (ref 0–0.11)
IMM GRANULOCYTES NFR BLD AUTO: 1 % (ref 0–0.9)
INR PPP: 1.02 (ref 0.87–1.13)
LYMPHOCYTES # BLD AUTO: 0.59 K/UL (ref 1–4.8)
LYMPHOCYTES NFR BLD: 5.8 % (ref 22–41)
MCH RBC QN AUTO: 29.5 PG (ref 27–33)
MCHC RBC AUTO-ENTMCNC: 32.8 G/DL (ref 33.6–35)
MCV RBC AUTO: 89.9 FL (ref 81.4–97.8)
MONOCYTES # BLD AUTO: 0.73 K/UL (ref 0–0.85)
MONOCYTES NFR BLD AUTO: 7.2 % (ref 0–13.4)
NEUTROPHILS # BLD AUTO: 8.63 K/UL (ref 2–7.15)
NEUTROPHILS NFR BLD: 85.6 % (ref 44–72)
NEUTROPHILS NFR FLD: 100 %
NRBC # BLD AUTO: 0 K/UL
NRBC BLD-RTO: 0 /100 WBC
PATHOLOGY CONSULT NOTE: NORMAL
PLATELET # BLD AUTO: 292 K/UL (ref 164–446)
PMV BLD AUTO: 9.9 FL (ref 9–12.9)
POTASSIUM SERPL-SCNC: 3.9 MMOL/L (ref 3.6–5.5)
PROTHROMBIN TIME: 13.3 SEC (ref 12–14.6)
RBC # BLD AUTO: 4.65 M/UL (ref 4.2–5.4)
SODIUM SERPL-SCNC: 140 MMOL/L (ref 135–145)
WBC # BLD AUTO: 10.1 K/UL (ref 4.8–10.8)
WBC # FLD: NORMAL CELLS/UL

## 2019-04-23 PROCEDURE — 71045 X-RAY EXAM CHEST 1 VIEW: CPT

## 2019-04-23 PROCEDURE — 89051 BODY FLUID CELL COUNT: CPT

## 2019-04-23 PROCEDURE — 85610 PROTHROMBIN TIME: CPT

## 2019-04-23 PROCEDURE — 0BBC8ZX EXCISION OF RIGHT UPPER LUNG LOBE, VIA NATURAL OR ARTIFICIAL OPENING ENDOSCOPIC, DIAGNOSTIC: ICD-10-PCS | Performed by: INTERNAL MEDICINE

## 2019-04-23 PROCEDURE — 0B9C8ZX DRAINAGE OF RIGHT UPPER LUNG LOBE, VIA NATURAL OR ARTIFICIAL OPENING ENDOSCOPIC, DIAGNOSTIC: ICD-10-PCS | Performed by: INTERNAL MEDICINE

## 2019-04-23 PROCEDURE — 160035 HCHG PACU - 1ST 60 MINS PHASE I: Performed by: INTERNAL MEDICINE

## 2019-04-23 PROCEDURE — 87070 CULTURE OTHR SPECIMN AEROBIC: CPT

## 2019-04-23 PROCEDURE — 87106 FUNGI IDENTIFICATION YEAST: CPT

## 2019-04-23 PROCEDURE — 94640 AIRWAY INHALATION TREATMENT: CPT

## 2019-04-23 PROCEDURE — 31628 BRONCHOSCOPY/LUNG BX EACH: CPT | Performed by: INTERNAL MEDICINE

## 2019-04-23 PROCEDURE — 700102 HCHG RX REV CODE 250 W/ 637 OVERRIDE(OP): Performed by: HOSPITALIST

## 2019-04-23 PROCEDURE — 94760 N-INVAS EAR/PLS OXIMETRY 1: CPT

## 2019-04-23 PROCEDURE — A9270 NON-COVERED ITEM OR SERVICE: HCPCS | Performed by: HOSPITALIST

## 2019-04-23 PROCEDURE — 770006 HCHG ROOM/CARE - MED/SURG/GYN SEMI*

## 2019-04-23 PROCEDURE — 88305 TISSUE EXAM BY PATHOLOGIST: CPT | Mod: 59

## 2019-04-23 PROCEDURE — 700111 HCHG RX REV CODE 636 W/ 250 OVERRIDE (IP): Performed by: INTERNAL MEDICINE

## 2019-04-23 PROCEDURE — 88312 SPECIAL STAINS GROUP 1: CPT | Mod: 59

## 2019-04-23 PROCEDURE — 88104 CYTOPATH FL NONGYN SMEARS: CPT

## 2019-04-23 PROCEDURE — 0BC68ZZ EXTIRPATION OF MATTER FROM RIGHT LOWER LOBE BRONCHUS, VIA NATURAL OR ARTIFICIAL OPENING ENDOSCOPIC: ICD-10-PCS | Performed by: INTERNAL MEDICINE

## 2019-04-23 PROCEDURE — A9270 NON-COVERED ITEM OR SERVICE: HCPCS | Performed by: INTERNAL MEDICINE

## 2019-04-23 PROCEDURE — 31624 DX BRONCHOSCOPE/LAVAGE: CPT | Mod: 51 | Performed by: INTERNAL MEDICINE

## 2019-04-23 PROCEDURE — 700101 HCHG RX REV CODE 250

## 2019-04-23 PROCEDURE — 700105 HCHG RX REV CODE 258: Performed by: ANESTHESIOLOGY

## 2019-04-23 PROCEDURE — 99232 SBSQ HOSP IP/OBS MODERATE 35: CPT | Performed by: INTERNAL MEDICINE

## 2019-04-23 PROCEDURE — 160029 HCHG SURGERY MINUTES - 1ST 30 MINS LEVEL 4: Performed by: INTERNAL MEDICINE

## 2019-04-23 PROCEDURE — 87206 SMEAR FLUORESCENT/ACID STAI: CPT

## 2019-04-23 PROCEDURE — 0B998ZX DRAINAGE OF LINGULA BRONCHUS, VIA NATURAL OR ARTIFICIAL OPENING ENDOSCOPIC, DIAGNOSTIC: ICD-10-PCS | Performed by: INTERNAL MEDICINE

## 2019-04-23 PROCEDURE — 0BD48ZX EXTRACTION OF RIGHT UPPER LOBE BRONCHUS, VIA NATURAL OR ARTIFICIAL OPENING ENDOSCOPIC, DIAGNOSTIC: ICD-10-PCS | Performed by: INTERNAL MEDICINE

## 2019-04-23 PROCEDURE — 700111 HCHG RX REV CODE 636 W/ 250 OVERRIDE (IP): Performed by: ANESTHESIOLOGY

## 2019-04-23 PROCEDURE — 87102 FUNGUS ISOLATION CULTURE: CPT

## 2019-04-23 PROCEDURE — 700101 HCHG RX REV CODE 250: Performed by: ANESTHESIOLOGY

## 2019-04-23 PROCEDURE — 700105 HCHG RX REV CODE 258: Performed by: INTERNAL MEDICINE

## 2019-04-23 PROCEDURE — 87015 SPECIMEN INFECT AGNT CONCNTJ: CPT

## 2019-04-23 PROCEDURE — 87389 HIV-1 AG W/HIV-1&-2 AB AG IA: CPT

## 2019-04-23 PROCEDURE — 700101 HCHG RX REV CODE 250: Performed by: HOSPITALIST

## 2019-04-23 PROCEDURE — 160009 HCHG ANES TIME/MIN: Performed by: INTERNAL MEDICINE

## 2019-04-23 PROCEDURE — 31645 BRNCHSC W/THER ASPIR 1ST: CPT | Mod: 51 | Performed by: INTERNAL MEDICINE

## 2019-04-23 PROCEDURE — 85025 COMPLETE CBC W/AUTO DIFF WBC: CPT

## 2019-04-23 PROCEDURE — 99232 SBSQ HOSP IP/OBS MODERATE 35: CPT | Mod: 25 | Performed by: INTERNAL MEDICINE

## 2019-04-23 PROCEDURE — 88112 CYTOPATH CELL ENHANCE TECH: CPT

## 2019-04-23 PROCEDURE — 0BCB8ZZ EXTIRPATION OF MATTER FROM LEFT LOWER LOBE BRONCHUS, VIA NATURAL OR ARTIFICIAL OPENING ENDOSCOPIC: ICD-10-PCS | Performed by: INTERNAL MEDICINE

## 2019-04-23 PROCEDURE — 87116 MYCOBACTERIA CULTURE: CPT

## 2019-04-23 PROCEDURE — 160048 HCHG OR STATISTICAL LEVEL 1-5: Performed by: INTERNAL MEDICINE

## 2019-04-23 PROCEDURE — 80048 BASIC METABOLIC PNL TOTAL CA: CPT

## 2019-04-23 PROCEDURE — 700102 HCHG RX REV CODE 250 W/ 637 OVERRIDE(OP): Performed by: INTERNAL MEDICINE

## 2019-04-23 PROCEDURE — 87205 SMEAR GRAM STAIN: CPT

## 2019-04-23 PROCEDURE — 302978 HCHG BRONCHOSCOPY-DIAGNOSTIC

## 2019-04-23 PROCEDURE — 160002 HCHG RECOVERY MINUTES (STAT): Performed by: INTERNAL MEDICINE

## 2019-04-23 PROCEDURE — 160041 HCHG SURGERY MINUTES - EA ADDL 1 MIN LEVEL 4: Performed by: INTERNAL MEDICINE

## 2019-04-23 RX ORDER — SODIUM CHLORIDE, SODIUM LACTATE, POTASSIUM CHLORIDE, CALCIUM CHLORIDE 600; 310; 30; 20 MG/100ML; MG/100ML; MG/100ML; MG/100ML
INJECTION, SOLUTION INTRAVENOUS
Status: CANCELLED | OUTPATIENT
Start: 2019-04-23

## 2019-04-23 RX ORDER — SODIUM CHLORIDE, SODIUM LACTATE, POTASSIUM CHLORIDE, CALCIUM CHLORIDE 600; 310; 30; 20 MG/100ML; MG/100ML; MG/100ML; MG/100ML
INJECTION, SOLUTION INTRAVENOUS CONTINUOUS
Status: DISCONTINUED | OUTPATIENT
Start: 2019-04-23 | End: 2019-04-23 | Stop reason: HOSPADM

## 2019-04-23 RX ORDER — DIPHENHYDRAMINE HYDROCHLORIDE 50 MG/ML
12.5 INJECTION INTRAMUSCULAR; INTRAVENOUS
Status: DISCONTINUED | OUTPATIENT
Start: 2019-04-23 | End: 2019-04-23 | Stop reason: HOSPADM

## 2019-04-23 RX ORDER — HYDROMORPHONE HYDROCHLORIDE 2 MG/ML
0.2 INJECTION, SOLUTION INTRAMUSCULAR; INTRAVENOUS; SUBCUTANEOUS
Status: DISCONTINUED | OUTPATIENT
Start: 2019-04-23 | End: 2019-04-23 | Stop reason: HOSPADM

## 2019-04-23 RX ORDER — SODIUM CHLORIDE, SODIUM LACTATE, POTASSIUM CHLORIDE, CALCIUM CHLORIDE 600; 310; 30; 20 MG/100ML; MG/100ML; MG/100ML; MG/100ML
INJECTION, SOLUTION INTRAVENOUS
Status: DISCONTINUED | OUTPATIENT
Start: 2019-04-23 | End: 2019-04-23 | Stop reason: SURG

## 2019-04-23 RX ORDER — HYDROMORPHONE HYDROCHLORIDE 2 MG/ML
0.4 INJECTION, SOLUTION INTRAMUSCULAR; INTRAVENOUS; SUBCUTANEOUS
Status: DISCONTINUED | OUTPATIENT
Start: 2019-04-23 | End: 2019-04-23 | Stop reason: HOSPADM

## 2019-04-23 RX ORDER — OXYCODONE HCL 5 MG/5 ML
10 SOLUTION, ORAL ORAL
Status: DISCONTINUED | OUTPATIENT
Start: 2019-04-23 | End: 2019-04-23 | Stop reason: HOSPADM

## 2019-04-23 RX ORDER — HALOPERIDOL 5 MG/ML
1 INJECTION INTRAMUSCULAR
Status: DISCONTINUED | OUTPATIENT
Start: 2019-04-23 | End: 2019-04-23 | Stop reason: HOSPADM

## 2019-04-23 RX ORDER — ONDANSETRON 2 MG/ML
4 INJECTION INTRAMUSCULAR; INTRAVENOUS
Status: DISCONTINUED | OUTPATIENT
Start: 2019-04-23 | End: 2019-04-23 | Stop reason: HOSPADM

## 2019-04-23 RX ORDER — HYDROMORPHONE HYDROCHLORIDE 2 MG/ML
0.1 INJECTION, SOLUTION INTRAMUSCULAR; INTRAVENOUS; SUBCUTANEOUS
Status: DISCONTINUED | OUTPATIENT
Start: 2019-04-23 | End: 2019-04-23 | Stop reason: HOSPADM

## 2019-04-23 RX ORDER — MEPERIDINE HYDROCHLORIDE 25 MG/ML
12.5 INJECTION INTRAMUSCULAR; INTRAVENOUS; SUBCUTANEOUS
Status: DISCONTINUED | OUTPATIENT
Start: 2019-04-23 | End: 2019-04-23 | Stop reason: HOSPADM

## 2019-04-23 RX ORDER — OXYCODONE HCL 5 MG/5 ML
5 SOLUTION, ORAL ORAL
Status: DISCONTINUED | OUTPATIENT
Start: 2019-04-23 | End: 2019-04-23 | Stop reason: HOSPADM

## 2019-04-23 RX ADMIN — PROPOFOL 100 MG: 10 INJECTION, EMULSION INTRAVENOUS at 11:32

## 2019-04-23 RX ADMIN — IPRATROPIUM BROMIDE AND ALBUTEROL SULFATE 3 ML: .5; 3 SOLUTION RESPIRATORY (INHALATION) at 06:39

## 2019-04-23 RX ADMIN — FLUTICASONE PROPIONATE 220 MCG: 110 AEROSOL, METERED RESPIRATORY (INHALATION) at 21:57

## 2019-04-23 RX ADMIN — PROPOFOL 100 MG: 10 INJECTION, EMULSION INTRAVENOUS at 11:50

## 2019-04-23 RX ADMIN — IPRATROPIUM BROMIDE AND ALBUTEROL SULFATE 3 ML: .5; 3 SOLUTION RESPIRATORY (INHALATION) at 14:50

## 2019-04-23 RX ADMIN — IPRATROPIUM BROMIDE AND ALBUTEROL SULFATE 3 ML: .5; 3 SOLUTION RESPIRATORY (INHALATION) at 21:53

## 2019-04-23 RX ADMIN — CEFTRIAXONE SODIUM 2 G: 2 INJECTION, POWDER, FOR SOLUTION INTRAMUSCULAR; INTRAVENOUS at 05:29

## 2019-04-23 RX ADMIN — AZITHROMYCIN 250 MG: 250 TABLET, FILM COATED ORAL at 05:28

## 2019-04-23 RX ADMIN — LEVOTHYROXINE SODIUM 88 MCG: 88 TABLET ORAL at 05:28

## 2019-04-23 RX ADMIN — FLUTICASONE PROPIONATE 220 MCG: 110 AEROSOL, METERED RESPIRATORY (INHALATION) at 06:39

## 2019-04-23 RX ADMIN — PREDNISONE 40 MG: 20 TABLET ORAL at 05:28

## 2019-04-23 RX ADMIN — MIDAZOLAM HYDROCHLORIDE 2 MG: 1 INJECTION, SOLUTION INTRAMUSCULAR; INTRAVENOUS at 11:30

## 2019-04-23 RX ADMIN — FENTANYL CITRATE 50 MCG: 50 INJECTION, SOLUTION INTRAMUSCULAR; INTRAVENOUS at 11:30

## 2019-04-23 RX ADMIN — FENTANYL CITRATE 50 MCG: 50 INJECTION, SOLUTION INTRAMUSCULAR; INTRAVENOUS at 11:33

## 2019-04-23 RX ADMIN — SODIUM CHLORIDE, POTASSIUM CHLORIDE, SODIUM LACTATE AND CALCIUM CHLORIDE: 600; 310; 30; 20 INJECTION, SOLUTION INTRAVENOUS at 11:31

## 2019-04-23 ASSESSMENT — ENCOUNTER SYMPTOMS
SHORTNESS OF BREATH: 1
WHEEZING: 0
WEAKNESS: 0
DIARRHEA: 0
ORTHOPNEA: 0
SPUTUM PRODUCTION: 1
SINUS PAIN: 0
ABDOMINAL PAIN: 0
NAUSEA: 0
STRIDOR: 0
COUGH: 1
FEVER: 0
HEMOPTYSIS: 0
PALPITATIONS: 0
DEPRESSION: 0
HEADACHES: 0
CLAUDICATION: 0
FOCAL WEAKNESS: 0
MYALGIAS: 0
DIZZINESS: 0
CHILLS: 0
BLOOD IN STOOL: 0
SORE THROAT: 0
VOMITING: 0
BACK PAIN: 0
HALLUCINATIONS: 0
HEARTBURN: 0

## 2019-04-23 ASSESSMENT — PAIN SCALES - GENERAL: PAIN_LEVEL: 2

## 2019-04-23 NOTE — CARE PLAN
Problem: Oxygenation:  Goal: Maintain adequate oxygenation dependent on patient condition  Outcome: PROGRESSING AS EXPECTED  Currently, Fi02 5LPM nasal cannula.  Sp02's have been >92%  Intervention: Levels of oxygenation will improve to baseline  Patient does not use oxygen at home      Problem: Bronchoconstriction:  Goal: Improve in air movement and diminished wheezing  Outcome: PROGRESSING AS EXPECTED  Patient feels QID duoneb and BID flovent improves air movement, B/S- less diminished following therapy  Intervention: Evaluate and manage medication effects  Patient is observed prior and after therapy and thus far patient does feel improvement

## 2019-04-23 NOTE — ANESTHESIA PREPROCEDURE EVALUATION
Relevant Problems   No relevant active problems       Physical Exam    Airway   Mallampati: II  TM distance: >3 FB  Neck ROM: full       Cardiovascular - normal exam  Rhythm: regular  Rate: normal  (-) murmur     Dental - normal exam         Pulmonary - normal exam  Breath sounds clear to auscultation  (+) decreased breath sounds     Abdominal   Abdomen: soft  Bowel sounds: normal   Neurological - normal exam                 Anesthesia Plan    ASA 3   ASA physical status 3 criteria: respiratory insufficiency or compromise    Plan - MAC                 Postoperative Plan: Postoperative administration of opioids is intended.        Informed Consent:

## 2019-04-23 NOTE — ANESTHESIA TIME REPORT
Anesthesia Start and Stop Event Times     Date Time Event    4/23/2019 1131 Anesthesia Start     1207 Anesthesia Stop        Responsible Staff  04/23/19    Name Role Begin End    Luis Reyes M.D. Anesth 1131 1207        Preop Diagnosis (Free Text):  Pre-op Diagnosis     shortness of breath        Preop Diagnosis (Codes):  Diagnosis Information     Diagnosis Code(s): Shortness of breath [R06.02]        Post op Diagnosis  Status post bronchoscopy with bronchoalveolar lavage      Premium Reason  Non-Premium    Comments:

## 2019-04-23 NOTE — CARE PLAN
Problem: Communication  Goal: The ability to communicate needs accurately and effectively will improve  Outcome: PROGRESSING AS EXPECTED    Intervention: [place] patient and significant other/support system to call light to alert staff of needs  Patient oriented to call light system and all relevant hospital policies. Call light within reach and used appropriately when in need of assistance.        Problem: Knowledge Deficit  Goal: Knowledge of the prescribed therapeutic regimen will improve  Outcome: PROGRESSING AS EXPECTED    Intervention: Discuss information regarding therpeutic regimen and document in education  Patient aware of plan of care and upcoming bronchoscopy today.

## 2019-04-23 NOTE — FLOWSHEET NOTE
04/23/19 0642   Interdisciplinary Plan of Care-Goals (Indications)   Bronchodilator Indications History / Diagnosis   Interdisciplinary Plan of Care-Outcomes    Bronchodilator Outcome Patient at Stable Baseline   Education   Education Yes - Pt. / Family has been Instructed in use of Respiratory Equipment;Yes - Pt. / Family has been Instructed in use of Respiratory Medications and Adverse Reactions   RT Assessment of Delivered Medications   Evaluation of Medication Delivery Daily Yes-- Pt /Family has been Instructed in use of Respiratory Medications and Adverse Reactions   SVN Group   #SVN Performed Yes   Given By: Mouthpiece   MDI/DPI Group   #MDI/DPI Given MDI/DPI x 1   Chest Exam   Respiration 20   Heart Rate (Monitored) 60   Breath Sounds   Pre/Post Intervention Pre Intervention Assessment   RUL Breath Sounds Diminished;Clear   RML Breath Sounds Diminished;Clear   RLL Breath Sounds Diminished;Clear   BECCA Breath Sounds Fine Crackles;Diminished   LLL Breath Sounds Fine Crackles;Diminished   Oximetry   #Pulse Oximetry (Single Determination) Yes   Oxygen   Home O2 Use Prior To Admission? No   Pulse Oximetry 92 %   O2 (LPM) 5   O2 Daily Delivery Respiratory  Silicone Nasal Cannula

## 2019-04-23 NOTE — PROGRESS NOTES
0700 Received report from night shift RN Kriss. POC discussed. Patient resting comfortably in bed at this time with no complaints. Safety precautions and hourly rounding in place.    0830 Assessment complete, patient aware of bronchoscopy today at 1100. NPO at this time.

## 2019-04-23 NOTE — PROGRESS NOTES
[facility] Daily Progress Note    Date of Service  4/23/2019    Chief Complaint  58 y.o. female admitted 4/19/2019 with shortness of breath    Hospital Course    History of pneumonia, hypothyroidism, asthma diagnosis admitted with increasing shortness of breath and cough after traveling, found to have sepsis and acute respiratory failure with hypoxia due to bilateral pneumonia.      Interval Problem Update  4/23: Transferred out of the ICU, bronchoscopy showed severe bilateral pneumonia with significant inflammation s/p bx and BAL.  3L NC    Consultants/Specialty  Dr. Velasquez    Code Status  Full    Disposition  F/U Biopsy results, wean o2  Needs nebulizer on DC, poss home O2 if unable to wean    Review of Systems  Review of Systems   Constitutional: Negative for chills, fever and malaise/fatigue.   HENT: Negative for sore throat.    Respiratory: Positive for cough and shortness of breath.    Cardiovascular: Negative for chest pain and palpitations.   Gastrointestinal: Negative for abdominal pain, blood in stool, diarrhea, heartburn, nausea and vomiting.   Genitourinary: Negative for dysuria and frequency.   Musculoskeletal: Negative for back pain and myalgias.   Neurological: Negative for dizziness, focal weakness, weakness and headaches.   Psychiatric/Behavioral: Negative for depression and hallucinations.   All other systems reviewed and are negative.       Physical Exam  Temp:  [36.6 °C (97.8 °F)-37.3 °C (99.1 °F)] 36.8 °C (98.3 °F)  Pulse:  [53-75] 75  Resp:  [16-20] 20  BP: (125-144)/(60-78) 126/60  SpO2:  [82 %-96 %] 94 %    Physical Exam   Constitutional: She is oriented to person, place, and time. She appears well-developed and well-nourished. No distress.   HENT:   Head: Normocephalic.   Mouth/Throat: No oropharyngeal exudate.   Eyes: Pupils are equal, round, and reactive to light. No scleral icterus.   Neck: No JVD present. No thyromegaly present.   Cardiovascular: Normal rate and regular rhythm.     No murmur heard.  Pulmonary/Chest: Effort normal. No respiratory distress. She has wheezes. She has no rales. She exhibits no tenderness.   Abdominal: Soft. Bowel sounds are normal. She exhibits no distension. There is no tenderness.   Musculoskeletal: Normal range of motion. She exhibits no edema or tenderness.   Neurological: She is alert and oriented to person, place, and time. No cranial nerve deficit. Coordination normal.   Skin: Skin is warm and dry. No rash noted. No erythema.   Psychiatric: She has a normal mood and affect. Her behavior is normal.       Fluids    Intake/Output Summary (Last 24 hours) at 04/23/19 1546  Last data filed at 04/23/19 1235   Gross per 24 hour   Intake             1820 ml   Output                0 ml   Net             1820 ml       Laboratory  Recent Labs      04/21/19   0315  04/22/19   0240  04/23/19   0317   WBC  22.5*  14.9*  10.1   RBC  4.31  4.57  4.65   HEMOGLOBIN  12.7  13.4  13.7   HEMATOCRIT  39.5  41.5  41.8   MCV  91.6  90.8  89.9   MCH  29.5  29.3  29.5   MCHC  32.2*  32.3*  32.8*   RDW  46.9  45.8  44.7   PLATELETCT  245  258  292   MPV  10.3  9.8  9.9     Recent Labs      04/21/19   1000  04/23/19   0317   SODIUM  140  140   POTASSIUM  4.6  3.9   CHLORIDE  108  104   CO2  22  28   GLUCOSE  263*  122*   BUN  16  13   CREATININE  0.56  0.51   CALCIUM  9.4  8.8     Recent Labs      04/23/19   0317   INR  1.02     Recent Labs      04/21/19   1000   BNPBTYPENAT  360*           Imaging  DX-PORTABLE FLUOROSCOPY < 1 HOUR Is the patient pregnant? No   Final Result         Portable fluoroscopy utilized for 14.2 seconds.         DX-CHEST-PORTABLE (1 VIEW)   Final Result      Stable chest with no pneumothorax identified following right lung biopsy      DX-CHEST-PORTABLE (1 VIEW)   Final Result      Decreased airspace disease bilaterally      EC-ECHOCARDIOGRAM COMPLETE W/O CONT   Final Result      DX-CHEST-PORTABLE (1 VIEW)   Final Result         1.  Pulmonary edema and/or  infiltrates.      CT-CTA CHEST PULMONARY ARTERY W/ RECONS   Final Result         1.  No pulmonary embolus appreciated.   2.  Extensive groundglass pulmonary opacities, consider with edema and/or atypical infiltrates.   3.  Hepatomegaly and diffuse hepatic steatosis      DX-CHEST-PORTABLE (1 VIEW)   Final Result         1.  Pulmonary edema and/or infiltrates are identified, which are stable since the prior exam.           Assessment/Plan  Demand ischemia (HCC)   Assessment & Plan    -Initial troponin level 0.14 with a subsequent 1 of 0.15.  She was ruled out for PE with negative CTA  -Echo on 4/20 was normal     Lactic acid acidosis   Assessment & Plan    Due to sepsis secondary to pneumonia     Acute respiratory failure with hypoxia (HCC)   Assessment & Plan    Required ICU admission, secondary to bilateral pneumonia, elevated pro-calcitonin  -Has completed 5 days of ceftriaxone and azithromycin therapy  -Imaging suggests possible underlying ILD, status post bronchoscopy 4/23 with biopsy taken  -She is now on 3L nasal cannula.  -Continue prednisone 40 mg daily     Moderate persistent asthma   Assessment & Plan    Needs nebulizer for home  Needs to re-establish with pulmonology as outpatient on DC (has been seen by [place]'s in the past)     Atypical pneumonia   Assessment & Plan    -Chest x-ray showing pulmonary edema and/or infiltrates.  -Completed 5d abx  -Question of ILD  -Bronch 4/23- showed severe bilateral PNA with inflammation s/p Diagnostic bronch w BAL & brush, therapeutic aspiration of mucus plugs LLL and RLL Transbronchial biopsy of RUL  -Continue prednisone 40  -Finished abx          VTE prophylaxis: Lovenox

## 2019-04-23 NOTE — CARE PLAN
Problem: Respiratory:  Goal: Respiratory status will improve  Outcome: PROGRESSING AS EXPECTED  IS at bedside, pt performing correctly every hour while awake, cough and deep breathing exercises done    Problem: Knowledge Deficit  Goal: Knowledge of disease process/condition, treatment plan, diagnostic tests, and medications will improve  Outcome: PROGRESSING AS EXPECTED  Pt educated on importance of breathing exercises and why she needs to perform them

## 2019-04-23 NOTE — FLOWSHEET NOTE
04/23/19 1628   Oximetry   #Pulse Oximetry (Single Determination) Yes   Oxygen   Pulse Oximetry 94 %   O2 (LPM) 3  (titrated to 2 lpm.)   O2 Daily Delivery Respiratory  Silicone Nasal Cannula

## 2019-04-23 NOTE — RESPIRATORY CARE
"COPD EDUCATION by COPD CLINICAL EDUCATOR  4/23/2019  at  4:14 PM by Millie Benedict     Patient interviewed by COPD education team.  Patient unable to participate in full program.  Short intervention was completed with this patient covering: What is Asthma (how the lungs work), asthma medications, controlling asthma, asthma action plan, peak flow meter and understanding asthma triggers were covered in detail.  A comprehensive packet including information about asthma and treatments.    A personalized \"Asthma Action Plan\" was reviewed with and provided to the patient. Also requested patient have home nebulizer, patient educated on home oxygen use and safety, and appointment with [facility] Pulmonologist.     "

## 2019-04-23 NOTE — FLOWSHEET NOTE
04/22/19 1948   Events/Summary/Plan   Events/Summary/Plan SVN with MP f/w Flovent  pulled new as was in med select as new but a secondary appeared in med bid after therapy   Interdisciplinary Plan of Care-Goals (Indications)   Bronchodilator Indications History / Diagnosis   Interdisciplinary Plan of Care-Outcomes    Bronchodilator Outcome Patient at Stable Baseline   Education   Education Yes - Pt. / Family has been Instructed in use of Respiratory Medications and Adverse Reactions   RT Assessment of Delivered Medications   Evaluation of Medication Delivery Daily Yes-- Pt /Family has been Instructed in use of Respiratory Medications and Adverse Reactions   SVN Group   #SVN Performed Yes   Given By: Mouthpiece   Date SVN Last Changed 04/19/19   Date SVN Next Change Due (Q 7 Days) 04/26/19   MDI/DPI Group   #MDI/DPI Given MDI/DPI x 1   Chest Exam   Work Of Breathing / Effort Mild;Shallow   Respiration 20   Pulse 75   Breath Sounds   RUL Breath Sounds Diminished   RML Breath Sounds Diminished   RLL Breath Sounds Diminished   BECCA Breath Sounds Diminished   LLL Breath Sounds Diminished   Secretions   Cough Moist;Strong   How Sputum Obtained Spontaneous   Oximetry   #Pulse Oximetry (Single Determination) Yes   Oxygen   Home O2 Use Prior To Admission? No   Pulse Oximetry 93 %   O2 (LPM) 5   O2 Daily Delivery Respiratory  Silicone Nasal Cannula

## 2019-04-23 NOTE — FLOWSHEET NOTE
04/23/19 1200   Bronchoscopy Procedure   Bronchoscopy Procedure Yes   Order placed in Epic? Yes   Pre-Op Teaching Yes   Consent Signed Yes   Time Out Performed Yes   Medication Allergy Reviewed Yes   #Diagnostic Procedure  Yes   Start Time 1138   End Time 1200   Performing Physician abiodun Chawla Serial Number [id number]   Post Procedure Response Taken to PACU for recovery.

## 2019-04-23 NOTE — PROGRESS NOTES
Pulmonary Progress Note    Date of admission  4/19/2019    Chief Complaint  58 y.o. female admitted 4/19/2019 with dry cough and dyspnea x2 days      Hospital Course  4/19 admitted with severe dyspnea and dry cough as well as fever x2 days  CT chest showed BL pulm edema   4/19-20 required HFNC 50 LPM in ICU   Echo EF 65% no diastolic dysfunction or valve disease  PCT was 4.14 then down to 1.67 with rocephin + azithromycin   4/22 down to 5 LPM with significant symptomatic improvement         Interval Problem Update  Reviewed last 24 hour events:  O2 requirement down to 3 LPM   Symptomatically improving   Afebrile x24 h  -140s     Review of Systems  Review of Systems   Constitutional: Negative for chills and fever.   HENT: Negative for sinus pain and sore throat.    Respiratory: Positive for cough, sputum production and shortness of breath. Negative for hemoptysis, wheezing and stridor.    Cardiovascular: Negative for chest pain, palpitations, orthopnea and claudication.   Skin: Negative for itching and rash.        Vital Signs for last 24 hours   Temp:  [36.6 °C (97.8 °F)-36.9 °C (98.5 °F)] 36.9 °C (98.4 °F)  Pulse:  [55-89] 60  Resp:  [16-30] 18  BP: (140-142)/(71-72) 140/71  SpO2:  [89 %-94 %] 94 %      Physical Exam   Physical Exam   Constitutional: She is oriented to person, place, and time. No distress.   HENT:   Head: Normocephalic and atraumatic.   Eyes: Right eye exhibits no discharge. Left eye exhibits no discharge. No scleral icterus.   Neck: No tracheal deviation present. No thyromegaly present.   Cardiovascular: Normal rate, regular rhythm, normal heart sounds and intact distal pulses.  Exam reveals no gallop and no friction rub.    No murmur heard.  Pulmonary/Chest: Effort normal. No stridor. No respiratory distress. She has no wheezes. She has rales. She exhibits no tenderness.   Abdominal: Soft. She exhibits no distension. There is no tenderness.   Musculoskeletal: She exhibits no edema,  tenderness or deformity.   Lymphadenopathy:     She has no cervical adenopathy.   Neurological: She is alert and oriented to person, place, and time.   Skin: Skin is warm. No rash noted. She is not diaphoretic. No erythema. No pallor.   Psychiatric: She has a normal mood and affect. Her behavior is normal. Judgment and thought content normal.   Nursing note and vitals reviewed.      Medications  Current Facility-Administered Medications   Medication Dose Route Frequency Provider Last Rate Last Dose   • fluticasone (FLOVENT HFA) 110 MCG/ACT inhaler 220 mcg  2 Puff Inhalation Q12HRS (RT) Holly Velasquez M.D.   220 mcg at 04/22/19 1948   • predniSONE (DELTASONE) tablet 40 mg  40 mg Oral DAILY Holly Velasquez M.D.   40 mg at 04/23/19 0528   • levothyroxine (SYNTHROID) tablet 88 mcg  88 mcg Oral AM ES Rosalina Amaya M.D.   88 mcg at 04/23/19 0528   • senna-docusate (PERICOLACE or SENOKOT S) 8.6-50 MG per tablet 2 Tab  2 Tab Oral BID Rosalina Amaya M.D.   Stopped at 04/20/19 0600    And   • polyethylene glycol/lytes (MIRALAX) PACKET 1 Packet  1 Packet Oral QDAY PRN Rosalina Amaya M.D.        And   • magnesium hydroxide (MILK OF MAGNESIA) suspension 30 mL  30 mL Oral QDAY PRN Rosalina Amaya M.D.        And   • bisacodyl (DULCOLAX) suppository 10 mg  10 mg Rectal QDAY PRN Rosalina Amaya M.D.       • Respiratory Care per Protocol   Nebulization Continuous RT Rosalina Amaya M.D.       • acetaminophen (TYLENOL) tablet 650 mg  650 mg Oral Q6HRS PRN Rosalina Amaya M.D.   650 mg at 04/19/19 1209   • guaiFENesin dextromethorphan (ROBITUSSIN DM) 100-10 MG/5ML syrup 10 mL  10 mL Oral Q6HRS PRN Rosalina Amaya M.D.   10 mL at 04/21/19 2107   • ipratropium-albuterol (DUONEB) nebulizer solution  3 mL Nebulization 4X/DAY (RT) Rosalina Amaya M.D.   3 mL at 04/22/19 1948   • albuterol (PROVENTIL) 2.5mg/0.5ml nebulizer solution 2.5 mg  2.5 mg Nebulization RT EVERY  "1 HOUR PRN Rosalina Amaya M.D.       • benzocaine-menthol (CEPACOL) lozenge 1 Lozenge  1 Lozenge Mouth/Throat Q2HRS PRN Rosa Elena Richard M.D.   1 Lozenge at 04/19/19 1841   • benzonatate (TESSALON) capsule 100 mg  100 mg Oral TID PRN Rosa Elena Richard M.D.   100 mg at 04/21/19 2107   • enoxaparin (LOVENOX) inj 40 mg  40 mg Subcutaneous DAILY Rosa Elena Richard M.D.   40 mg at 04/22/19 0621       Fluids    Intake/Output Summary (Last 24 hours) at 04/23/19 0620  Last data filed at 04/22/19 1400   Gross per 24 hour   Intake              640 ml   Output             1750 ml   Net            -1110 ml       Laboratory      Recent Labs      04/21/19   1000   BNPBTYPENAT  360*     Recent Labs      04/21/19   1000  04/23/19   0317   SODIUM  140  140   POTASSIUM  4.6  3.9   CHLORIDE  108  104   CO2  22  28   BUN  16  13   CREATININE  0.56  0.51   CALCIUM  9.4  8.8     Recent Labs      04/21/19   1000  04/23/19   0317   GLUCOSE  263*  122*     Recent Labs      04/21/19   0315  04/22/19   0240  04/23/19   0317   WBC  22.5*  14.9*  10.1   NEUTSPOLYS  95.10*  94.90*  85.60*   LYMPHOCYTES  1.40*  1.80*  5.80*   MONOCYTES  1.80  3.00  7.20   EOSINOPHILS  0.00  0.00  0.20   BASOPHILS  0.10  0.10  0.20     Recent Labs      04/21/19   0315  04/22/19   0240  04/23/19   0317   RBC  4.31  4.57  4.65   HEMOGLOBIN  12.7  13.4  13.7   HEMATOCRIT  39.5  41.5  41.8   PLATELETCT  245  258  292   PROTHROMBTM   --    --   13.3   INR   --    --   1.02       Imaging  X-Ray:  I have personally reviewed the images and compared with prior images.    Assessment/Plan  Acute hypoxemic respiratory failure (HCC)   Assessment & Plan    Improving, now down to 3 LPM   Titrate oxygen for SpO2 >= 88%         Moderate persistent asthma   Assessment & Plan    On Arnuity at home   Change symbicort to flovent \"same as arnuity- fluticasone\"  prednisone 40 mg po daily x6 days then stop        Atypical pneumonia   Assessment & Plan    Received CAP Coverage with " "Ceftriaxone and Azithromycin   Today is last day for both \"5 days course\"  CXR post-procedure pending   Given bronchoscopy findings will extend Abx to 10 days      Hyperglycemia   Assessment & Plan    Should improve as I changed her to prednsione 40 po daily yesterday   Continue to monitor      Leukocytosis (leucocytosis)   Assessment & Plan    Likely from steroids, down to 14.9 today   Continue to trend daily CBC        I have performed a physical exam and reviewed and updated ROS and Plan today (4/23/2019). In review of yesterday's note (4/22/2019), there are no changes except as documented above.       "

## 2019-04-23 NOTE — PROGRESS NOTES
Patient back to unit from [facility].  at bedside. Okay with Dr. Velasquez to have diet orders, regular/cardiac diet ordered. Food provided. Patient set up with post op vitals. No complaints of pain or discomfort.

## 2019-04-23 NOTE — OR NURSING
1205: To PACU from procedure room via gurney, respirations spontaneous and non-labored. Non-productive coughing, blowing nose of blood tinged secretions. O2 commenced.  1220: Coughing decreased, pt denies pain/nausea and dyspnea.  1235: Meets criteria to transfer to room

## 2019-04-23 NOTE — OR NURSING
1004 Pt. Allergies and NPO status verified, home medications reconciled. Belongings secured. Pt. Verbalizes understanding of pain scale, expected course of stay and plan of care. Surgical site verified with pt. Pt. And family given home care instructions for discharge planning. IV access established. Sequentials placed on legs.

## 2019-04-23 NOTE — ASSESSMENT & PLAN NOTE
Needs nebulizer for home  Needs to re-establish with pulmonology as outpatient on DC (has been seen by [facility] in the past)

## 2019-04-23 NOTE — DOCUMENTATION QUERY
[facility]                                                                       Query Response Note      PATIENT:               GINNA ROCHE  ACCT #:                  [id number]  MRN:                     [id number]  :                      1960  ADMIT DATE:       2019 3:06 AM  DISCH DATE:          RESPONDING  PROVIDER #:        [id number]           QUERY TEXT:    Acute distress is documented in the Medical Record. Please specify the type and acuity (includes suspected or probable)    NOTE:  If an appropriate response is not listed below, please respond with a new note.    The patient's Clinical Indicators include:  Pt admitted for sepsis/septic shock; source respiratory  -was cyanotic on admission w/O2 sat of 70%  -CXR: pulmonary edema and/or infiltrates are identified, which are stable since the prior exam  -initial lactic acid 2.3 then up to 4.3 on next draw  -initial ABG's: Po2 48.4 and O2 sat 82.7  -RR 26  -cyanosis and hypoxia are documented   -? PNA vs ARDS  -high risk for needing intubation    Treatment:  -high flow O2 @ 6 liters  -IV abx for the sepsis  -RT protocols   -IVF for lactic acidosis    Risk:  Pt is at risk for escalation of respiratory status and need for intubation.  Elevated trop demand ischemia at risk for MI.    Query created by: Edilma Malone on 2019 11:16 AM    RESPONSE TEXT:    Acute respiratory failure with hypoxia       QUERY TEXT:    Please provide additional clinical indicators supportive of your documented diagnosis of septic shock.    Please provide amended documentation in your next progress note as applicable.     NOTE: If an appropriate response is not listed below, please respond with a new note.    The patient's Clinical Indicators include:  Sepsis and septic shock are documented in the H&P  - shock is clinically identified by a vasopressor requirement to maintain a mean arterial pressure  (MAP) of 65 mmHG or greater AND serum lactate level greater than 2 mmol/L (>18mg/dl) in the absence of hypovolemia.    -No vasopressor was given as pt did not develop hypotension.  -IVF were given for the lactic acidosis    Treatment:  -IV abx for the sepsis and PNA  -high flow O2 for respiratory support   -ICU support care     Risk:  At risk for deterioration of cardiopulmonary system potentially requiring vasopressors and/or intubation and ventilator support with progression of sepsis.    Query created by: Edilma Malone on 4/22/2019 11:41 AM    RESPONSE TEXT:    Condition exists and additional clinical indicators documented in the medical record          Electronically signed by:  LION WHITFIELD MD 4/22/2019 7:25 PM

## 2019-04-23 NOTE — FLOWSHEET NOTE
04/23/19 2800   Interdisciplinary Plan of Care-Goals (Indications)   Bronchodilator Indications History / Diagnosis   Interdisciplinary Plan of Care-Outcomes    Bronchodilator Outcome Patient at Stable Baseline   RT Assessment of Delivered Medications   Evaluation of Medication Delivery Daily Yes-- Pt /Family has been Instructed in use of Respiratory Medications and Adverse Reactions   SVN Group   #SVN Performed Yes   Given By: Mouthpiece   Respiratory WDL   Respiratory (WDL) X   Chest Exam   Work Of Breathing / Effort Shallow   Respiration 20   Heart Rate (Monitored) 78   Breath Sounds   Pre/Post Intervention Pre Intervention Assessment   RUL Breath Sounds Diminished;Clear   RML Breath Sounds Diminished;Clear   RLL Breath Sounds Diminished   BECCA Breath Sounds Diminished;Fine Crackles   LLL Breath Sounds Diminished;Fine Crackles   Oximetry   #Pulse Oximetry (Single Determination) Yes   Oxygen   Pulse Oximetry 94 %   O2 (LPM) 4  (titrated to 3 lpm)   O2 Daily Delivery Respiratory  Silicone Nasal Cannula

## 2019-04-23 NOTE — ANESTHESIA QCDR
2019 Qualified Clinical Data Registry (for Quality Improvement)     Postoperative nausea/vomiting risk protocol (Adult = 18 yrs and Pediatric 3-17 yrs)- (430 and 463)  General inhalation anesthetic (NOT TIVA) with PONV risk factors: Yes  Provision of anti-emetic therapy with at least 2 different classes of agents: Yes   Patient DID NOT receive anti-emetic therapy and reason is documented in Medical Record:  N/A    Multimodal Pain Management- (AQI59)  Patient undergoing Elective Surgery (i.e. Outpatient, or ASC, or Prescheduled Surgery prior to Hospital Admission): No  Use of Multimodal Pain Management, two or more drugs and/or interventions, NOT including systemic opioids: N/A  Exception: Documented allergy to multiple classes of analgesics: N/A    PACU assessment of acute postoperative pain prior to Anesthesia Care End- Applies to Patients Age = 18- (ABG7)  Initial PACU pain score is which of the following: < 7/10  Patient unable to report pain score: N/A    Post-anesthetic transfer of care checklist/protocol to PACU/ICU- (426 and 427)  Upon conclusion of case, patient transferred to which of the following locations: PACU/Non-ICU  Use of transfer checklist/protocol: Yes  Exclusion: Service Performed in Patient Hospital Room (and thus did not require transfer): N/A    PACU Reintubation- (AQI31)  General anesthesia requiring endotracheal intubation (ETT) along with subsequent extubation in OR or PACU: No  Required reintubation in the PACU: N/A  Extubation was a planned trial documented in the medical record prior to removal of the original airway device: N/A    Unplanned admission to ICU related to anesthesia service up through end of PACU care- (MD51)  Unplanned admission to ICU (not initially anticipated at anesthesia start time): Yes

## 2019-04-23 NOTE — ANESTHESIA POSTPROCEDURE EVALUATION
Patient: Lillian Addison    Procedure Summary     Date:  04/23/19 Room / Location:   PROCEDURE ROOM / SURGERY [facility]    Anesthesia Start:  1131 Anesthesia Stop:  1207    Procedures:       BRONCHOSCOPY      BRONCHOSCOPY, WITH NEEDLE BIOPSY-  TRANSBRONCHIAL BX Diagnosis:       Shortness of breath      (shortness of breath)    Surgeon:  Holly Velasquez M.D. Responsible Provider:  Luis Reyes M.D.    Anesthesia Type:  MAC ASA Status:  3          Final Anesthesia Type: MAC  Last vitals  BP   Blood Pressure: 144/65, NIBP: 144/73    Temp   37.1 °C (98.8 °F)    Pulse   Pulse: 74, Heart Rate (Monitored): 60   Resp   16    SpO2   92 %      Anesthesia Post Evaluation    Patient location during evaluation: PACU  Patient participation: complete - patient participated  Level of consciousness: awake and alert  Pain score: 2    Airway patency: patent  Anesthetic complications: no  Cardiovascular status: hemodynamically stable  Respiratory status: acceptable  Hydration status: euvolemic    PONV: none           Nurse Pain Score: 0 (NPRS)

## 2019-04-24 ENCOUNTER — PATIENT OUTREACH (OUTPATIENT)
Dept: HEALTH INFORMATION MANAGEMENT | Facility: OTHER | Age: 59
End: 2019-04-24

## 2019-04-24 VITALS
TEMPERATURE: 97.8 F | RESPIRATION RATE: 18 BRPM | SYSTOLIC BLOOD PRESSURE: 131 MMHG | HEART RATE: 82 BPM | DIASTOLIC BLOOD PRESSURE: 76 MMHG | OXYGEN SATURATION: 90 % | WEIGHT: 185.63 LBS | HEIGHT: 68 IN | BODY MASS INDEX: 28.13 KG/M2

## 2019-04-24 PROBLEM — B37.0 THRUSH, ORAL: Status: ACTIVE | Noted: 2019-04-24

## 2019-04-24 PROBLEM — D72.829 LEUKOCYTOSIS (LEUCOCYTOSIS): Status: RESOLVED | Noted: 2019-04-22 | Resolved: 2019-04-24

## 2019-04-24 PROBLEM — J18.9 ATYPICAL PNEUMONIA: Status: RESOLVED | Noted: 2019-04-19 | Resolved: 2019-04-24

## 2019-04-24 PROBLEM — J15.9 COMMUNITY ACQUIRED BACTERIAL PNEUMONIA: Status: ACTIVE | Noted: 2019-04-24

## 2019-04-24 LAB
ALBUMIN SERPL BCP-MCNC: 2.6 G/DL (ref 3.2–4.9)
BACTERIA BLD CULT: NORMAL
BACTERIA BLD CULT: NORMAL
BASOPHILS # BLD AUTO: 0.4 % (ref 0–1.8)
BASOPHILS # BLD: 0.03 K/UL (ref 0–0.12)
BUN SERPL-MCNC: 13 MG/DL (ref 8–22)
CALCIUM SERPL-MCNC: 8.6 MG/DL (ref 8.4–10.2)
CHLORIDE SERPL-SCNC: 102 MMOL/L (ref 96–112)
CO2 SERPL-SCNC: 28 MMOL/L (ref 20–33)
CREAT SERPL-MCNC: 0.63 MG/DL (ref 0.5–1.4)
CYTOLOGY REG CYTOL: NORMAL
EOSINOPHIL # BLD AUTO: 0.16 K/UL (ref 0–0.51)
EOSINOPHIL NFR BLD: 2.1 % (ref 0–6.9)
ERYTHROCYTE [DISTWIDTH] IN BLOOD BY AUTOMATED COUNT: 44.1 FL (ref 35.9–50)
GLUCOSE SERPL-MCNC: 111 MG/DL (ref 65–99)
GRAM STN SPEC: NORMAL
HCT VFR BLD AUTO: 44.3 % (ref 37–47)
HGB BLD-MCNC: 14.6 G/DL (ref 12–16)
IMM GRANULOCYTES # BLD AUTO: 0.1 K/UL (ref 0–0.11)
IMM GRANULOCYTES NFR BLD AUTO: 1.3 % (ref 0–0.9)
LYMPHOCYTES # BLD AUTO: 0.87 K/UL (ref 1–4.8)
LYMPHOCYTES NFR BLD: 11.3 % (ref 22–41)
MAGNESIUM SERPL-MCNC: 2 MG/DL (ref 1.5–2.5)
MCH RBC QN AUTO: 29.7 PG (ref 27–33)
MCHC RBC AUTO-ENTMCNC: 33 G/DL (ref 33.6–35)
MCV RBC AUTO: 90 FL (ref 81.4–97.8)
MONOCYTES # BLD AUTO: 0.63 K/UL (ref 0–0.85)
MONOCYTES NFR BLD AUTO: 8.2 % (ref 0–13.4)
NEUTROPHILS # BLD AUTO: 5.91 K/UL (ref 2–7.15)
NEUTROPHILS NFR BLD: 76.7 % (ref 44–72)
NRBC # BLD AUTO: 0 K/UL
NRBC BLD-RTO: 0 /100 WBC
PHOSPHATE SERPL-MCNC: 3.6 MG/DL (ref 2.5–4.5)
PLATELET # BLD AUTO: 278 K/UL (ref 164–446)
PMV BLD AUTO: 9.4 FL (ref 9–12.9)
POTASSIUM SERPL-SCNC: 3.7 MMOL/L (ref 3.6–5.5)
RBC # BLD AUTO: 4.92 M/UL (ref 4.2–5.4)
RHODAMINE-AURAMINE STN SPEC: NORMAL
SIGNIFICANT IND 70042: NORMAL
SITE SITE: NORMAL
SODIUM SERPL-SCNC: 137 MMOL/L (ref 135–145)
SOURCE SOURCE: NORMAL
WBC # BLD AUTO: 7.7 K/UL (ref 4.8–10.8)

## 2019-04-24 PROCEDURE — 99239 HOSP IP/OBS DSCHRG MGMT >30: CPT | Performed by: INTERNAL MEDICINE

## 2019-04-24 PROCEDURE — 80069 RENAL FUNCTION PANEL: CPT

## 2019-04-24 PROCEDURE — 700102 HCHG RX REV CODE 250 W/ 637 OVERRIDE(OP): Performed by: HOSPITALIST

## 2019-04-24 PROCEDURE — 85025 COMPLETE CBC W/AUTO DIFF WBC: CPT

## 2019-04-24 PROCEDURE — A9270 NON-COVERED ITEM OR SERVICE: HCPCS | Performed by: INTERNAL MEDICINE

## 2019-04-24 PROCEDURE — 94760 N-INVAS EAR/PLS OXIMETRY 1: CPT

## 2019-04-24 PROCEDURE — 700105 HCHG RX REV CODE 258: Performed by: INTERNAL MEDICINE

## 2019-04-24 PROCEDURE — 700111 HCHG RX REV CODE 636 W/ 250 OVERRIDE (IP): Performed by: INTERNAL MEDICINE

## 2019-04-24 PROCEDURE — 700101 HCHG RX REV CODE 250: Performed by: HOSPITALIST

## 2019-04-24 PROCEDURE — 700102 HCHG RX REV CODE 250 W/ 637 OVERRIDE(OP): Performed by: INTERNAL MEDICINE

## 2019-04-24 PROCEDURE — A9270 NON-COVERED ITEM OR SERVICE: HCPCS | Performed by: HOSPITALIST

## 2019-04-24 PROCEDURE — 83735 ASSAY OF MAGNESIUM: CPT

## 2019-04-24 PROCEDURE — 99232 SBSQ HOSP IP/OBS MODERATE 35: CPT | Performed by: INTERNAL MEDICINE

## 2019-04-24 PROCEDURE — 94640 AIRWAY INHALATION TREATMENT: CPT

## 2019-04-24 RX ORDER — PREDNISONE 20 MG/1
20 TABLET ORAL DAILY
Qty: 4 TAB | Refills: 0 | Status: SHIPPED | OUTPATIENT
Start: 2019-04-24 | End: 2019-04-28

## 2019-04-24 RX ORDER — FLUTICASONE PROPIONATE 110 UG/1
2 AEROSOL, METERED RESPIRATORY (INHALATION) EVERY 12 HOURS
Qty: 1 INHALER | Refills: 1 | Status: SHIPPED | OUTPATIENT
Start: 2019-04-24 | End: 2019-04-24

## 2019-04-24 RX ORDER — PREDNISONE 20 MG/1
20 TABLET ORAL DAILY
Qty: 4 TAB | Refills: 0 | Status: SHIPPED | OUTPATIENT
Start: 2019-04-24 | End: 2019-04-24

## 2019-04-24 RX ORDER — AMOXICILLIN AND CLAVULANATE POTASSIUM 875; 125 MG/1; MG/1
1 TABLET, FILM COATED ORAL EVERY 12 HOURS
Status: DISCONTINUED | OUTPATIENT
Start: 2019-04-24 | End: 2019-04-24 | Stop reason: HOSPADM

## 2019-04-24 RX ORDER — IPRATROPIUM BROMIDE AND ALBUTEROL SULFATE 2.5; .5 MG/3ML; MG/3ML
3 SOLUTION RESPIRATORY (INHALATION) 4 TIMES DAILY
Qty: 30 BULLET | Refills: 1 | Status: SHIPPED | OUTPATIENT
Start: 2019-04-24 | End: 2019-07-18

## 2019-04-24 RX ORDER — FLUTICASONE PROPIONATE 110 UG/1
2 AEROSOL, METERED RESPIRATORY (INHALATION) EVERY 12 HOURS
Qty: 1 INHALER | Refills: 1 | Status: SHIPPED | OUTPATIENT
Start: 2019-04-24 | End: 2019-06-17 | Stop reason: SDUPTHER

## 2019-04-24 RX ORDER — BENZONATATE 100 MG/1
100 CAPSULE ORAL 3 TIMES DAILY PRN
Qty: 60 CAP | Refills: 0 | Status: SHIPPED | OUTPATIENT
Start: 2019-04-24 | End: 2019-07-18

## 2019-04-24 RX ORDER — BENZONATATE 100 MG/1
100 CAPSULE ORAL 3 TIMES DAILY PRN
Qty: 60 CAP | Refills: 0 | Status: SHIPPED | OUTPATIENT
Start: 2019-04-24 | End: 2019-04-24

## 2019-04-24 RX ORDER — AMOXICILLIN AND CLAVULANATE POTASSIUM 875; 125 MG/1; MG/1
1 TABLET, FILM COATED ORAL EVERY 12 HOURS
Qty: 8 TAB | Refills: 0 | Status: SHIPPED | OUTPATIENT
Start: 2019-04-24 | End: 2019-04-28

## 2019-04-24 RX ORDER — AMOXICILLIN AND CLAVULANATE POTASSIUM 875; 125 MG/1; MG/1
1 TABLET, FILM COATED ORAL EVERY 12 HOURS
Qty: 8 TAB | Refills: 0 | Status: SHIPPED | OUTPATIENT
Start: 2019-04-24 | End: 2019-04-24

## 2019-04-24 RX ADMIN — PREDNISONE 40 MG: 20 TABLET ORAL at 06:29

## 2019-04-24 RX ADMIN — LEVOTHYROXINE SODIUM 88 MCG: 88 TABLET ORAL at 06:29

## 2019-04-24 RX ADMIN — CEFTRIAXONE SODIUM 2 G: 2 INJECTION, POWDER, FOR SOLUTION INTRAMUSCULAR; INTRAVENOUS at 06:26

## 2019-04-24 RX ADMIN — NYSTATIN 500000 UNITS: 500000 SUSPENSION ORAL at 08:31

## 2019-04-24 RX ADMIN — AMOXICILLIN AND CLAVULANATE POTASSIUM 1 TABLET: 875; 125 TABLET, FILM COATED ORAL at 08:30

## 2019-04-24 RX ADMIN — FLUTICASONE PROPIONATE 220 MCG: 110 AEROSOL, METERED RESPIRATORY (INHALATION) at 06:36

## 2019-04-24 RX ADMIN — IPRATROPIUM BROMIDE AND ALBUTEROL SULFATE 3 ML: .5; 3 SOLUTION RESPIRATORY (INHALATION) at 06:36

## 2019-04-24 RX ADMIN — IPRATROPIUM BROMIDE AND ALBUTEROL SULFATE 3 ML: .5; 3 SOLUTION RESPIRATORY (INHALATION) at 10:17

## 2019-04-24 ASSESSMENT — ENCOUNTER SYMPTOMS
STRIDOR: 0
SPUTUM PRODUCTION: 0
FEVER: 0
PALPITATIONS: 0
CHILLS: 0
SHORTNESS OF BREATH: 1
SORE THROAT: 0
SINUS PAIN: 0
WHEEZING: 0
COUGH: 1
HEMOPTYSIS: 0

## 2019-04-24 NOTE — DISCHARGE PLANNING
Received Choice form at [id number]  Agency/Facility Name: [facility]  Referral sent per Choice form @ [phone]

## 2019-04-24 NOTE — FLOWSHEET NOTE
04/23/19 2153   Events/Summary/Plan   Events/Summary/Plan SVN f/w MDI-therapy delayed: on 2nd floor with  pt <Sp02 and new Q4 therapy   Interdisciplinary Plan of Care-Goals (Indications)   Bronchodilator Indications History / Diagnosis   Interdisciplinary Plan of Care-Outcomes    Bronchodilator Outcome Patient at Stable Baseline   Education   Education Yes - Pt. / Family has been Instructed in use of Respiratory Medications and Adverse Reactions   RT Assessment of Delivered Medications   Evaluation of Medication Delivery Daily Yes-- Pt /Family has been Instructed in use of Respiratory Medications and Adverse Reactions   SVN Group   #SVN Performed Yes   Given By: Mouthpiece   MDI/DPI Group   #MDI/DPI Given MDI/DPI x 1   Chest Exam   Work Of Breathing / Effort Mild   Respiration 18   Pulse 75   Breath Sounds   RUL Breath Sounds Clear;Diminished   RML Breath Sounds Clear;Diminished   RLL Breath Sounds Diminished   BECCA Breath Sounds Clear;Diminished   LLL Breath Sounds Diminished   Secretions   Cough Non Productive;Strong   How Sputum Obtained Cough on Request   Oximetry   #Pulse Oximetry (Single Determination) Yes   Oxygen   Home O2 Use Prior To Admission? No   Pulse Oximetry 93 %   O2 (LPM) 2   O2 Daily Delivery Respiratory  Silicone Nasal Cannula

## 2019-04-24 NOTE — ASSESSMENT & PLAN NOTE
Completed azithromycin 5 days   Bronchoscopy findings including polys 100% suggestive of significant bacterial pneumonia  [place] abx to po augmentin to complete 10 days   May d/c home   Given contacts for our clinic for f/u   Will call pt for lung biopsy results

## 2019-04-24 NOTE — PROGRESS NOTES
[facility] sent a fax for need for clarification for suspension of nystatin; called them and they don't know when they'll have it in stock.  Called [facility] and [facility] in the area and they both stock it.  Asked the pt which one she prefers and she said that she moved her prescriptions to [facility] [address].  Informed Dr. Lopez of this change and she was going to resend them.    Numbers obtained for oxygen needs and pt will need oxygen as she desats to 88% with ambulation.  Let Zeynep  know about her needs for oxygen and she was going to get choice form.

## 2019-04-24 NOTE — DISCHARGE PLANNING
Anticipated Discharge Disposition: nebulizer    Action: LSW spoke with patient regarding DME order. Pt stated no preference on DME company, signed choice for [facility].     LSW faxed choice to [facility]    Barriers to Discharge: nebulizer acceptance     Plan: LSW to f/u on referral

## 2019-04-24 NOTE — PROGRESS NOTES
Zeynep called to inform me that her insurance would not cover for her diagnosis.  Ambulated again and pt able to compensate up to 90% when she stopped ambulated.  Pt also states that she has pulse oximetry at home and will check her sats.      Zeynep called about 1340 to let me know that [facility] would deliver at home.  Discharging Patient home per physician order.  Discharged with  to home at 1355.  Demonstrated understanding of discharge instructions, follow up appointments, home medications, prescriptions sent to [facility], and nursing care instructions for nebulizer use and pna.  Ambulating without assistance, voiding without difficulty, pain well controlled, tolerating oral medications, oxygen saturation greater than 90% , tolerating diet.   Educational handouts given and discussed.  Verbalized understanding of discharge instructions and educational handouts.  All questions answered.  Belongings with patient at time of discharge. Pt was sent home with paperwork in hand.

## 2019-04-24 NOTE — DISCHARGE INSTRUCTIONS
Discharge Instructions    Discharged to home by car with relative. Discharged via wheelchair, hospital escort: Yes.  Special equipment needed:  Nebulizer    How to Use a Nebulizer, Adult  A nebulizer is a device that turns liquid medicine into a mist (vapor) that you can breathe in (inhale). You may need to use a nebulizer if you have a breathing illness, such as asthma or pneumonia.  There are different kinds of nebulizers. With some, you breathe in through a mouthpiece. With others, a mask fits over your nose and mouth.  Risks and complications  Using a nebulizer that does not fit right or is not cleaned right can lead to the following complications:  · Infection.  · Eye irritation.  · Delivery of too much medicine or not enough medicine.  · Mouth irritation.  How to prepare before using a nebulizer  Take these steps before using your nebulizer:  1. Check your medicine. Make sure it has not  and is not damaged in any way.  2. Wash your hands with soap and water.  3. Put all of the parts of your nebulizer on a sturdy, flat surface. Make sure all of the tubing is connected.  4. Measure the liquid medicine according to instructions from your health care provider. Pour the liquid into the part of the nebulizer that holds the medicine (reservoir).  5. Attach the mouthpiece or mask.  6. Test the nebulizer by turning it on to make sure that a spray comes out. Then, turn it off.  How to use a nebulizer  1. Sit down and relax.  2. If your nebulizer has a mask, put it over your nose and mouth. It should fit somewhat snugly, with no gaps around the nose or cheeks where medicine could escape. If you use a mouthpiece, put it in your mouth. Press your lips firmly around the mouthpiece.  3. Turn on the nebulizer.  4. Breathe out (exhale).  5. Some nebulizers have a finger valve. If yours does, cover up the air hole so the air gets to the nebulizer.  6. Once the medicine begins to mist out, take slow, deep breaths. If  there is a finger valve, release it at the end of your breath.  7. Continue taking slow, deep breaths until the medicine in the nebulizer is gone and no vapor appears.  Be sure to stop the machine at any time if you start coughing or if the medicine foams or bubbles.  How to clean a nebulizer  The nebulizer and all of its parts must be kept very clean. If the nebulizer and its parts are not cleaned properly, bacteria can grow inside of them. If you inhale the bacteria, you can get sick. Follow the 's instructions for cleaning your nebulizer. For most nebulizers, you should follow these guidelines:  · Wash the nebulizer after each use. Use warm water and soap. Make sure to wash the mouthpiece or mask and the medicine area, but do not wash the tubing or mouthpiece.  · After you wash the nebulizer, place its parts on a clean towel and let them dry completely. After they dry, reconnect the pieces and turn the nebulizer on without any medicine in it. Doing this will blow air through the equipment to help dry it out.  · Store the nebulizer in a clean and dust-free place.  · Check the filter at least one time every week. Replace it if it looks dirty.  Contact a health care provider if:  · You continue to have trouble breathing.  · You have trouble using the nebulizer.  · Your breathing gets worse during a nebulizer treatment.  · Your nebulizer stops working, foams, or does not create a mist after you add medicine and turn it on.  This information is not intended to replace advice given to you by your health care provider. Make sure you discuss any questions you have with your health care provider.  Document Released: 12/06/2010 Document Revised: 08/17/2017 Document Reviewed: 06/24/2017  [employer] Interactive Patient Education © 2017 [employer]        Be sure to schedule a follow-up appointment with your primary care doctor or any specialists as instructed.     Discharge Plan:   Influenza Vaccine Indication:  Not indicated: Previously immunized this influenza season and > 8 years of age    I understand that a diet low in cholesterol, fat, and sodium is recommended for good health. Unless I have been given specific instructions below for another diet, I accept this instruction as my diet prescription.   Other diet: as specified for gastric bypass    Special Instructions:     Community-Acquired Pneumonia, Adult  Pneumonia is an infection of the lungs. There are different types of pneumonia. One type can develop while a person is in a hospital. A different type, called community-acquired pneumonia, develops in people who are not, or have not recently been, in the hospital or other health care facility.  What are the causes?  Pneumonia may be caused by bacteria, viruses, or funguses. Community-acquired pneumonia is often caused by Streptococcus pneumonia bacteria. These bacteria are often passed from one person to another by breathing in droplets from the cough or sneeze of an infected person.  What increases the risk?  The condition is more likely to develop in:  · People who have chronic diseases, such as chronic obstructive pulmonary disease (COPD), asthma, congestive heart failure, cystic fibrosis, diabetes, or kidney disease.  · People who have early-stage or late-stage HIV.  · People who have sickle cell disease.  · People who have had their spleen removed (splenectomy).  · People who have poor dental hygiene.  · People who have medical conditions that increase the risk of breathing in (aspirating) secretions their own mouth and nose.  · People who have a weakened immune system (immunocompromised).  · People who smoke.  · People who travel to areas where pneumonia-causing germs commonly exist.  · People who are around animal habitats or animals that have pneumonia-causing germs, including birds, bats, rabbits, cats, and farm animals.  What are the signs or symptoms?  Symptoms of this condition include:  · A dry  cough.  · A wet (productive) cough.  · Fever.  · Sweating.  · Chest pain, especially when breathing deeply or coughing.  · Rapid breathing or difficulty breathing.  · Shortness of breath.  · Shaking chills.  · Fatigue.  · Muscle aches.  How is this diagnosed?  Your health care provider will take a medical history and perform a physical exam. You may also have other tests, including:  · Imaging studies of your chest, including X-rays.  · Tests to check your blood oxygen level and other blood gases.  · Other tests on blood, mucus (sputum), fluid around your lungs (pleural fluid), and urine.  If your pneumonia is severe, other tests may be done to identify the specific cause of your illness.  How is this treated?  The type of treatment that you receive depends on many factors, such as the cause of your pneumonia, the medicines you take, and other medical conditions that you have. For most adults, treatment and recovery from pneumonia may occur at home. In some cases, treatment must happen in a hospital. Treatment may include:  · Antibiotic medicines, if the pneumonia was caused by bacteria.  · Antiviral medicines, if the pneumonia was caused by a virus.  · Medicines that are given by mouth or through an IV tube.  · Oxygen.  · Respiratory therapy.  Although rare, treating severe pneumonia may include:  · Mechanical ventilation. This is done if you are not breathing well on your own and you cannot maintain a safe blood oxygen level.  · Thoracentesis. This procedure removes fluid around one lung or both lungs to help you breathe better.  Follow these instructions at home:  · Take over-the-counter and prescription medicines only as told by your health care provider.  ¨ Only take cough medicine if you are losing sleep. Understand that cough medicine can prevent your body’s natural ability to remove mucus from your lungs.  ¨ If you were prescribed an antibiotic medicine, take it as told by your health care provider. Do not  stop taking the antibiotic even if you start to feel better.  · Sleep in a semi-upright position at night. Try sleeping in a reclining chair, or place a few pillows under your head.  · Do not use tobacco products, including cigarettes, chewing tobacco, and e-cigarettes. If you need help quitting, ask your health care provider.  · Drink enough water to keep your urine clear or pale yellow. This will help to thin out mucus secretions in your lungs.  How is this prevented?  There are ways that you can decrease your risk of developing community-acquired pneumonia. Consider getting a pneumococcal vaccine if:  · You are older than 65 years of age.  · You are older than 19 years of age and are undergoing cancer treatment, have chronic lung disease, or have other medical conditions that affect your immune system. Ask your health care provider if this applies to you.  There are different types and schedules of pneumococcal vaccines. Ask your health care provider which vaccination option is best for you.  You may also prevent community-acquired pneumonia if you take these actions:  · Get an influenza vaccine every year. Ask your health care provider which type of influenza vaccine is best for you.  · Go to the dentist on a regular basis.  · Wash your hands often. Use hand  if soap and water are not available.  Contact a health care provider if:  · You have a fever.  · You are losing sleep because you cannot control your cough with cough medicine.  Get help right away if:  · You have worsening shortness of breath.  · You have increased chest pain.  · Your sickness becomes worse, especially if you are an older adult or have a weakened immune system.  · You cough up blood.  This information is not intended to replace advice given to you by your health care provider. Make sure you discuss any questions you have with your health care provider.  Document Released: 12/18/2006 Document Revised: 04/27/2017 Document Reviewed:  04/13/2016  [employer] Interactive Patient Education © 2017 [employer]      · Is patient discharged on Warfarin / Coumadin?   No     Depression / Suicide Risk    As you are discharged from this [facility] facility, it is important to learn how to keep safe from harming yourself.    Recognize the warning signs:  · Abrupt changes in personality, positive or negative- including increase in energy   · Giving away possessions  · Change in eating patterns- significant weight changes-  positive or negative  · Change in sleeping patterns- unable to sleep or sleeping all the time   · Unwillingness or inability to communicate  · Depression  · Unusual sadness, discouragement and loneliness  · Talk of wanting to die  · Neglect of personal appearance   · Rebelliousness- reckless behavior  · Withdrawal from people/activities they love  · Confusion- inability to concentrate     If you or a loved one observes any of these behaviors or has concerns about self-harm, here's what you can do:  · Talk about it- your feelings and reasons for harming yourself  · Remove any means that you might use to hurt yourself (examples: pills, rope, extension cords, firearm)  · Get professional help from the community (Mental Health, Substance Abuse, psychological counseling)  · Do not be alone:Call your Safe Contact- someone whom you trust who will be there for you.  · Call your local CRISIS HOTLINE 101-2041 or 457-778-9401  · Call your local Children's Mobile Crisis Response Team [place] (054) 418-8862 or www.[employer]  · Call the toll free National Suicide Prevention Hotlines   · National Suicide Prevention Lifeline 220-054-PKQS (7174)  · National Hope Line Network 800-SUICIDE (820-6651)

## 2019-04-24 NOTE — PROGRESS NOTES
Pulmonary Progress Note    Date of admission  4/19/2019    Chief Complaint  58 y.o. female admitted 4/19/2019 with dry cough and dyspnea x2 days      Hospital Course  4/19 admitted with severe dyspnea and dry cough as well as fever x2 days  CT chest showed BL pulm edema   4/19-20 required HFNC 50 LPM in ICU   Echo EF 65% no diastolic dysfunction or valve disease  PCT was 4.14 then down to 1.67 with rocephin + azithromycin   4/22 down to 5 LPM with significant symptomatic improvement   4/23 down to 3 LPM, underwent bronchoscopy which showed aggressive bilateral purulent pneumonia      Interval Problem Update  Reviewed last 24 hour events:  O2 requirement down to 2 LPM   Symptomatically improving   Afebrile x24 h  WBC normalized       Review of Systems  Review of Systems   Constitutional: Negative for chills and fever.   HENT: Negative for sinus pain and sore throat.    Respiratory: Positive for cough and shortness of breath. Negative for hemoptysis, sputum production, wheezing and stridor.    Cardiovascular: Negative for chest pain and palpitations.   Skin: Negative for itching and rash.        Vital Signs for last 24 hours   Temp:  [36.6 °C (97.8 °F)-37.3 °C (99.1 °F)] 36.9 °C (98.5 °F)  Pulse:  [53-76] 70  Resp:  [16-20] 18  BP: (119-144)/(58-78) 125/74  SpO2:  [82 %-96 %] 93 %      Physical Exam   Physical Exam   Constitutional: She is oriented to person, place, and time. No distress.   HENT:   Head: Normocephalic and atraumatic.   Mouth/Throat: Oropharyngeal exudate present.   Thrush on hard palate    Eyes: Right eye exhibits no discharge. Left eye exhibits no discharge. No scleral icterus.   Neck: No tracheal deviation present. No thyromegaly present.   Cardiovascular: Normal rate, regular rhythm, normal heart sounds and intact distal pulses.  Exam reveals no gallop and no friction rub.    No murmur heard.  Pulmonary/Chest: Effort normal. No stridor. No respiratory distress. She has no wheezes. She has rales.  She exhibits no tenderness.   Abdominal: Soft. She exhibits no distension. There is no tenderness.   Musculoskeletal: She exhibits no edema, tenderness or deformity.   Lymphadenopathy:     She has no cervical adenopathy.   Neurological: She is alert and oriented to person, place, and time.   Skin: Skin is warm. No rash noted. She is not diaphoretic. No erythema. No pallor.   Psychiatric: She has a normal mood and affect. Her behavior is normal. Judgment and thought content normal.   Nursing note and vitals reviewed.      Medications  Current Facility-Administered Medications   Medication Dose Route Frequency Provider Last Rate Last Dose   • cefTRIAXone (ROCEPHIN) 2 g in  mL IVPB  2 g Intravenous Q24HRS Holly Velasquez M.D. 200 mL/hr at 04/24/19 0626 2 g at 04/24/19 0626   • fluticasone (FLOVENT HFA) 110 MCG/ACT inhaler 220 mcg  2 Puff Inhalation Q12HRS (RT) Holly Velasquez M.D.   220 mcg at 04/24/19 0636   • predniSONE (DELTASONE) tablet 40 mg  40 mg Oral DAILY Holly Velasquez M.D.   40 mg at 04/24/19 0629   • levothyroxine (SYNTHROID) tablet 88 mcg  88 mcg Oral AM ES Rosalina Amaya M.D.   88 mcg at 04/24/19 0629   • senna-docusate (PERICOLACE or SENOKOT S) 8.6-50 MG per tablet 2 Tab  2 Tab Oral BID Rosalina Amaya M.D.   Stopped at 04/20/19 0600    And   • polyethylene glycol/lytes (MIRALAX) PACKET 1 Packet  1 Packet Oral QDAY PRN Rosalina Amaya M.D.        And   • magnesium hydroxide (MILK OF MAGNESIA) suspension 30 mL  30 mL Oral QDAY PRN Rosalina Amaya M.D.        And   • bisacodyl (DULCOLAX) suppository 10 mg  10 mg Rectal QDAY PRN Rosalina Amaya M.D.       • Respiratory Care per Protocol   Nebulization Continuous RT Rosalina Amaya M.D.       • acetaminophen (TYLENOL) tablet 650 mg  650 mg Oral Q6HRS PRN Rosalina Amaya M.D.   650 mg at 04/19/19 1209   • guaiFENesin dextromethorphan (ROBITUSSIN DM) 100-10 MG/5ML syrup 10 mL  10 mL Oral Q6HRS PRN  Rosalina Amaya M.D.   10 mL at 04/21/19 2107   • ipratropium-albuterol (DUONEB) nebulizer solution  3 mL Nebulization 4X/DAY (RT) Rosalina Amaya M.D.   3 mL at 04/24/19 0636   • albuterol (PROVENTIL) 2.5mg/0.5ml nebulizer solution 2.5 mg  2.5 mg Nebulization RT EVERY 1 HOUR PRN Rosalina Amaya M.D.       • benzocaine-menthol (CEPACOL) lozenge 1 Lozenge  1 Lozenge Mouth/Throat Q2HRS PRN Rosa Elena Richard M.D.   1 Lozenge at 04/19/19 1841   • benzonatate (TESSALON) capsule 100 mg  100 mg Oral TID PRN Rosa Elena Richard M.D.   100 mg at 04/21/19 2107   • enoxaparin (LOVENOX) inj 40 mg  40 mg Subcutaneous DAILY Rosa Elena Richard M.D.   40 mg at 04/22/19 0621       Fluids    Intake/Output Summary (Last 24 hours) at 04/24/19 0647  Last data filed at 04/24/19 0626   Gross per 24 hour   Intake             1920 ml   Output                0 ml   Net             1920 ml       Laboratory      Recent Labs      04/21/19   1000   BNPBTYPENAT  360*     Recent Labs      04/21/19   1000  04/23/19   0317  04/24/19   0357   SODIUM  140  140  137   POTASSIUM  4.6  3.9  3.7   CHLORIDE  108  104  102   CO2  22  28  28   BUN  16  13  13   CREATININE  0.56  0.51  0.63   MAGNESIUM   --    --   2.0   PHOSPHORUS   --    --   3.6   CALCIUM  9.4  8.8  8.6     Recent Labs      04/21/19   1000  04/23/19   0317  04/24/19   0357   GLUCOSE  263*  122*  111*     Recent Labs      04/22/19   0240  04/23/19   0317  04/24/19   0357   WBC  14.9*  10.1  7.7   NEUTSPOLYS  94.90*  85.60*  76.70*   LYMPHOCYTES  1.80*  5.80*  11.30*   MONOCYTES  3.00  7.20  8.20   EOSINOPHILS  0.00  0.20  2.10   BASOPHILS  0.10  0.20  0.40     Recent Labs      04/22/19   0240  04/23/19   0317  04/24/19   0357   RBC  4.57  4.65  4.92   HEMOGLOBIN  13.4  13.7  14.6   HEMATOCRIT  41.5  41.8  44.3   PLATELETCT  258  292  278   PROTHROMBTM   --   13.3   --    INR   --   1.02   --        Imaging  X-Ray:  I have personally reviewed the images and compared with prior  images.    Assessment/Plan  * Community acquired bacterial pneumonia   Assessment & Plan    Completed azithromycin 5 days   Bronchoscopy findings including polys 100% suggestive of significant bacterial pneumonia  [place] abx to po augmentin to complete 10 days   May d/c home   Given contacts for our clinic for f/u   Will call pt for lung biopsy results        Acute hypoxemic respiratory failure (HCC)   Assessment & Plan    Improving, now down to 2 LPM   Home O2 eval   Titrate oxygen for SpO2 >= 88%         Moderate persistent asthma   Assessment & Plan    On Arnuity at home   Continue flovent in-house   prednisone 40 mg po daily x5 days then stop        Thrush, oral   Assessment & Plan    Nystatin swish and swallow x3 days      Hyperglycemia   Assessment & Plan    Controlled now  111 this AM             I have performed a physical exam and reviewed and updated ROS and Plan today (4/24/2019). In review of yesterday's note (4/23/2019), there are no changes except as documented above.

## 2019-04-24 NOTE — FLOWSHEET NOTE
04/24/19 0636   Interdisciplinary Plan of Care-Goals (Indications)   Bronchodilator Indications History / Diagnosis   Interdisciplinary Plan of Care-Outcomes    Bronchodilator Outcome Patient at Stable Baseline   Education   Education Yes - Pt. / Family has been Instructed in use of Respiratory Equipment;Yes - Pt. / Family has been Instructed in use of Respiratory Medications and Adverse Reactions   RT Assessment of Delivered Medications   Evaluation of Medication Delivery Daily Yes-- Pt /Family has been Instructed in use of Respiratory Medications and Adverse Reactions   SVN Group   #SVN Performed Yes   Given By: Mouthpiece   MDI/DPI Group   #MDI/DPI Given MDI/DPI x 1   Incentive Spirometry Group   Incentive Spirometry Instruction Yes   Breathing Exercises Yes   Incentive Spirometer Volume 2250 mL   Respiratory WDL   Respiratory (WDL) X   Chest Exam   Respiration 18   Heart Rate (Monitored) 70   Breath Sounds   Pre/Post Intervention Pre Intervention Assessment   RUL Breath Sounds Fine Crackles   RML Breath Sounds Fine Crackles   RLL Breath Sounds Diminished   BECCA Breath Sounds Fine Crackles   LLL Breath Sounds Diminished   Oximetry   #Pulse Oximetry (Single Determination) Yes   Oxygen   Pulse Oximetry 93 %   O2 (LPM) 2   O2 Daily Delivery Respiratory  Silicone Nasal Cannula

## 2019-04-24 NOTE — CARE PLAN
Problem: Respiratory:  Goal: Respiratory status will improve  Outcome: PROGRESSING AS EXPECTED  Patient declines any SOB at rest and during ambulation. Patient is on 2L NC.    Problem: Knowledge Deficit  Goal: Knowledge of disease process/condition, treatment plan, diagnostic tests, and medications will improve  Outcome: PROGRESSING AS EXPECTED  Discussed plan of care with patient including prn medications available, weaning off of oxygen, and vital sign monitoring. Allowed time for questions, patient verbalized understanding, will continue to monitor.

## 2019-04-24 NOTE — FLOWSHEET NOTE
04/24/19 1017   Interdisciplinary Plan of Care-Goals (Indications)   Bronchodilator Indications History / Diagnosis   Interdisciplinary Plan of Care-Outcomes    Bronchodilator Outcome Patient at Stable Baseline   RT Assessment of Delivered Medications   Evaluation of Medication Delivery Daily Yes-- Pt /Family has been Instructed in use of Respiratory Medications and Adverse Reactions   SVN Group   #SVN Performed Yes   Given By: Mouthpiece   Respiratory WDL   Respiratory (WDL) X   Chest Exam   Respiration 18   Heart Rate (Monitored) 70   Breath Sounds   RUL Breath Sounds Clear;Diminished   RML Breath Sounds Clear;Diminished   RLL Breath Sounds Diminished   BECCA Breath Sounds Clear;Diminished   LLL Breath Sounds Diminished   Oxygen   O2 (LPM) 2   O2 Daily Delivery Respiratory  Silicone Nasal Cannula

## 2019-04-24 NOTE — PROGRESS NOTES
Report received from Dr. Gissel Rizvi at bedside during this and she is ok to d/c from his standpoint with oral antibiotics and nystatin and then check for home oxygen needs.    Morning assessment done about 0835 after pt finished breakfast.  First dose of oral augmentin given as well as nystatin.  Pt wondering if Dr. Lopez would write a doctors note through this week and allow her to work from home.  When talking about discharge, pt states she lives in the [facility] which is at [address]  Pt showering independently and then will check her oxygen needs.  For f/u appt, pt wants to make her own around her schedule.

## 2019-04-24 NOTE — FLOWSHEET NOTE
04/23/19 1742   Oxygen   Pulse Oximetry 93 %   O2 (LPM) 2   O2 Daily Delivery Respiratory  Silicone Nasal Cannula

## 2019-04-24 NOTE — DISCHARGE PLANNING
LSW placed call to  to have appointment set to f/u with Dr. Fong (per pts request). Per , they can not schedule hospital d/c f/u appointments with APRNs. Patient will need to call once d/c to set appointment.

## 2019-04-24 NOTE — PROGRESS NOTES
1900 Report received from Lesa CANAS. Plan of care discussed. Patient resting comfortably in bed. Safety precautions in place.     2000 Assessment completed, patient is alert and oriented x 4, patient declined any pain or shortness of breath at this time. Patient continues to use IS which is at bedside. Safety precautions are in place, will continue to monitor.     2300 Patient is sleeping in bed comfortably, respirations are even and unlabored. Safety precautions in place, will continue to monitor.

## 2019-04-24 NOTE — CARE PLAN
Problem: Infection  Goal: Will remain free from infection    Intervention: Implement standard precautions and perform hand washing before and after patient contact  Pt going home on oral antibiotics and prescription sent to [facility]      Problem: Discharge Barriers/Planning  Goal: Patient's continuum of care needs will be met    Intervention: Collaborate with Transitional Care Team and Interdisciplinary Team to meet discharge needs  Pt needing home oxygen as her RA sat was 88%, valves added to doc flowsheet and notified  of this need.

## 2019-04-24 NOTE — PROGRESS NOTES
Medicated patient per MAR, patient declined any pain or SOB at this time. Patient refused lovenox this morning and stated she wanted to walk and take a shower today. Safety precautions are in place, will continue to monitor.

## 2019-04-24 NOTE — PROGRESS NOTES
Report given to Fili CANAS. Plan of care discussed. Patient resting comfortably in bed. Safety precautions in place.

## 2019-04-25 LAB
BACTERIA BRONCH AEROBE CULT: NORMAL
SIGNIFICANT IND 70042: NORMAL
SITE SITE: NORMAL
SOURCE SOURCE: NORMAL

## 2019-04-25 NOTE — DISCHARGE SUMMARY
Discharge Summary    CHIEF COMPLAINT ON ADMISSION  Chief Complaint   Patient presents with   • Shortness of Breath   • Chest Pain       Reason for Admission  Chest Pain, Difficulty Breathing      Admission Date  4/19/2019    CODE STATUS  Full    HPI & HOSPITAL COURSE  This is a 58 y.o. female with a History of pneumonia, hypothyroidism, asthma diagnosis admitted with increasing shortness of breath and cough after traveling, found to have sepsis and acute respiratory failure with hypoxia due to bilateral pneumonia. She was started on empiric antibiotics and supplemental oxygen.  She had marked leukocytosis up to 29 and lactic acidosis.  She required ICU admission and HFNC.  Her chest CT had appearance of pulmonary edema but echocardiogram was normal with an EF of 65%.  Procalcitonin was as high as 4.14 and decreased with abx treatment.  Her O2 slowly improved.  Prednisone was added due to asthma and smoking history and she was weaned as low as 5L.  At this time a bronchoscopy was performed on 4/23 which showed severe bilateral PNA.  Cultures and biopsy were taken from the RUL.  She continued to improve and was weaned to RA.  She was able to ambulate and maintain her saturations >88%, therefor did not require O2 on DC.  She was given augmentin and prednisone to complete as outpatient and was prescribed a nebulizer.  She will re-establish care with pulmonology as an outpatient.    Therefore, she is discharged in good and stable condition to home with close outpatient follow-up.    The patient met 2-midnight criteria for an inpatient stay at the time of discharge.    Discharge Date  4/24/2019    FOLLOW UP ITEMS POST DISCHARGE  F/U with pulmonology ([facility] or [facility]) on DC  F/U with PCP within one week    DISCHARGE DIAGNOSES  Principal Problem:    Community acquired bacterial pneumonia POA: Unknown  Active Problems:    Acute respiratory failure with hypoxia (HCC) POA: Unknown    Lactic acid acidosis POA: Unknown     Demand ischemia (HCC) POA: Unknown    Acute hypoxemic respiratory failure (HCC) POA: Unknown    Moderate persistent asthma POA: Unknown    Hyperglycemia POA: Unknown    Thrush, oral POA: Unknown  Resolved Problems:    Hypoxia POA: Unknown    Atypical pneumonia POA: Unknown    Septic shock (HCC) POA: Unknown    Leukocytosis (leucocytosis) POA: Unknown      FOLLOW UP  No future appointments.  Holly Velasquez M.D.  [address]  [address]  [facility] [address] [phone]    Schedule an appointment as soon as possible for a visit      [facility]   [phone]    To establish with a primary care provider please call this office to set up an appointment.     Pcp Pt States None            MEDICATIONS ON DISCHARGE     Medication List      START taking these medications      Instructions   amoxicillin-clavulanate 875-125 MG Tabs  Commonly known as:  AUGMENTIN   Take 1 Tab by mouth every 12 hours for 4 days.  Dose:  1 Tab     benzonatate 100 MG Caps  Commonly known as:  TESSALON   Take 1 Cap by mouth 3 times a day as needed for Cough.  Dose:  100 mg     fluticasone 110 MCG/ACT Aero  Commonly known as:  FLOVENT HFA   Inhale 2 Puffs by mouth every 12 hours.  Dose:  2 Puff     ipratropium-albuterol 0.5-2.5 (3) MG/3ML nebulizer solution  Commonly known as:  DUONEB   3 mL by Nebulization route 4 times a day.  Dose:  3 mL     nystatin 606828 UNIT/ML Susp  Commonly known as:  MYCOSTATIN   Take 5 mL by mouth 4 times a day for 7 days.  Dose:  5 mL     predniSONE 20 MG Tabs  Commonly known as:  DELTASONE   Take 1 Tab by mouth every day for 4 days.  Dose:  20 mg        CHANGE how you take these medications      Instructions   * albuterol 108 (90 Base) MCG/ACT Aers inhalation aerosol  What changed:  Another medication with the same name was added. Make sure you understand how and when to take each.   Inhale 2 Puffs by mouth every 6 hours as needed for Shortness of Breath.  Dose:  2 Puff     * albuterol 2.5mg/0.5ml Nebu  What  changed:  You were already taking a medication with the same name, and this prescription was added. Make sure you understand how and when to take each.  Commonly known as:  PROVENTIL   0.5 mL by Nebulization route every 6 hours as needed for Shortness of Breath.  Dose:  2.5 mg        * This list has 2 medication(s) that are the same as other medications prescribed for you. Read the directions carefully, and ask your doctor or other care provider to review them with you.            CONTINUE taking these medications      Instructions   cyanocobalamin 500 MCG Tabs  Commonly known as:  VITAMIN B-12   Take 500 mcg by mouth every day.  Dose:  500 mcg     fish oil 1000 MG Caps capsule   Take 1,000 mg by mouth every day.  Dose:  1000 mg     HYDROcodone-acetaminophen 5-325 MG Tabs per tablet  Commonly known as:  NORCO   Take 1-2 Tabs by mouth every four hours as needed.  Dose:  1-2 Tab     levothyroxine 75 MCG Tabs  Commonly known as:  SYNTHROID   Take 88 mcg by mouth Every morning on an empty stomach.  Dose:  88 mcg     lisinopril 20 MG Tabs  Commonly known as:  PRINIVIL   Take 20 mg by mouth every day.  Dose:  20 mg     magnesium gluconate 500 MG tablet  Commonly known as:  MAG-G   Take 500 mg by mouth every day.  Dose:  500 mg     therapeutic multivitamin-minerals Tabs   Take 1 Tab by mouth every day.  Dose:  1 Tab     vitamin D 1000 UNIT Tabs  Commonly known as:  cholecalciferol   Take 500 Units by mouth every day.  Dose:  500 Units            Allergies  Allergies   Allergen Reactions   • Codeine    • Sulfa Drugs        DIET  No orders of the defined types were placed in this encounter.      ACTIVITY  As tolerated.  Weight bearing as tolerated    CONSULTATIONS  Dr. Velasquez - ICU/Pulm    PROCEDURES  4/23: Bronchocscopy- 1.  Severe bilateral pneumonia with purulent mucus plugs removed  2.  Friable mucosa secondary to inflammation     LABORATORY  Lab Results   Component Value Date    SODIUM 137 04/24/2019    POTASSIUM 3.7  04/24/2019    CHLORIDE 102 04/24/2019    CO2 28 04/24/2019    GLUCOSE 111 (H) 04/24/2019    BUN 13 04/24/2019    CREATININE 0.63 04/24/2019        Lab Results   Component Value Date    WBC 7.7 04/24/2019    HEMOGLOBIN 14.6 04/24/2019    HEMATOCRIT 44.3 04/24/2019    PLATELETCT 278 04/24/2019        Total time of the discharge process exceeds 43 minutes.

## 2019-04-27 LAB — MISCELLANEOUS LAB RESULT MISCLAB: NORMAL

## 2019-05-02 ENCOUNTER — OFFICE VISIT (OUTPATIENT)
Dept: PULMONOLOGY | Facility: HOSPICE | Age: 59
End: 2019-05-02
Payer: COMMERCIAL

## 2019-05-02 VITALS
TEMPERATURE: 98.6 F | BODY MASS INDEX: 27.06 KG/M2 | SYSTOLIC BLOOD PRESSURE: 106 MMHG | WEIGHT: 189 LBS | RESPIRATION RATE: 16 BRPM | HEART RATE: 94 BPM | DIASTOLIC BLOOD PRESSURE: 66 MMHG | OXYGEN SATURATION: 90 % | HEIGHT: 70 IN

## 2019-05-02 DIAGNOSIS — S27.309D ACUTE LUNG INJURY, SUBSEQUENT ENCOUNTER: ICD-10-CM

## 2019-05-02 DIAGNOSIS — J96.01 ACUTE RESPIRATORY FAILURE WITH HYPOXIA (HCC): ICD-10-CM

## 2019-05-02 DIAGNOSIS — J45.909 ASTHMA, UNSPECIFIED ASTHMA SEVERITY, UNSPECIFIED WHETHER COMPLICATED, UNSPECIFIED WHETHER PERSISTENT: ICD-10-CM

## 2019-05-02 PROCEDURE — 99214 OFFICE O/P EST MOD 30 MIN: CPT | Performed by: INTERNAL MEDICINE

## 2019-05-02 ASSESSMENT — ENCOUNTER SYMPTOMS
SINUS PAIN: 0
CLAUDICATION: 0
PALPITATIONS: 0
DIZZINESS: 0
EYE REDNESS: 0
DOUBLE VISION: 0
STRIDOR: 0
FOCAL WEAKNESS: 0
ABDOMINAL PAIN: 0
SPUTUM PRODUCTION: 0
BACK PAIN: 0
DIARRHEA: 0
MYALGIAS: 0
WEIGHT LOSS: 0
CHILLS: 0
DEPRESSION: 0
HEMOPTYSIS: 0
PND: 0
FEVER: 0
PHOTOPHOBIA: 0
SHORTNESS OF BREATH: 1
EYE DISCHARGE: 0
HEARTBURN: 0
CONSTIPATION: 0
HEADACHES: 0
BLURRED VISION: 0
DIAPHORESIS: 0
TREMORS: 0
ORTHOPNEA: 0
WEAKNESS: 0
NECK PAIN: 0
COUGH: 1
NAUSEA: 0
SORE THROAT: 0
EYE PAIN: 0
SPEECH CHANGE: 0
FALLS: 0
WHEEZING: 0
VOMITING: 0

## 2019-05-02 NOTE — PROGRESS NOTES
Chief Complaint   Patient presents with   • Establish Care     asthma, pneumonia, SOB  4/19/19  [facility]    • Results     Cytology 4/24/19, Ct HR 11/9/18        HPI: This patient is a 58 y.o. female presenting as a hospital f/u for evaluation of acute hypoxic respiratory failure 2/2 ALI presumed due to severe CAP.  The patient's past medical history is significant for hypertension, prediabetes currently controlled with diet, history of obesity treated with Kennedy-en-Y gastric bypass and more recently acute hypoxic respiratory failure in the setting of presumed community-acquired pneumonia.  The patient is a former smoker with a roughly 20-pack-year history having quit in 1994.  She drinks alcohol infrequently.  She has no known occupational or environmental exposures and no family history of allergic or rheumatologic lung disease.  The patient was recently hospitalized at [facility] for acute hypoxic respiratory failure after a recent trip to [place] with family were several other family members became ill although not the same severity.  She did require ICU admission with high flow nasal cannula oxygen due to severe hypoxia.  A CT chest obtained on admission showed no pulmonary embolism but did show extensive groundglass opacities bilaterally in both upper and lower lung fields but predominantly upper lobe.  An echocardiogram showed normal left ventricular ejection fraction of 65%.  A procalcitonin was elevated at 4.14 and she was initiated on antibiotic treatment.  She did have a mildly elevated BNP in the range of 300 and did undergo diuresis during her hospital stay.  Following diuresis the patient did undergo bronchoscopy on April 23 with a BAL showing no organisms, fluid was described as red and bloody with rare WBCs 100% polys.  She was treated with Rocephin and azithromycin and changed to oral Augmentin prior to discharge home.  She was also treated with prednisone and given 40 mg to take 5 days  upon discharge.  The patient did not require oxygen at the time of discharge who presents for follow-up today.  She does report that she had a significant cough prior to admission as well as initially upon discharge which has improved nearly completely.  She has been using her nebulizers as scheduled but has not needed them outside of scheduling them 2-3 times daily.  She is using her Flovent twice daily.  She denies shortness of breath or dyspnea on exertion but has not resumed normal activity as of yet.  She denies fevers, chills, night sweats, weight loss.  No joint pain, no dry eyes or dry mouth.  No hemoptysis.  Of note the patient was told earlier in 2018 during a routine exam by her primary care provider with [facility] that she had an abnormal lung exam.  She was given an albuterol inhaler although the patient denied respiratory symptoms at the time.  Subsequently she was admitted to [facility] in November for acute onset shortness of breath without associated infectious symptoms including fever chills or cough.  She was noted to be hypoxic and diagnosed with pneumonitis.  She states she was treated with antibiotics and followed up with pulmonary at [facility] where she was given a diagnosis of asthma after PFTs showed bronchodilator responsiveness.  She was tried on inhaled corticosteroids with L RUSLAN but did not tolerate the LABA and was subsequently placed on inhaled corticosteroid therapy alone.  She denies a history of asthma prior to the hospitalization in November 2018.  She denies need for use of her rescue bronchodilator leading up to her hospitalization at [facility] in April.    Past Medical History:   Diagnosis Date   • Asthma    • Hypertension        Social History     Social History   • Marital status:      Spouse name: N/A   • Number of children: N/A   • Years of education: N/A     Occupational History   • Not on file.     Social History Main Topics   • Smoking status: Former Smoker      Packs/day: 1.00     Years: 20.00     Types: Cigarettes     Quit date: 1/13/1994   • Smokeless tobacco: Never Used   • Alcohol use 0.6 - 1.2 oz/week     1 - 2 Shots of liquor per week      Comment: 1-2x per week, 1-2 drinks   • Drug use: No   • Sexual activity: Not on file     Other Topics Concern   • Not on file     Social History Narrative   • No narrative on file       Family History   Problem Relation Age of Onset   • Cancer Mother         breast   • Cancer Maternal Grandmother         breast   • Cancer Sister         breast   • Cancer Father    • Diabetes Father        Current Outpatient Prescriptions on File Prior to Visit   Medication Sig Dispense Refill   • ipratropium-albuterol (DUONEB) 0.5-2.5 (3) MG/3ML nebulizer solution 3 mL by Nebulization route 4 times a day. 30 Bullet 1   • fluticasone (FLOVENT HFA) 110 MCG/ACT Aerosol Inhale 2 Puffs by mouth every 12 hours. 1 Inhaler 1   • albuterol 108 (90 Base) MCG/ACT Aero Soln inhalation aerosol Inhale 2 Puffs by mouth every 6 hours as needed for Shortness of Breath.     • Omega-3 Fatty Acids (FISH OIL) 1000 MG Cap capsule Take 1,000 mg by mouth every day.     • vitamin D (CHOLECALCIFEROL) 1000 UNIT Tab Take 500 Units by mouth every day.     • therapeutic multivitamin-minerals (THERAGRAN-M) Tab Take 1 Tab by mouth every day.     • magnesium gluconate (MAG-G) 500 MG tablet Take 500 mg by mouth every day.     • cyanocobalamin (VITAMIN B-12) 500 MCG Tab Take 500 mcg by mouth every day.     • levothyroxine (SYNTHROID) 75 MCG Tab Take 88 mcg by mouth Every morning on an empty stomach.     • lisinopril (PRINIVIL) 20 MG TABS Take 20 mg by mouth every day.     • albuterol (PROVENTIL) 2.5mg/0.5ml Nebu Soln 0.5 mL by Nebulization route every 6 hours as needed for Shortness of Breath. 120 mL 1   • benzonatate (TESSALON) 100 MG Cap Take 1 Cap by mouth 3 times a day as needed for Cough. (Patient not taking: Reported on 5/2/2019) 60 Cap 0   • hydrocodone-acetaminophen (NORCO)  "5-325 MG TABS per tablet Take 1-2 Tabs by mouth every four hours as needed. (Patient not taking: Reported on 5/2/2019) 20 Tab 0     No current facility-administered medications on file prior to visit.        Allergies: Codeine and Sulfa drugs    ROS:   Review of Systems   Constitutional: Negative for chills, diaphoresis, fever, malaise/fatigue and weight loss.   HENT: Negative for congestion, ear discharge, ear pain, hearing loss, nosebleeds, sinus pain, sore throat and tinnitus.    Eyes: Negative for blurred vision, double vision, photophobia, pain, discharge and redness.   Respiratory: Positive for cough and shortness of breath. Negative for hemoptysis, sputum production, wheezing and stridor.    Cardiovascular: Negative for chest pain, palpitations, orthopnea, claudication, leg swelling and PND.   Gastrointestinal: Negative for abdominal pain, constipation, diarrhea, heartburn, nausea and vomiting.   Genitourinary: Negative for dysuria and urgency.   Musculoskeletal: Negative for back pain, falls, joint pain, myalgias and neck pain.   Skin: Negative for itching and rash.   Neurological: Negative for dizziness, tremors, speech change, focal weakness, weakness and headaches.   Endo/Heme/Allergies: Negative for environmental allergies.   Psychiatric/Behavioral: Negative for depression.       /66 (BP Location: Left arm, Patient Position: Sitting, BP Cuff Size: Adult)   Pulse 94   Temp 37 °C (98.6 °F) (Temporal)   Resp 16   Ht 1.778 m (5' 10\")   Wt 85.7 kg (189 lb)   SpO2 90%     Physical Exam:  Physical Exam   Constitutional: She is oriented to person, place, and time. She appears well-developed and well-nourished.   HENT:   Head: Normocephalic and atraumatic.   Left Ear: External ear normal.   Mouth/Throat: Oropharynx is clear and moist. No oropharyngeal exudate.   Eyes: Pupils are equal, round, and reactive to light. Conjunctivae and EOM are normal. No scleral icterus.   Neck: Neck supple. No JVD " present. No tracheal deviation present.   Cardiovascular: Normal rate, regular rhythm and normal heart sounds.  Exam reveals no gallop and no friction rub.    No murmur heard.  Pulmonary/Chest: Effort normal. No accessory muscle usage.   B/l inspiratory crackles throughout lung fields with intermittent inspiratory squeaks heard mainly in upper lobes; R>L   Abdominal: Soft. She exhibits no distension. There is no tenderness.   Musculoskeletal: Normal range of motion. She exhibits no edema, tenderness or deformity.   Lymphadenopathy:     She has no cervical adenopathy.   Neurological: She is alert and oriented to person, place, and time. No cranial nerve deficit. Gait normal.   Skin: Skin is warm and dry. No rash noted. No cyanosis. Nails show no clubbing.   Psychiatric: She has a normal mood and affect.       PFTs as reviewed by me personally: Pending    Imaging as reviewed by me personally: CT from 4/19 reviewed by me as per HPI as well as CXR from 4/19 and 4/23    TTE from 4/20 reviewed by me    Assessment:  1. Acute respiratory failure with hypoxia (HCC)  RHEUMATOID ARTHRITIS FACTOR    CCP-CYCLIC CITRULLINATED PEPTID    CHELLE IGG KRIS W/RFLX TO CHELLE IGG IFA    CT-CHEST (THORAX) W/O   2. Acute lung injury, subsequent encounter  RHEUMATOID ARTHRITIS FACTOR    CCP-CYCLIC CITRULLINATED PEPTID    CHELLE IGG KRIS W/RFLX TO CHELLE IGG IFA    CT-CHEST (THORAX) W/O   3. Asthma, unspecified asthma severity, unspecified whether complicated, unspecified whether persistent         Plan:  1. Acute and recurrent per pt w/hx of another hypoxic episode in November at [facility]. Presumably this is secondary to PNA c/b ALI however I cannot r/o underlying ILD given hx of abnormal exam prior to her initial presentation in November.  I need to obtain records to be sure.  She did have elevated BNP but normal TTE as well as elevated procalcitonin and poly predominance on BAL.  She was tx with abx, diuresis as well as prednisone so it is not  entirely clear what improved her sxs but BAL most s/o acute infx PNA.  I am requesting imaging and records from [facility] including PFTs and D/C summary as well as any add'l w/u done there as she was seen by pulmonary in f/u and dx with asthma which may have been post-infx RAD.  2. Based her severe hypoxia and imaging, she qualifies for ALI which we are presuming was related to acute infx.  However as per above she recovered with steroids, abx and diuresis so unclear as to exact dx. I will plan on repeating a CT after 6 weeks to allow effects of any infx PNA to resolve if present and obtain records from [facility] inpt and pulmonary in the meantime.  Given repeated presentation and hx of abnormal exam prior to November I will also send basic ILD screening serologies including CHELLE, RF and anti-CCP.  3. No hx of this prior to her November presentation.  Had PFTs at [facility].  Did not tolerate LABA due to hypertension.  We discussed trial off flovent to see her breathing worsens which she is amenable to and continue prn EDER; pt has nebulizer at home. She will call if sxs worsen o/w we will see her back to review studies and repeat CT in 6 weeks.    Return in about 6 weeks (around 6/13/2019) for f/u CT and labs.

## 2019-05-03 ENCOUNTER — HOSPITAL ENCOUNTER (OUTPATIENT)
Dept: RADIOLOGY | Facility: MEDICAL CENTER | Age: 59
End: 2019-05-03

## 2019-05-06 NOTE — DOCUMENTATION QUERY
[facility]                                                                       Query Response Note      PATIENT:               GINNA ROCHE  ACCT #:                  [id number]  MRN:                     [id number]  :                      1960  ADMIT DATE:       2019 3:06 AM  DISCH DATE:        2019 2:00 PM  RESPONDING  PROVIDER #:        [id number]           QUERY TEXT:    Per the  bronchoscopy report, BAL was performed on the lingula.    Can the site of the bronchoscopy procedure be further specified.     NOTE:  If an appropriate response is not listed below, please respond with a new note.        The patient's Clinical Indicators include:  Patient underwent bronchoscopy with BAL in .  ICD 10 requires considerable specificity in coding procedures. Please clarify the specific portion of the lingula accessed for the BAL.    Query created by: Nell John on 2019 5:15 PM    RESPONSE TEXT:    Lingula Bronchus          Electronically signed by:  LARISSA RUIZ MD 2019 6:40 AM

## 2019-05-15 ENCOUNTER — TELEPHONE (OUTPATIENT)
Dept: SCHEDULING | Facility: IMAGING CENTER | Age: 59
End: 2019-05-15

## 2019-05-21 NOTE — ASSESSMENT & PLAN NOTE
-Chest x-ray showing pulmonary edema and/or infiltrates.  -Completed 5d abx  -Question of ILD  -Bronch 4/23- showed severe bilateral PNA with inflammation s/p Diagnostic bronch w BAL & brush, therapeutic aspiration of mucus plugs LLL and RLL Transbronchial biopsy of RUL  -Continue prednisone 40  -Finished abx   Recommend Bilateral upper lid blepharoplasty. Medicare (discussed risks and benefits of sx. .. ).

## 2019-05-22 LAB
FUNGUS SPEC CULT: ABNORMAL
FUNGUS SPEC CULT: ABNORMAL
SIGNIFICANT IND 70042: ABNORMAL
SITE SITE: ABNORMAL
SOURCE SOURCE: ABNORMAL

## 2019-06-03 ENCOUNTER — HOSPITAL ENCOUNTER (OUTPATIENT)
Dept: RADIOLOGY | Facility: MEDICAL CENTER | Age: 59
End: 2019-06-03
Attending: INTERNAL MEDICINE
Payer: COMMERCIAL

## 2019-06-03 ENCOUNTER — HOSPITAL ENCOUNTER (OUTPATIENT)
Dept: LAB | Facility: MEDICAL CENTER | Age: 59
End: 2019-06-03
Attending: INTERNAL MEDICINE
Payer: COMMERCIAL

## 2019-06-03 DIAGNOSIS — S27.309D ACUTE LUNG INJURY, SUBSEQUENT ENCOUNTER: ICD-10-CM

## 2019-06-03 DIAGNOSIS — J96.01 ACUTE RESPIRATORY FAILURE WITH HYPOXIA (HCC): ICD-10-CM

## 2019-06-03 PROCEDURE — 86431 RHEUMATOID FACTOR QUANT: CPT

## 2019-06-03 PROCEDURE — 86200 CCP ANTIBODY: CPT

## 2019-06-03 PROCEDURE — 71250 CT THORAX DX C-: CPT

## 2019-06-03 PROCEDURE — 86038 ANTINUCLEAR ANTIBODIES: CPT

## 2019-06-03 PROCEDURE — 36415 COLL VENOUS BLD VENIPUNCTURE: CPT

## 2019-06-04 LAB — RHEUMATOID FACT SER IA-ACNC: 12 IU/ML (ref 0–14)

## 2019-06-05 LAB
CCP IGG SERPL-ACNC: 3 UNITS (ref 0–19)
NUCLEAR IGG SER QL IA: NORMAL

## 2019-06-13 ENCOUNTER — OFFICE VISIT (OUTPATIENT)
Dept: PULMONOLOGY | Facility: HOSPICE | Age: 59
End: 2019-06-13
Payer: COMMERCIAL

## 2019-06-13 VITALS
RESPIRATION RATE: 16 BRPM | HEART RATE: 83 BPM | SYSTOLIC BLOOD PRESSURE: 124 MMHG | HEIGHT: 70 IN | WEIGHT: 181 LBS | BODY MASS INDEX: 25.91 KG/M2 | TEMPERATURE: 98.2 F | DIASTOLIC BLOOD PRESSURE: 86 MMHG | OXYGEN SATURATION: 95 %

## 2019-06-13 DIAGNOSIS — J96.01 ACUTE RESPIRATORY FAILURE WITH HYPOXIA (HCC): ICD-10-CM

## 2019-06-13 DIAGNOSIS — S27.309D ACUTE LUNG INJURY, SUBSEQUENT ENCOUNTER: ICD-10-CM

## 2019-06-13 DIAGNOSIS — J45.909 ASTHMA, UNSPECIFIED ASTHMA SEVERITY, UNSPECIFIED WHETHER COMPLICATED, UNSPECIFIED WHETHER PERSISTENT: ICD-10-CM

## 2019-06-13 PROCEDURE — 99214 OFFICE O/P EST MOD 30 MIN: CPT | Performed by: INTERNAL MEDICINE

## 2019-06-13 RX ORDER — OMEPRAZOLE 20 MG/1
20 CAPSULE, DELAYED RELEASE ORAL DAILY
Status: ON HOLD | COMMUNITY
End: 2019-09-20 | Stop reason: SDUPTHER

## 2019-06-13 NOTE — PROGRESS NOTES
Chief Complaint   Patient presents with   • Follow-Up     last seen 5/2/19    • Results     Ct 6/3/19, labs 6/3/19          HPI: This patient is a 58 y.o. female whom is followed in our clinic for hypoxic respiratory failure following acute illness last seen by me on 5/2/19. The patient's past medical history is significant for hypertension, prediabetes currently controlled with diet, history of obesity treated with Kennedy-en-Y gastric bypass and more recently acute hypoxic respiratory failure in the setting of presumed community-acquired pneumonia.  The patient is a former smoker with a roughly 20-pack-year history having quit in 1994.  The patient was hospitalized in April at [facility] for acute hypoxic respiratory failure after a recent trip to [place] with family were several other family members became ill although not the same severity.  She did require ICU admission with high flow nasal cannula oxygen due to severe hypoxia.  A CT chest obtained on admission showed no pulmonary embolism but did show extensive groundglass opacities bilaterally in both upper and lower lung fields but predominantly upper lobe.  An echocardiogram showed normal left ventricular ejection fraction of 65%.  A procalcitonin was elevated at 4.14 and she was initiated on antibiotic treatment.  She did have a mildly elevated BNP in the range of 300 and did undergo diuresis during her hospital stay.  Following diuresis the patient did undergo bronchoscopy on April 23 with a BAL showing no organisms, fluid was described as red and bloody with rare WBCs 100% polys.  She was treated with Rocephin and azithromycin and changed to oral Augmentin prior to discharge home.  She was also treated with prednisone and given 40 mg to take 5 days upon discharge.  The patient did not require oxygen at the time of discharge.  I saw her in f/u and she was using flovent twice daily as well as as needed EDER via nebulizer which she was scheduling 2-3x  "daily. She had been told that she had an abnormal lung exam in the past and was given rescue inhaler although outside of a PNA in 11/2018 and her hospital stay in April, she denies chronic respiratory sxs.  She has had PFTs which show bronchodilator responsiveness but otherwise normal from Jan 2019.  We opted to d/c scheduled bronchodilators to assess how often she was \"needing\" them and obtain repeat CT chest.  She returns today for f/u, no longer taking flovent and has stopped EDER completely. She feels well, is no longer coughing and denies SOB. Her CT chest does not show ILD but does have a few residual, patchy areas of GGO which is markedly improved from her imaging in April from [facility].     Past Medical History:   Diagnosis Date   • Asthma    • Hypertension        Social History     Social History   • Marital status:      Spouse name: N/A   • Number of children: N/A   • Years of education: N/A     Occupational History   • Not on file.     Social History Main Topics   • Smoking status: Former Smoker     Packs/day: 1.00     Years: 20.00     Types: Cigarettes     Quit date: 1/13/1994   • Smokeless tobacco: Never Used   • Alcohol use 0.6 - 1.2 oz/week     1 - 2 Shots of liquor per week      Comment: 1-2x per week, 1-2 drinks   • Drug use: No   • Sexual activity: Not on file     Other Topics Concern   • Not on file     Social History Narrative   • No narrative on file       Family History   Problem Relation Age of Onset   • Cancer Mother         breast   • Cancer Maternal Grandmother         breast   • Cancer Sister         breast   • Cancer Father    • Diabetes Father        Current Outpatient Prescriptions on File Prior to Visit   Medication Sig Dispense Refill   • Omega-3 Fatty Acids (FISH OIL) 1000 MG Cap capsule Take 1,000 mg by mouth every day.     • vitamin D (CHOLECALCIFEROL) 1000 UNIT Tab Take 500 Units by mouth every day.     • therapeutic multivitamin-minerals (THERAGRAN-M) Tab Take 1 Tab by " mouth every day.     • magnesium gluconate (MAG-G) 500 MG tablet Take 500 mg by mouth every day.     • cyanocobalamin (VITAMIN B-12) 500 MCG Tab Take 500 mcg by mouth every day.     • levothyroxine (SYNTHROID) 75 MCG Tab Take 88 mcg by mouth Every morning on an empty stomach.     • lisinopril (PRINIVIL) 20 MG TABS Take 20 mg by mouth every day.     • ipratropium-albuterol (DUONEB) 0.5-2.5 (3) MG/3ML nebulizer solution 3 mL by Nebulization route 4 times a day. (Patient not taking: Reported on 6/13/2019) 30 Bullet 1   • albuterol (PROVENTIL) 2.5mg/0.5ml Nebu Soln 0.5 mL by Nebulization route every 6 hours as needed for Shortness of Breath. (Patient not taking: Reported on 6/13/2019) 120 mL 1   • fluticasone (FLOVENT HFA) 110 MCG/ACT Aerosol Inhale 2 Puffs by mouth every 12 hours. (Patient not taking: Reported on 6/13/2019) 1 Inhaler 1   • benzonatate (TESSALON) 100 MG Cap Take 1 Cap by mouth 3 times a day as needed for Cough. (Patient not taking: Reported on 5/2/2019) 60 Cap 0   • albuterol 108 (90 Base) MCG/ACT Aero Soln inhalation aerosol Inhale 2 Puffs by mouth every 6 hours as needed for Shortness of Breath.     • hydrocodone-acetaminophen (NORCO) 5-325 MG TABS per tablet Take 1-2 Tabs by mouth every four hours as needed. (Patient not taking: Reported on 5/2/2019) 20 Tab 0     No current facility-administered medications on file prior to visit.        Codeine and Sulfa drugs      ROS:   Review of Systems   Constitutional: Negative for chills, diaphoresis, fever, malaise/fatigue and weight loss.   HENT: Negative for congestion, ear discharge, ear pain, hearing loss, nosebleeds, sinus pain, sore throat and tinnitus.    Eyes: Negative for blurred vision, double vision, photophobia, pain, discharge and redness.   Respiratory: Negative for cough, hemoptysis, sputum production, shortness of breath, wheezing and stridor.    Cardiovascular: Negative for chest pain, palpitations, orthopnea, claudication, leg swelling and  "PND.   Gastrointestinal: Negative for abdominal pain, constipation, diarrhea, heartburn, nausea and vomiting.   Genitourinary: Negative for dysuria and urgency.   Musculoskeletal: Negative for back pain, falls, joint pain, myalgias and neck pain.   Skin: Negative for itching and rash.   Neurological: Negative for dizziness, tremors, speech change, focal weakness, weakness and headaches.   Endo/Heme/Allergies: Negative for environmental allergies.   Psychiatric/Behavioral: Negative for depression.       /86 (BP Location: Left arm, Patient Position: Sitting, BP Cuff Size: Adult)   Pulse 83   Temp 36.8 °C (98.2 °F) (Temporal)   Resp 16   Ht 1.778 m (5' 10\")   Wt 82.1 kg (181 lb)   SpO2 95%   Physical Exam   Constitutional: She is oriented to person, place, and time. She appears well-developed and well-nourished.   HENT:   Head: Normocephalic and atraumatic.   Left Ear: External ear normal.   Mouth/Throat: Oropharynx is clear and moist. No oropharyngeal exudate.   Eyes: Pupils are equal, round, and reactive to light. Conjunctivae and EOM are normal. No scleral icterus.   Neck: Neck supple. No JVD present. No tracheal deviation present.   Cardiovascular: Normal rate, regular rhythm and normal heart sounds.  Exam reveals no gallop and no friction rub.    No murmur heard.  Pulmonary/Chest: Effort normal. No accessory muscle usage. No respiratory distress. She has no wheezes. She has no rales.   B/l inspiratory squeaks   Abdominal: Soft. She exhibits no distension. There is no tenderness.   Musculoskeletal: Normal range of motion. She exhibits no edema, tenderness or deformity.   Lymphadenopathy:     She has no cervical adenopathy.   Neurological: She is alert and oriented to person, place, and time. No cranial nerve deficit. Gait normal.   Skin: Skin is warm and dry. No rash noted. No cyanosis. Nails show no clubbing.   Psychiatric: She has a normal mood and affect.       PFTs as reviewed by me personally:as " per HPI    Imaging as reviewed by me personally:  As per hpi    Assessment:  1. Acute respiratory failure with hypoxia (HCC)     2. Acute lung injury, subsequent encounter     3. Asthma, unspecified asthma severity, unspecified whether complicated, unspecified whether persistent         Plan:  1. Resolved. Given the pt presented in the past with abnormal exam and recurrent episodes of PNA requiring hospital stay, I would like to continue to observe her every 6 months or sooner if sxs recur.   2. Her CT and exam findings may all be related to ALI or ARDS however she does say she had an abnormal exam in the past.  CT chest is markedly improved as are clinical sxs and her PFTs were essentially normal in January.  As per above, continue observation.  3. She does have RAD by bronchodilator responsiveness but I am not convinced she has clinical asthma in need of long-term controller therapy.  We will continue as needed EDER. Follow up in 6 mos or sooner if needed.   Return in about 6 months (around 12/13/2019) for reactive airways disease.

## 2019-06-14 ASSESSMENT — ENCOUNTER SYMPTOMS
STRIDOR: 0
DIARRHEA: 0
WEIGHT LOSS: 0
SINUS PAIN: 0
PHOTOPHOBIA: 0
CONSTIPATION: 0
COUGH: 0
CLAUDICATION: 0
SPUTUM PRODUCTION: 0
FOCAL WEAKNESS: 0
EYE PAIN: 0
PALPITATIONS: 0
SORE THROAT: 0
CHILLS: 0
SHORTNESS OF BREATH: 0
HEMOPTYSIS: 0
NAUSEA: 0
PND: 0
FEVER: 0
HEADACHES: 0
EYE DISCHARGE: 0
SPEECH CHANGE: 0
BACK PAIN: 0
WHEEZING: 0
DOUBLE VISION: 0
EYE REDNESS: 0
MYALGIAS: 0
DEPRESSION: 0
ORTHOPNEA: 0
TREMORS: 0
NECK PAIN: 0
DIZZINESS: 0
DIAPHORESIS: 0
FALLS: 0
HEARTBURN: 0
WEAKNESS: 0
VOMITING: 0
ABDOMINAL PAIN: 0
BLURRED VISION: 0

## 2019-06-17 DIAGNOSIS — R05.9 COUGH: ICD-10-CM

## 2019-06-17 RX ORDER — FLUTICASONE PROPIONATE 110 UG/1
2 AEROSOL, METERED RESPIRATORY (INHALATION) EVERY 12 HOURS
Qty: 1 INHALER | Refills: 6 | Status: SHIPPED | OUTPATIENT
Start: 2019-06-17 | End: 2020-01-13

## 2019-06-18 LAB
MYCOBACTERIUM SPEC CULT: NORMAL
RHODAMINE-AURAMINE STN SPEC: NORMAL
SIGNIFICANT IND 70042: NORMAL
SITE SITE: NORMAL
SOURCE SOURCE: NORMAL

## 2019-07-18 ENCOUNTER — TELEPHONE (OUTPATIENT)
Dept: MEDICAL GROUP | Facility: MEDICAL CENTER | Age: 59
End: 2019-07-18

## 2019-07-18 ENCOUNTER — HOSPITAL ENCOUNTER (OUTPATIENT)
Dept: LAB | Facility: MEDICAL CENTER | Age: 59
End: 2019-07-18
Attending: NURSE PRACTITIONER
Payer: COMMERCIAL

## 2019-07-18 ENCOUNTER — OFFICE VISIT (OUTPATIENT)
Dept: MEDICAL GROUP | Facility: MEDICAL CENTER | Age: 59
End: 2019-07-18
Payer: COMMERCIAL

## 2019-07-18 VITALS
BODY MASS INDEX: 25.2 KG/M2 | HEART RATE: 92 BPM | SYSTOLIC BLOOD PRESSURE: 118 MMHG | WEIGHT: 176 LBS | OXYGEN SATURATION: 93 % | HEIGHT: 70 IN | TEMPERATURE: 97.8 F | DIASTOLIC BLOOD PRESSURE: 84 MMHG

## 2019-07-18 DIAGNOSIS — Z11.59 ENCOUNTER FOR HEPATITIS C SCREENING TEST FOR LOW RISK PATIENT: ICD-10-CM

## 2019-07-18 DIAGNOSIS — I10 ESSENTIAL HYPERTENSION: ICD-10-CM

## 2019-07-18 DIAGNOSIS — E89.0 POSTOPERATIVE HYPOTHYROIDISM: ICD-10-CM

## 2019-07-18 DIAGNOSIS — E78.2 MIXED HYPERLIPIDEMIA: ICD-10-CM

## 2019-07-18 DIAGNOSIS — E11.9 TYPE 2 DIABETES MELLITUS WITHOUT COMPLICATION, WITHOUT LONG-TERM CURRENT USE OF INSULIN (HCC): ICD-10-CM

## 2019-07-18 DIAGNOSIS — F51.01 PRIMARY INSOMNIA: ICD-10-CM

## 2019-07-18 DIAGNOSIS — Z80.3 FAMILY HISTORY OF BREAST CANCER: ICD-10-CM

## 2019-07-18 DIAGNOSIS — J45.40 MODERATE PERSISTENT ASTHMA WITHOUT COMPLICATION: ICD-10-CM

## 2019-07-18 PROBLEM — R73.9 HYPERGLYCEMIA: Status: RESOLVED | Noted: 2019-04-22 | Resolved: 2019-07-18

## 2019-07-18 PROBLEM — E87.20 LACTIC ACID ACIDOSIS: Status: RESOLVED | Noted: 2019-04-19 | Resolved: 2019-07-18

## 2019-07-18 PROBLEM — B37.0 THRUSH, ORAL: Status: RESOLVED | Noted: 2019-04-24 | Resolved: 2019-07-18

## 2019-07-18 PROBLEM — I24.89 DEMAND ISCHEMIA (HCC): Status: RESOLVED | Noted: 2019-04-19 | Resolved: 2019-07-18

## 2019-07-18 PROBLEM — J96.01 ACUTE RESPIRATORY FAILURE WITH HYPOXIA (HCC): Status: RESOLVED | Noted: 2019-04-19 | Resolved: 2019-07-18

## 2019-07-18 PROBLEM — J15.9 COMMUNITY ACQUIRED BACTERIAL PNEUMONIA: Status: RESOLVED | Noted: 2019-04-24 | Resolved: 2019-07-18

## 2019-07-18 PROBLEM — J96.01 ACUTE HYPOXEMIC RESPIRATORY FAILURE (HCC): Status: RESOLVED | Noted: 2019-04-22 | Resolved: 2019-07-18

## 2019-07-18 LAB
EST. AVERAGE GLUCOSE BLD GHB EST-MCNC: 131 MG/DL
HBA1C MFR BLD: 6.2 % (ref 0–5.6)
HCV AB SER QL: NEGATIVE

## 2019-07-18 PROCEDURE — 83036 HEMOGLOBIN GLYCOSYLATED A1C: CPT

## 2019-07-18 PROCEDURE — 86803 HEPATITIS C AB TEST: CPT

## 2019-07-18 PROCEDURE — 36415 COLL VENOUS BLD VENIPUNCTURE: CPT

## 2019-07-18 PROCEDURE — 92250 FUNDUS PHOTOGRAPHY W/I&R: CPT | Mod: TC | Performed by: NURSE PRACTITIONER

## 2019-07-18 PROCEDURE — 99214 OFFICE O/P EST MOD 30 MIN: CPT | Performed by: NURSE PRACTITIONER

## 2019-07-18 RX ORDER — ZALEPLON 10 MG/1
10 CAPSULE ORAL NIGHTLY PRN
Refills: 3 | COMMUNITY
Start: 2019-04-23 | End: 2019-07-18 | Stop reason: SDUPTHER

## 2019-07-18 RX ORDER — LEVOTHYROXINE SODIUM 88 UG/1
TABLET ORAL
Refills: 5 | COMMUNITY
Start: 2019-07-08 | End: 2019-10-13

## 2019-07-18 RX ORDER — ZALEPLON 10 MG/1
10 CAPSULE ORAL NIGHTLY PRN
Qty: 30 CAP | Refills: 3 | Status: SHIPPED
Start: 2019-07-18 | End: 2019-08-17

## 2019-07-18 ASSESSMENT — PATIENT HEALTH QUESTIONNAIRE - PHQ9: CLINICAL INTERPRETATION OF PHQ2 SCORE: 0

## 2019-07-18 NOTE — PROGRESS NOTES
Lillian Addison is a 58 y.o. female here to establish care and discuss the following:    HPI:   Postoperative hypothyroidism  Removed right lobe and right middle section due to large cyst which was benign. On levothyroxine 88 mcg    Type 2 diabetes mellitus without complication, without long-term current use of insulin (HCC)  Diet controlled. Did not tolerate metformin due to nerve symptoms.     Primary insomnia  Has been using Sonata 10 mg as needed, usually around once per week. Requesting refill today.     Moderate persistent asthma  Chronic. Stable on flovent and albuterol.     Current medicines (including changes today)  Current Outpatient Prescriptions   Medication Sig Dispense Refill   • zaleplon (SONATA) 10 MG capsule Take 1 Cap by mouth at bedtime as needed for up to 30 days. 30 Cap 3   • fluticasone (FLOVENT HFA) 110 MCG/ACT Aerosol Inhale 2 Puffs by mouth every 12 hours. 1 Inhaler 6   • omeprazole (PRILOSEC) 20 MG delayed-release capsule Take 20 mg by mouth every day.     • Omega-3 Fatty Acids (FISH OIL) 1000 MG Cap capsule Take 1,000 mg by mouth every day.     • vitamin D (CHOLECALCIFEROL) 1000 UNIT Tab Take 500 Units by mouth every day.     • therapeutic multivitamin-minerals (THERAGRAN-M) Tab Take 1 Tab by mouth every day.     • magnesium gluconate (MAG-G) 500 MG tablet Take 500 mg by mouth every day.     • cyanocobalamin (VITAMIN B-12) 500 MCG Tab Take 500 mcg by mouth every day.     • lisinopril (PRINIVIL) 20 MG TABS Take 20 mg by mouth every day.     • levothyroxine (SYNTHROID) 88 MCG Tab TAKE ONE TABLET BY MOUTH ONCE DAILY IN THE MORNING FOR THYROID. TAKE ON EMPTY STOMACH. DO NOT EAT FOR 30 MINUTES  5   • albuterol 108 (90 Base) MCG/ACT Aero Soln inhalation aerosol Inhale 2 Puffs by mouth every 6 hours as needed for Shortness of Breath.       No current facility-administered medications for this visit.      She  has a past medical history of Asthma; Diabetes (HCC); Hyperlipidemia; Hypertension;  "and Thyroid disease.  She  has a past surgical history that includes pr c-sec only,prev c-sec; bronchoscopy-endo (2019); bronchoscopy/ndl bx (2019); primary c section; and gastrectomy ().  Social History   Substance Use Topics   • Smoking status: Former Smoker     Packs/day: 1.00     Years: 20.00     Types: Cigarettes     Quit date: 1994   • Smokeless tobacco: Never Used   • Alcohol use 0.6 - 1.2 oz/week     1 - 2 Shots of liquor per week      Comment: 1-2x per week, 1-2 drinks     Social History     Social History Narrative   • No narrative on file     Family History   Problem Relation Age of Onset   • Cancer Mother 55        breast   • Heart Disease Mother         MI   • Cancer Maternal Grandmother         breast   • Cancer Sister         breast   • Diabetes Father    • Cancer Father         SCC lung     Family Status   Relation Status   • Mo         cardiac   • MGMo (Not Specified)   • Sis (Not Specified)   • Fa          ROS  No chest pain, no abdominal pain, no rash.  Positive ROS as per HPI.  All other systems reviewed and are negative      Objective:     /84   Pulse 92   Temp 36.6 °C (97.8 °F) (Temporal)   Ht 1.778 m (5' 10\")   Wt 79.8 kg (176 lb)   SpO2 93%  Body mass index is 25.25 kg/m².  Physical Exam:    Constitutional: Alert, no distress.  Skin: Warm, dry, good turgor, no rashes in visible areas.  Eye: Equal, round, conjunctiva clear, lids normal.  ENMT: Lips without lesions, good dentition  Neck: Trachea midline  Respiratory: Unlabored respiratory effort  Cardiovascular: No edema.  Psych: Alert and oriented x3, normal affect and mood.      Assessment and Plan:   The following treatment plan was discussed    1. Essential hypertension  Stable  Continue lisinopril daily    2. Postoperative hypothyroidism  Stable  Labs in 2019 stable  Continue levothyroxine 88 mcg daily    3. Type 2 diabetes mellitus without complication, without long-term current use of " insulin (HCC)  Stable, diet controlled  Patient did not tolerate metformin due to nerve pain/symptoms  Last A1c 6.6 in January  Check retinal eye exam  - HEMOGLOBIN A1C; Future  - POCT Retinal Eye Exam    4. Mixed hyperlipidemia  Stable  Continue fish oil  Recent labs show slightly elevated LDL at 108, otherwise normal    5. Family history of breast cancer  Up-to-date on mammogram  Patient's mother, sister, and grandmother all had breast cancer    6. Primary insomnia  Stable  Refilled Sonata  - zaleplon (SONATA) 10 MG capsule; Take 1 Cap by mouth at bedtime as needed for up to 30 days.  Dispense: 30 Cap; Refill: 3    7. Moderate persistent asthma without complication  Stable  Continue follow-up with pulmonology  Continue albuterol as needed    8. Encounter for hepatitis C screening test for low risk patient   - HEP C VIRUS ANTIBODY; Future      Records reviewed via media and paperwork patient brought into office.  Followup: Return in about 6 months (around 1/18/2020).    I have placed the below orders and discussed them with an approved delegating provider. The MA is performing the below orders under the direction of Dr. Franco

## 2019-07-18 NOTE — ASSESSMENT & PLAN NOTE
Removed right lobe and right middle section due to large cyst which was benign. On levothyroxine 88 mcg

## 2019-07-18 NOTE — TELEPHONE ENCOUNTER
Phone Number Called: [phone] (home)       Call outcome: spoke to patient regarding message below    Message: Patient notified of results.

## 2019-07-18 NOTE — TELEPHONE ENCOUNTER
----- Message from JENNY Orr sent at 7/18/2019  2:00 PM PDT -----  Please notify patient that her A1c was 6.2 which indicates good control of her diabetes.    Her hepatitis C screening was negative.    JENNY Orr

## 2019-07-26 LAB — RETINAL SCREEN: NEGATIVE

## 2019-08-06 ENCOUNTER — TELEPHONE (OUTPATIENT)
Dept: MEDICAL GROUP | Facility: MEDICAL CENTER | Age: 59
End: 2019-08-06

## 2019-08-06 NOTE — TELEPHONE ENCOUNTER
----- Message from JENNY Orr sent at 8/5/2019  6:17 PM PDT -----  Please notify patient that her retinal eye exam was normal.  We will repeat this in 1 year.    JENNY Orr

## 2019-08-06 NOTE — TELEPHONE ENCOUNTER
1. Caller Name: Lillian                                           Call Back Number: [phone] (home)         Patient approves a detailed voicemail message: N\A    Pt notified

## 2019-09-18 ENCOUNTER — HOSPITAL ENCOUNTER (OUTPATIENT)
Facility: MEDICAL CENTER | Age: 59
End: 2019-09-18
Payer: COMMERCIAL

## 2019-09-18 ENCOUNTER — OFFICE VISIT (OUTPATIENT)
Dept: PULMONOLOGY | Facility: HOSPICE | Age: 59
End: 2019-09-18
Payer: COMMERCIAL

## 2019-09-18 ENCOUNTER — HOSPITAL ENCOUNTER (OUTPATIENT)
Facility: MEDICAL CENTER | Age: 59
End: 2019-09-20
Attending: HOSPITALIST | Admitting: HOSPITALIST
Payer: COMMERCIAL

## 2019-09-18 VITALS
SYSTOLIC BLOOD PRESSURE: 124 MMHG | HEIGHT: 70 IN | BODY MASS INDEX: 24.91 KG/M2 | RESPIRATION RATE: 16 BRPM | OXYGEN SATURATION: 86 % | HEART RATE: 100 BPM | WEIGHT: 174 LBS | DIASTOLIC BLOOD PRESSURE: 76 MMHG

## 2019-09-18 DIAGNOSIS — J84.9 INTERSTITIAL LUNG DISEASE (HCC): ICD-10-CM

## 2019-09-18 DIAGNOSIS — S27.309D ACUTE LUNG INJURY, SUBSEQUENT ENCOUNTER: ICD-10-CM

## 2019-09-18 DIAGNOSIS — J96.01 ACUTE RESPIRATORY FAILURE WITH HYPOXIA (HCC): ICD-10-CM

## 2019-09-18 DIAGNOSIS — R05.9 COUGH: ICD-10-CM

## 2019-09-18 PROBLEM — J96.20 ACUTE ON CHRONIC RESPIRATORY FAILURE (HCC): Status: RESOLVED | Noted: 2019-04-19 | Resolved: 2019-07-18

## 2019-09-18 PROBLEM — J96.20 ACUTE ON CHRONIC RESPIRATORY FAILURE (HCC): Status: ACTIVE | Noted: 2019-04-19

## 2019-09-18 LAB
CRP SERPL HS-MCNC: 1.95 MG/DL (ref 0–0.75)
ERYTHROCYTE [SEDIMENTATION RATE] IN BLOOD BY WESTERGREN METHOD: 5 MM/HOUR (ref 0–30)
PROCALCITONIN SERPL-MCNC: <0.05 NG/ML

## 2019-09-18 PROCEDURE — 99214 OFFICE O/P EST MOD 30 MIN: CPT | Performed by: INTERNAL MEDICINE

## 2019-09-18 PROCEDURE — 86140 C-REACTIVE PROTEIN: CPT

## 2019-09-18 PROCEDURE — 700102 HCHG RX REV CODE 250 W/ 637 OVERRIDE(OP): Performed by: HOSPITALIST

## 2019-09-18 PROCEDURE — 700101 HCHG RX REV CODE 250: Performed by: HOSPITALIST

## 2019-09-18 PROCEDURE — 85652 RBC SED RATE AUTOMATED: CPT

## 2019-09-18 PROCEDURE — 84145 PROCALCITONIN (PCT): CPT

## 2019-09-18 PROCEDURE — G0378 HOSPITAL OBSERVATION PER HR: HCPCS

## 2019-09-18 PROCEDURE — 700111 HCHG RX REV CODE 636 W/ 250 OVERRIDE (IP): Performed by: HOSPITALIST

## 2019-09-18 PROCEDURE — 94640 AIRWAY INHALATION TREATMENT: CPT

## 2019-09-18 PROCEDURE — A9270 NON-COVERED ITEM OR SERVICE: HCPCS | Performed by: HOSPITALIST

## 2019-09-18 PROCEDURE — 94760 N-INVAS EAR/PLS OXIMETRY 1: CPT

## 2019-09-18 PROCEDURE — 99218 PR INITIAL OBSERVATION CARE,LEVL I: CPT | Performed by: HOSPITALIST

## 2019-09-18 RX ORDER — POLYETHYLENE GLYCOL 3350 17 G/17G
1 POWDER, FOR SOLUTION ORAL
Status: DISCONTINUED | OUTPATIENT
Start: 2019-09-18 | End: 2019-09-20 | Stop reason: HOSPADM

## 2019-09-18 RX ORDER — OMEPRAZOLE 20 MG/1
20 CAPSULE, DELAYED RELEASE ORAL DAILY
Status: DISCONTINUED | OUTPATIENT
Start: 2019-09-19 | End: 2019-09-20 | Stop reason: HOSPADM

## 2019-09-18 RX ORDER — AMOXICILLIN 250 MG
2 CAPSULE ORAL 2 TIMES DAILY
Status: DISCONTINUED | OUTPATIENT
Start: 2019-09-18 | End: 2019-09-20 | Stop reason: HOSPADM

## 2019-09-18 RX ORDER — ONDANSETRON 4 MG/1
4 TABLET, ORALLY DISINTEGRATING ORAL EVERY 4 HOURS PRN
Status: DISCONTINUED | OUTPATIENT
Start: 2019-09-18 | End: 2019-09-20 | Stop reason: HOSPADM

## 2019-09-18 RX ORDER — BISACODYL 10 MG
10 SUPPOSITORY, RECTAL RECTAL
Status: DISCONTINUED | OUTPATIENT
Start: 2019-09-18 | End: 2019-09-20 | Stop reason: HOSPADM

## 2019-09-18 RX ORDER — PROMETHAZINE HYDROCHLORIDE 25 MG/1
12.5-25 SUPPOSITORY RECTAL EVERY 4 HOURS PRN
Status: DISCONTINUED | OUTPATIENT
Start: 2019-09-18 | End: 2019-09-20 | Stop reason: HOSPADM

## 2019-09-18 RX ORDER — FLUTICASONE PROPIONATE 110 UG/1
2 AEROSOL, METERED RESPIRATORY (INHALATION) EVERY 12 HOURS
Status: DISCONTINUED | OUTPATIENT
Start: 2019-09-18 | End: 2019-09-20 | Stop reason: HOSPADM

## 2019-09-18 RX ORDER — LEVOTHYROXINE SODIUM 88 UG/1
88 TABLET ORAL
Status: DISCONTINUED | OUTPATIENT
Start: 2019-09-19 | End: 2019-09-20 | Stop reason: HOSPADM

## 2019-09-18 RX ORDER — ACETAMINOPHEN 325 MG/1
650 TABLET ORAL EVERY 6 HOURS PRN
Status: DISCONTINUED | OUTPATIENT
Start: 2019-09-18 | End: 2019-09-20 | Stop reason: HOSPADM

## 2019-09-18 RX ORDER — MAGNESIUM GLUCONATE 27 MG(500)
500 TABLET ORAL DAILY
Status: DISCONTINUED | OUTPATIENT
Start: 2019-09-19 | End: 2019-09-18

## 2019-09-18 RX ORDER — LISINOPRIL 20 MG/1
20 TABLET ORAL DAILY
Status: DISCONTINUED | OUTPATIENT
Start: 2019-09-19 | End: 2019-09-20 | Stop reason: HOSPADM

## 2019-09-18 RX ORDER — PROCHLORPERAZINE EDISYLATE 5 MG/ML
5-10 INJECTION INTRAMUSCULAR; INTRAVENOUS EVERY 4 HOURS PRN
Status: DISCONTINUED | OUTPATIENT
Start: 2019-09-18 | End: 2019-09-20 | Stop reason: HOSPADM

## 2019-09-18 RX ORDER — IPRATROPIUM BROMIDE AND ALBUTEROL SULFATE 2.5; .5 MG/3ML; MG/3ML
3 SOLUTION RESPIRATORY (INHALATION)
Status: DISCONTINUED | OUTPATIENT
Start: 2019-09-18 | End: 2019-09-18

## 2019-09-18 RX ORDER — IPRATROPIUM BROMIDE AND ALBUTEROL SULFATE 2.5; .5 MG/3ML; MG/3ML
3 SOLUTION RESPIRATORY (INHALATION)
Status: DISCONTINUED | OUTPATIENT
Start: 2019-09-18 | End: 2019-09-19 | Stop reason: ALTCHOICE

## 2019-09-18 RX ORDER — ENALAPRILAT 1.25 MG/ML
1.25 INJECTION INTRAVENOUS EVERY 6 HOURS PRN
Status: DISCONTINUED | OUTPATIENT
Start: 2019-09-18 | End: 2019-09-20 | Stop reason: HOSPADM

## 2019-09-18 RX ORDER — PROMETHAZINE HYDROCHLORIDE 25 MG/1
12.5-25 TABLET ORAL EVERY 4 HOURS PRN
Status: DISCONTINUED | OUTPATIENT
Start: 2019-09-18 | End: 2019-09-20 | Stop reason: HOSPADM

## 2019-09-18 RX ORDER — GUAIFENESIN/DEXTROMETHORPHAN 100-10MG/5
10 SYRUP ORAL EVERY 6 HOURS PRN
Status: DISCONTINUED | OUTPATIENT
Start: 2019-09-18 | End: 2019-09-20 | Stop reason: HOSPADM

## 2019-09-18 RX ORDER — ONDANSETRON 2 MG/ML
4 INJECTION INTRAMUSCULAR; INTRAVENOUS EVERY 4 HOURS PRN
Status: DISCONTINUED | OUTPATIENT
Start: 2019-09-18 | End: 2019-09-20 | Stop reason: HOSPADM

## 2019-09-18 RX ADMIN — FLUTICASONE PROPIONATE 220 MCG: 110 AEROSOL, METERED RESPIRATORY (INHALATION) at 21:05

## 2019-09-18 RX ADMIN — PREDNISONE 60 MG: 10 TABLET ORAL at 21:06

## 2019-09-18 RX ADMIN — IPRATROPIUM BROMIDE AND ALBUTEROL SULFATE 3 ML: .5; 3 SOLUTION RESPIRATORY (INHALATION) at 21:23

## 2019-09-18 ASSESSMENT — ENCOUNTER SYMPTOMS
CONSTIPATION: 0
DIARRHEA: 0
ORTHOPNEA: 0
ABDOMINAL PAIN: 0
VOMITING: 0
FEVER: 0
PHOTOPHOBIA: 0
DEPRESSION: 0
EYE REDNESS: 0
SINUS PAIN: 0
BACK PAIN: 0
FOCAL WEAKNESS: 0
SHORTNESS OF BREATH: 1
COUGH: 1
MYALGIAS: 0
EYE DISCHARGE: 0
DOUBLE VISION: 0
HEADACHES: 1
PND: 0
SPEECH CHANGE: 0
HEMOPTYSIS: 0
HEARTBURN: 0
SORE THROAT: 0
DIAPHORESIS: 0
WHEEZING: 0
CLAUDICATION: 0
WEAKNESS: 0
DIZZINESS: 0
FALLS: 0
NECK PAIN: 0
STRIDOR: 0
PALPITATIONS: 0
TREMORS: 0
BLURRED VISION: 0
NAUSEA: 0
EYE PAIN: 0
SPUTUM PRODUCTION: 1
CHILLS: 0
WEIGHT LOSS: 0

## 2019-09-18 ASSESSMENT — COPD QUESTIONNAIRES
DO YOU EVER COUGH UP ANY MUCUS OR PHLEGM?: YES, A FEW DAYS A WEEK OR MONTH
DURING THE PAST 4 WEEKS HOW MUCH DID YOU FEEL SHORT OF BREATH: MOST  OR ALL OF THE TIME
HAVE YOU SMOKED AT LEAST 100 CIGARETTES IN YOUR ENTIRE LIFE: YES
COPD SCREENING SCORE: 8

## 2019-09-18 ASSESSMENT — LIFESTYLE VARIABLES: EVER_SMOKED: YES

## 2019-09-19 ENCOUNTER — APPOINTMENT (OUTPATIENT)
Dept: RADIOLOGY | Facility: MEDICAL CENTER | Age: 59
End: 2019-09-19
Attending: INTERNAL MEDICINE
Payer: COMMERCIAL

## 2019-09-19 PROBLEM — J84.9 INTERSTITIAL LUNG DISEASE (HCC): Status: ACTIVE | Noted: 2019-09-19

## 2019-09-19 LAB
ALBUMIN SERPL BCP-MCNC: 3.3 G/DL (ref 3.2–4.9)
ALBUMIN/GLOB SERPL: 0.7 G/DL
ALP SERPL-CCNC: 53 U/L (ref 30–99)
ALT SERPL-CCNC: 14 U/L (ref 2–50)
ANION GAP SERPL CALC-SCNC: 13 MMOL/L (ref 0–11.9)
AST SERPL-CCNC: 14 U/L (ref 12–45)
BASOPHILS # BLD AUTO: 1 % (ref 0–1.8)
BASOPHILS # BLD: 0.1 K/UL (ref 0–0.12)
BILIRUB SERPL-MCNC: 0.4 MG/DL (ref 0.1–1.5)
BUN SERPL-MCNC: 9 MG/DL (ref 8–22)
CALCIUM SERPL-MCNC: 9.4 MG/DL (ref 8.4–10.2)
CHLORIDE SERPL-SCNC: 103 MMOL/L (ref 96–112)
CO2 SERPL-SCNC: 24 MMOL/L (ref 20–33)
CREAT SERPL-MCNC: 0.67 MG/DL (ref 0.5–1.4)
EOSINOPHIL # BLD AUTO: 0.01 K/UL (ref 0–0.51)
EOSINOPHIL NFR BLD: 0.1 % (ref 0–6.9)
ERYTHROCYTE [DISTWIDTH] IN BLOOD BY AUTOMATED COUNT: 50.8 FL (ref 35.9–50)
GLOBULIN SER CALC-MCNC: 4.9 G/DL (ref 1.9–3.5)
GLUCOSE SERPL-MCNC: 155 MG/DL (ref 65–99)
HCT VFR BLD AUTO: 50.3 % (ref 37–47)
HGB BLD-MCNC: 16.8 G/DL (ref 12–16)
IMM GRANULOCYTES # BLD AUTO: 0.03 K/UL (ref 0–0.11)
IMM GRANULOCYTES NFR BLD AUTO: 0.3 % (ref 0–0.9)
LYMPHOCYTES # BLD AUTO: 0.46 K/UL (ref 1–4.8)
LYMPHOCYTES NFR BLD: 4.7 % (ref 22–41)
MCH RBC QN AUTO: 29.6 PG (ref 27–33)
MCHC RBC AUTO-ENTMCNC: 33.4 G/DL (ref 33.6–35)
MCV RBC AUTO: 88.7 FL (ref 81.4–97.8)
MONOCYTES # BLD AUTO: 0.12 K/UL (ref 0–0.85)
MONOCYTES NFR BLD AUTO: 1.2 % (ref 0–13.4)
NEUTROPHILS # BLD AUTO: 9.1 K/UL (ref 2–7.15)
NEUTROPHILS NFR BLD: 92.7 % (ref 44–72)
NRBC # BLD AUTO: 0 K/UL
NRBC BLD-RTO: 0 /100 WBC
NT-PROBNP SERPL IA-MCNC: 242 PG/ML (ref 0–125)
PLATELET # BLD AUTO: 314 K/UL (ref 164–446)
PMV BLD AUTO: 9.1 FL (ref 9–12.9)
POTASSIUM SERPL-SCNC: 4.1 MMOL/L (ref 3.6–5.5)
PROT SERPL-MCNC: 8.2 G/DL (ref 6–8.2)
RBC # BLD AUTO: 5.67 M/UL (ref 4.2–5.4)
RHEUMATOID FACT SER IA-ACNC: 14 IU/ML (ref 0–14)
SODIUM SERPL-SCNC: 140 MMOL/L (ref 135–145)
WBC # BLD AUTO: 9.8 K/UL (ref 4.8–10.8)

## 2019-09-19 PROCEDURE — G0378 HOSPITAL OBSERVATION PER HR: HCPCS

## 2019-09-19 PROCEDURE — 80053 COMPREHEN METABOLIC PANEL: CPT

## 2019-09-19 PROCEDURE — 86431 RHEUMATOID FACTOR QUANT: CPT

## 2019-09-19 PROCEDURE — 86038 ANTINUCLEAR ANTIBODIES: CPT

## 2019-09-19 PROCEDURE — 700101 HCHG RX REV CODE 250: Performed by: HOSPITALIST

## 2019-09-19 PROCEDURE — 94640 AIRWAY INHALATION TREATMENT: CPT

## 2019-09-19 PROCEDURE — 700111 HCHG RX REV CODE 636 W/ 250 OVERRIDE (IP): Performed by: HOSPITALIST

## 2019-09-19 PROCEDURE — 71046 X-RAY EXAM CHEST 2 VIEWS: CPT

## 2019-09-19 PROCEDURE — 700102 HCHG RX REV CODE 250 W/ 637 OVERRIDE(OP): Performed by: HOSPITALIST

## 2019-09-19 PROCEDURE — 86200 CCP ANTIBODY: CPT

## 2019-09-19 PROCEDURE — 83880 ASSAY OF NATRIURETIC PEPTIDE: CPT

## 2019-09-19 PROCEDURE — 99226 PR SUBSEQUENT OBSERVATION CARE,LEVEL III: CPT | Performed by: HOSPITALIST

## 2019-09-19 PROCEDURE — A9270 NON-COVERED ITEM OR SERVICE: HCPCS | Performed by: HOSPITALIST

## 2019-09-19 PROCEDURE — 86235 NUCLEAR ANTIGEN ANTIBODY: CPT | Mod: 91

## 2019-09-19 PROCEDURE — 36415 COLL VENOUS BLD VENIPUNCTURE: CPT

## 2019-09-19 PROCEDURE — 99215 OFFICE O/P EST HI 40 MIN: CPT | Performed by: INTERNAL MEDICINE

## 2019-09-19 PROCEDURE — 85025 COMPLETE CBC W/AUTO DIFF WBC: CPT

## 2019-09-19 PROCEDURE — 94760 N-INVAS EAR/PLS OXIMETRY 1: CPT

## 2019-09-19 RX ORDER — IPRATROPIUM BROMIDE AND ALBUTEROL SULFATE 2.5; .5 MG/3ML; MG/3ML
3 SOLUTION RESPIRATORY (INHALATION)
Status: DISCONTINUED | OUTPATIENT
Start: 2019-09-19 | End: 2019-09-19

## 2019-09-19 RX ORDER — IPRATROPIUM BROMIDE AND ALBUTEROL SULFATE 2.5; .5 MG/3ML; MG/3ML
3 SOLUTION RESPIRATORY (INHALATION)
Status: DISCONTINUED | OUTPATIENT
Start: 2019-09-19 | End: 2019-09-20 | Stop reason: HOSPADM

## 2019-09-19 RX ORDER — LEVALBUTEROL INHALATION SOLUTION 0.63 MG/3ML
0.63 SOLUTION RESPIRATORY (INHALATION)
Status: DISCONTINUED | OUTPATIENT
Start: 2019-09-19 | End: 2019-09-19 | Stop reason: ALTCHOICE

## 2019-09-19 RX ADMIN — LEVALBUTEROL HYDROCHLORIDE 0.63 MG: 0.63 SOLUTION RESPIRATORY (INHALATION) at 07:13

## 2019-09-19 RX ADMIN — FLUTICASONE PROPIONATE 220 MCG: 110 AEROSOL, METERED RESPIRATORY (INHALATION) at 17:49

## 2019-09-19 RX ADMIN — LEVOTHYROXINE SODIUM 88 MCG: 88 TABLET ORAL at 05:44

## 2019-09-19 RX ADMIN — IPRATROPIUM BROMIDE AND ALBUTEROL SULFATE 3 ML: .5; 3 SOLUTION RESPIRATORY (INHALATION) at 19:30

## 2019-09-19 RX ADMIN — IPRATROPIUM BROMIDE AND ALBUTEROL SULFATE 3 ML: .5; 3 SOLUTION RESPIRATORY (INHALATION) at 15:11

## 2019-09-19 RX ADMIN — IPRATROPIUM BROMIDE AND ALBUTEROL SULFATE 3 ML: .5; 3 SOLUTION RESPIRATORY (INHALATION) at 02:22

## 2019-09-19 RX ADMIN — PREDNISONE 60 MG: 10 TABLET ORAL at 17:58

## 2019-09-19 RX ADMIN — IPRATROPIUM BROMIDE AND ALBUTEROL SULFATE 3 ML: .5; 3 SOLUTION RESPIRATORY (INHALATION) at 11:02

## 2019-09-19 RX ADMIN — LISINOPRIL 20 MG: 20 TABLET ORAL at 05:43

## 2019-09-19 RX ADMIN — OMEPRAZOLE 20 MG: 20 CAPSULE, DELAYED RELEASE ORAL at 05:44

## 2019-09-19 RX ADMIN — FLUTICASONE PROPIONATE 220 MCG: 110 AEROSOL, METERED RESPIRATORY (INHALATION) at 05:43

## 2019-09-19 ASSESSMENT — PATIENT HEALTH QUESTIONNAIRE - PHQ9
SUM OF ALL RESPONSES TO PHQ9 QUESTIONS 1 AND 2: 0
1. LITTLE INTEREST OR PLEASURE IN DOING THINGS: NOT AT ALL
2. FEELING DOWN, DEPRESSED, IRRITABLE, OR HOPELESS: NOT AT ALL

## 2019-09-19 ASSESSMENT — COGNITIVE AND FUNCTIONAL STATUS - GENERAL
DAILY ACTIVITIY SCORE: 24
SUGGESTED CMS G CODE MODIFIER DAILY ACTIVITY: CH
SUGGESTED CMS G CODE MODIFIER MOBILITY: CH
MOBILITY SCORE: 24

## 2019-09-19 NOTE — ASSESSMENT & PLAN NOTE
Worsening hypoxia and coughing in the setting of possible RAD. Will get a sputum culture and repeat a high res ct of the chest given her history earlier this year.  High res ct delayed due to scanner being down  Order barium swallow due to history of sol en y surgery may be having chronic reflux  Home oxygen orders placed  Pulmonology  Consult requested discussed with dr. Richard

## 2019-09-19 NOTE — PROGRESS NOTES
Direct admit from MD office. Accepted by Dr. RAMAN culp for hypoxia.  ADT signed & held, needs to be released upon pt arrival.  Please page the on call direct admit hospitalist when patient arrives.  Pt coming by POV.

## 2019-09-19 NOTE — THERAPY
Orders received for clinical swallow evaluation, however per MD, pt is discharging home soon and does not need eval.  Please reconsult SLP if there is a change in pt's status.  Thank you.

## 2019-09-19 NOTE — PROGRESS NOTES
Chief Complaint   Patient presents with   • Follow-Up     Last Seen 6/13/19         HPI: This patient is a 59 y.o. female whom is followed in our clinic for hypoxic respiratory failure and presumed RAD last seen by me on 6/13/19. The patient's past medical history is significant for hypertension, prediabetes currently controlled with diet, history of obesity treated with Kennedy-en-Y gastric bypass and more recently acute hypoxic respiratory failure in the setting of presumed community-acquired pneumonia.  The patient is a former smoker with a roughly 20-pack-year history having quit in 1994.  The patient was hospitalized in April at [facility] for acute hypoxic respiratory failure after a recent trip to [place] with family were several other family members became ill although not the same severity.  She did require ICU admission with high flow nasal cannula oxygen due to severe hypoxia.  A CT chest obtained on admission showed no pulmonary embolism but did show extensive groundglass opacities bilaterally in both upper and lower lung fields but predominantly upper lobe.  An echocardiogram showed normal left ventricular ejection fraction of 65%.  A procalcitonin was elevated at 4.14 and she was initiated on antibiotic treatment.  She did have a mildly elevated BNP in the range of 300 and did undergo diuresis during her hospital stay.  Following diuresis the patient had bronchoscopy on April 23 with a BAL showing no organisms, fluid was described as red and bloody with rare WBCs 100% polys.  She was treated with Rocephin and azithromycin and changed to oral Augmentin prior to discharge home.  She was also treated with prednisone and given 40 mg to take 5 days upon discharge.  The patient did not require oxygen at the time of discharge.  I saw her in f/u and she was using flovent twice daily as well as as needed EDER via nebulizer. She had been told that she had an abnormal lung exam in the past and was given rescue  inhaler although outside of a PNA in 2018 and her hospital stay in April, she denies chronic respiratory sxs.  She has had PFTs which show bronchodilator responsiveness but otherwise normal from 2019.  We repeated a CT chest in  which showed near resolution of her GGO despite a persistently abnormal exam.  At our last apt she was no longer requiring supplemental O2 with improved CT so we planned to continue flovent and EDER.  She began having increased cough and chest congestion roughly 10 days ago and had stopped flovent.  I recommended she resume flovent and start flonase as well as OTC for upper airway congestion.  Pt sxs continued despite therapy and her SaO2 began to drop so we arranged for her to be seen today where he lung exam is markedly abnormal and SaO2 is 86% on RA at rest.  Pt denies f/c, chest pain but does endorse SOB, cough productive of white to yellow sputum and mild upper airway congestion.     Past Medical History:   Diagnosis Date   • Asthma    • Diabetes (HCC)    • Hyperlipidemia    • Hypertension    • Thyroid disease        Social History     Socioeconomic History   • Marital status:      Spouse name: Not on file   • Number of children: Not on file   • Years of education: Not on file   • Highest education level: Not on file   Occupational History   • Not on file   Social Needs   • Financial resource strain: Not on file   • Food insecurity:     Worry: Not on file     Inability: Not on file   • Transportation needs:     Medical: Not on file     Non-medical: Not on file   Tobacco Use   • Smoking status: Former Smoker     Packs/day: 1.00     Years: 20.00     Pack years: 20.00     Types: Cigarettes     Last attempt to quit: 1994     Years since quittin.6   • Smokeless tobacco: Never Used   Substance and Sexual Activity   • Alcohol use: Yes     Alcohol/week: 0.6 - 1.2 oz     Types: 1 - 2 Shots of liquor per week     Comment: 1-2x per week, 1-2 drinks   • Drug use: Yes      Types: Marijuana     Comment: edibled   • Sexual activity: Not on file   Lifestyle   • Physical activity:     Days per week: Not on file     Minutes per session: Not on file   • Stress: Not on file   Relationships   • Social connections:     Talks on phone: Not on file     Gets together: Not on file     Attends Jainism service: Not on file     Active member of club or organization: Not on file     Attends meetings of clubs or organizations: Not on file     Relationship status: Not on file   • Intimate partner violence:     Fear of current or ex partner: Not on file     Emotionally abused: Not on file     Physically abused: Not on file     Forced sexual activity: Not on file   Other Topics Concern   • Not on file   Social History Narrative   • Not on file       Family History   Problem Relation Age of Onset   • Cancer Mother 55        breast   • Heart Disease Mother         MI   • Cancer Maternal Grandmother         breast   • Cancer Sister         breast   • Diabetes Father    • Cancer Father         SCC lung       Current Outpatient Medications on File Prior to Visit   Medication Sig Dispense Refill   • levothyroxine (SYNTHROID) 88 MCG Tab TAKE ONE TABLET BY MOUTH ONCE DAILY IN THE MORNING FOR THYROID. TAKE ON EMPTY STOMACH. DO NOT EAT FOR 30 MINUTES  5   • fluticasone (FLOVENT HFA) 110 MCG/ACT Aerosol Inhale 2 Puffs by mouth every 12 hours. 1 Inhaler 6   • omeprazole (PRILOSEC) 20 MG delayed-release capsule Take 20 mg by mouth every day.     • albuterol 108 (90 Base) MCG/ACT Aero Soln inhalation aerosol Inhale 2 Puffs by mouth every 6 hours as needed for Shortness of Breath.     • Omega-3 Fatty Acids (FISH OIL) 1000 MG Cap capsule Take 1,000 mg by mouth every day.     • vitamin D (CHOLECALCIFEROL) 1000 UNIT Tab Take 500 Units by mouth every day.     • therapeutic multivitamin-minerals (THERAGRAN-M) Tab Take 1 Tab by mouth every day.     • magnesium gluconate (MAG-G) 500 MG tablet Take 500 mg by mouth every day.   "   • cyanocobalamin (VITAMIN B-12) 500 MCG Tab Take 500 mcg by mouth every day.     • lisinopril (PRINIVIL) 20 MG TABS Take 20 mg by mouth every day.       No current facility-administered medications on file prior to visit.        Codeine and Sulfa drugs      ROS:   Review of Systems   Constitutional: Negative for chills, diaphoresis, fever, malaise/fatigue and weight loss.   HENT: Positive for congestion. Negative for ear discharge, ear pain, hearing loss, nosebleeds, sinus pain, sore throat and tinnitus.    Eyes: Negative for blurred vision, double vision, photophobia, pain, discharge and redness.   Respiratory: Positive for cough, sputum production and shortness of breath. Negative for hemoptysis, wheezing and stridor.    Cardiovascular: Negative for chest pain, palpitations, orthopnea, claudication, leg swelling and PND.   Gastrointestinal: Negative for abdominal pain, constipation, diarrhea, heartburn, nausea and vomiting.   Genitourinary: Negative for dysuria and urgency.   Musculoskeletal: Negative for back pain, falls, joint pain, myalgias and neck pain.   Skin: Negative for itching and rash.   Neurological: Positive for headaches. Negative for dizziness, tremors, speech change, focal weakness and weakness.   Endo/Heme/Allergies: Negative for environmental allergies.   Psychiatric/Behavioral: Negative for depression.       /76 (BP Location: Left arm, Patient Position: Sitting, BP Cuff Size: Adult)   Pulse 100   Resp 16   Ht 1.778 m (5' 10\")   Wt 78.9 kg (174 lb)   SpO2 (!) 86%   Physical Exam   Constitutional: She is oriented to person, place, and time. She appears well-developed and well-nourished.   HENT:   Head: Normocephalic and atraumatic.   Right Ear: External ear normal.   Left Ear: External ear normal.   Mouth/Throat: Oropharynx is clear and moist. No oropharyngeal exudate.   Eyes: Pupils are equal, round, and reactive to light. Conjunctivae and EOM are normal. No scleral icterus.   Neck: " Normal range of motion. Neck supple. No JVD present. No tracheal deviation present.   Cardiovascular: Normal rate, regular rhythm and normal heart sounds. Exam reveals no gallop and no friction rub.   No murmur heard.  Pulmonary/Chest: Effort normal. No accessory muscle usage. No respiratory distress. She has no wheezes. She has rales.   Inspiratory rales b/l throughout with intermittent squeaks   Abdominal: Soft. She exhibits no distension. There is no tenderness.   Musculoskeletal: Normal range of motion. She exhibits no edema, tenderness or deformity.   Lymphadenopathy:     She has no cervical adenopathy.   Neurological: She is alert and oriented to person, place, and time. No cranial nerve deficit. Gait normal.   Skin: Skin is warm and dry. No rash noted. No cyanosis. Nails show no clubbing.   Psychiatric: She has a normal mood and affect.       PFTs as reviewed by me personally: as per HPI    Imaging as reviewed by me personally:  As per HPI    Assessment:  1. Acute respiratory failure with hypoxia (HCC)     2. Acute lung injury, subsequent encounter     3. Cough         Plan:  This is recurrent and cause as yet to be determined.  While infx is certainly still a possibility and I suppose aspiration or reflux could be contributing given her hx of gastric bypass, her findings on CT chest from April are fairly diffuse and her slow onset of sxs atypical for this.  I am recommending admission for repeat HRCT and depending on findings, I would consider repeat bronchoscopy for clx, cell ct and ddx. She does not appear to be grossly volume overloaded but she was diuresed her last admission so I would check BNP.  Her RF and CCP have been negative in the past however pending CT would consider add'l CTD w/u.  I would attempt to obtain sputum clx prior to abx if possible but certainly would not hold abx or steroids if unable to produce sputum on admission given severity of hypoxia.    Return in about 4 weeks (around  10/16/2019) for respiratory failure.

## 2019-09-19 NOTE — CONSULTS
"Pulmonary  Consultation    Date of consult: 9/19/2019    Referring Physician  Doreen Bass M.D.    Reason for Consultation  SOB     History of Presenting Illness  59 y.o. female who presented 9/18/2019 with SOB and hypoxemia actually the patient was sent from the pulmonary clinic she was there to see Dr. Pitt who sent her to the hospital secondary to shortness of breath and hypoxemia.  Please see Dr. Pitt excellent detailed note about the patient's recent pulmonary history outlined below.    \"HPI: This patient is a 59 y.o. female whom is followed in our clinic for hypoxic respiratory failure and presumed RAD last seen by me on 6/13/19. The patient's past medical history is significant for hypertension, prediabetes currently controlled with diet, history of obesity treated with Kennedy-en-Y gastric bypass and more recently acute hypoxic respiratory failure in the setting of presumed community-acquired pneumonia.  The patient is a former smoker with a roughly 20-pack-year history having quit in 1994.  The patient was hospitalized in April at [facility] for acute hypoxic respiratory failure after a recent trip to [place] with family were several other family members became ill although not the same severity.  She did require ICU admission with high flow nasal cannula oxygen due to severe hypoxia.  A CT chest obtained on admission showed no pulmonary embolism but did show extensive groundglass opacities bilaterally in both upper and lower lung fields but predominantly upper lobe.  An echocardiogram showed normal left ventricular ejection fraction of 65%.  A procalcitonin was elevated at 4.14 and she was initiated on antibiotic treatment.  She did have a mildly elevated BNP in the range of 300 and did undergo diuresis during her hospital stay.  Following diuresis the patient had bronchoscopy on April 23 with a BAL showing no organisms, fluid was described as red and bloody with rare WBCs 100% polys.  She was " "treated with Rocephin and azithromycin and changed to oral Augmentin prior to discharge home.  She was also treated with prednisone and given 40 mg to take 5 days upon discharge.  The patient did not require oxygen at the time of discharge.  I saw her in f/u and she was using flovent twice daily as well as as needed EDER via nebulizer. She had been told that she had an abnormal lung exam in the past and was given rescue inhaler although outside of a PNA in 11/2018 and her hospital stay in April, she denies chronic respiratory sxs.  She has had PFTs which show bronchodilator responsiveness but otherwise normal from Jan 2019.  We repeated a CT chest in June which showed near resolution of her GGO despite a persistently abnormal exam.  At our last apt she was no longer requiring supplemental O2 with improved CT so we planned to continue flovent and EDER.  She began having increased cough and chest congestion roughly 10 days ago and had stopped flovent.  I recommended she resume flovent and start flonase as well as OTC for upper airway congestion.  Pt sxs continued despite therapy and her SaO2 began to drop so we arranged for her to be seen today where he lung exam is markedly abnormal and SaO2 is 86% on RA at rest.  Pt denies f/c, chest pain but does endorse SOB, cough productive of white to yellow sputum and mild upper airway congestion\"    The patient seen today, she continues to have shortness of breath cough and continues to have abnormal pulmonary exam as mentioned below, she denies any fever.  No sick contact no recent travel and no change in her living circumstances  Code Status  Full Code    Review of Systems  ROS  Constitutional: Negative for chills, diaphoresis, fever, malaise/fatigue and weight loss.   HENT: Positive for congestion. Negative for ear discharge, ear pain, hearing loss, nosebleeds, sinus pain, sore throat and tinnitus.    Eyes: Negative for blurred vision, double vision, photophobia, pain, " discharge and redness.   Respiratory: Positive for cough, sputum production and shortness of breath. Negative for hemoptysis, wheezing and stridor.    Cardiovascular: Negative for chest pain, palpitations, orthopnea, claudication, leg swelling and PND.   Gastrointestinal: Negative for abdominal pain, constipation, diarrhea, heartburn, nausea and vomiting.   Genitourinary: Negative for dysuria and urgency.   Musculoskeletal: Negative for back pain, falls, joint pain, myalgias and neck pain.   Skin: Negative for itching and rash.   Neurological: Positive for headaches. Negative for dizziness, tremors, speech change, focal weakness and weakness.   Endo/Heme/Allergies: Negative for environmental allergies.   Psychiatric/Behavioral: Negative for depression.   Past Medical History   has a past medical history of Asthma, Diabetes (HCC), Hyperlipidemia, Hypertension, and Thyroid disease.    Surgical History   has a past surgical history that includes pr c-sec only,prev c-sec; bronchoscopy-endo (4/23/2019); bronchoscopy/ndl bx (4/23/2019); primary c section; and gastrectomy (2006).    Family History  family history includes Cancer in her father, maternal grandmother, and sister; Cancer (age of onset: 55) in her mother; Diabetes in her father; Heart Disease in her mother.    Social History   reports that she quit smoking about 25 years ago. Her smoking use included cigarettes. She has a 20.00 pack-year smoking history. She has never used smokeless tobacco. She reports that she drinks about 0.6 - 1.2 oz of alcohol per week. She reports that she has current or past drug history. Drug: Marijuana.    Medications  Home Medications    **Home medications have not yet been reviewed for this encounter**       Current Facility-Administered Medications   Medication Dose Route Frequency Provider Last Rate Last Dose   • ipratropium-albuterol (DUONEB) nebulizer solution  3 mL Nebulization 4X/DAY (RT) Doreen Bass M.D.   3 mL at 09/19/19  1511   • fluticasone (FLOVENT HFA) 110 MCG/ACT inhaler 220 mcg  2 Puff Inhalation Q12HRS Austin Bass M.D.   220 mcg at 09/19/19 0543   • levothyroxine (SYNTHROID) tablet 88 mcg  88 mcg Oral AM ES Austin Bass M.D.   88 mcg at 09/19/19 0544   • lisinopril (PRINIVIL) tablet 20 mg  20 mg Oral DAILY Austin Bass M.D.   20 mg at 09/19/19 0543   • omeprazole (PRILOSEC) capsule 20 mg  20 mg Oral DAILY Austin Bass M.D.   20 mg at 09/19/19 0544   • senna-docusate (PERICOLACE or SENOKOT S) 8.6-50 MG per tablet 2 Tab  2 Tab Oral BID Austin Bass M.D.        And   • polyethylene glycol/lytes (MIRALAX) PACKET 1 Packet  1 Packet Oral QDAY PRN Austin Bass M.D.        And   • magnesium hydroxide (MILK OF MAGNESIA) suspension 30 mL  30 mL Oral QDAY PRN Austin Bass M.D.        And   • bisacodyl (DULCOLAX) suppository 10 mg  10 mg Rectal QDAY PRN Austin Bass M.D.       • Respiratory Care per Protocol   Nebulization Continuous RT Austin Bass M.D.       • Respiratory Care per Protocol   Nebulization Continuous RT Austin Bass M.D.       • acetaminophen (TYLENOL) tablet 650 mg  650 mg Oral Q6HRS PRN Austin Bass M.D.       • enalaprilat (VASOTEC) injection 1.25 mg  1.25 mg Intravenous Q6HRS PRN Austin Bass M.D.       • ondansetron (ZOFRAN) syringe/vial injection 4 mg  4 mg Intravenous Q4HRS PRN Austin Bass M.D.       • ondansetron (ZOFRAN ODT) dispertab 4 mg  4 mg Oral Q4HRS PRN Austin Bass M.D.       • promethazine (PHENERGAN) tablet 12.5-25 mg  12.5-25 mg Oral Q4HRS PRN Austin Bass M.D.       • promethazine (PHENERGAN) suppository 12.5-25 mg  12.5-25 mg Rectal Q4HRS PRN Austin Bass M.D.       • prochlorperazine (COMPAZINE) injection 5-10 mg  5-10 mg Intravenous Q4HRS PRN Austin Bass M.D.       • guaiFENesin dextromethorphan (ROBITUSSIN DM) 100-10 MG/5ML syrup 10 mL  10 mL Oral Q6HRS PRN Austin Bass M.D.       • predniSONE (DELTASONE) tablet 60 mg  60  mg Oral DAILY Austin Bass M.D.   60 mg at 09/18/19 2106       Allergies  Allergies   Allergen Reactions   • Codeine    • Sulfa Drugs        Vital Signs last 24 hours  Temp:  [36.7 °C (98 °F)-36.8 °C (98.3 °F)] 36.7 °C (98 °F)  Pulse:  [] 92  Resp:  [18-20] 20  BP: (111-154)/(53-81) 111/53  SpO2:  [90 %-93 %] 92 %    Physical Exam  Physical Exam  Constitutional: She is oriented to person, place, and time. She appears well-developed and well-nourished.   HENT:   Head: Normocephalic and atraumatic.   Right Ear: External ear normal.   Left Ear: External ear normal.   Mouth/Throat: Oropharynx is clear and moist. No oropharyngeal exudate.   Eyes: Pupils are equal, round, and reactive to light. Conjunctivae and EOM are normal. No scleral icterus.   Neck: Normal range of motion. Neck supple. No JVD present. No tracheal deviation present.   Cardiovascular: Normal rate, regular rhythm and normal heart sounds. Exam reveals no gallop and no friction rub.   No murmur heard.  Pulmonary/Chest: Effort normal. No accessory muscle usage. No respiratory distress. She has no wheezes. She has rales.   Inspiratory rales b/l throughout with intermittent squeaks   Abdominal: Soft. She exhibits no distension. There is no tenderness.   Musculoskeletal: Normal range of motion. She exhibits no edema, tenderness or deformity.   Lymphadenopathy:     She has no cervical adenopathy.   Neurological: She is alert and oriented to person, place, and time. No cranial nerve deficit. Gait normal.   Skin: Skin is warm and dry. No rash noted. No cyanosis. Nails show no clubbing.   Psychiatric: She has a normal mood and affect.   Fluids    Intake/Output Summary (Last 24 hours) at 9/19/2019 1705  Last data filed at 9/19/2019 0400  Gross per 24 hour   Intake 480 ml   Output 0 ml   Net 480 ml       Laboratory  Recent Results (from the past 48 hour(s))   Procalcitonin    Collection Time: 09/18/19  7:18 PM   Result Value Ref Range    Procalcitonin  <0.05 <0.25 ng/mL   WESTERGREN SED RATE    Collection Time: 09/18/19  7:18 PM   Result Value Ref Range    Sed Rate Westergren 5 0 - 30 mm/hour   CRP QUANTITIVE (NON-CARDIAC)    Collection Time: 09/18/19  7:18 PM   Result Value Ref Range    Stat C-Reactive Protein 1.95 (H) 0.00 - 0.75 mg/dL   CBC with Differential    Collection Time: 09/19/19  2:04 AM   Result Value Ref Range    WBC 9.8 4.8 - 10.8 K/uL    RBC 5.67 (H) 4.20 - 5.40 M/uL    Hemoglobin 16.8 (H) 12.0 - 16.0 g/dL    Hematocrit 50.3 (H) 37.0 - 47.0 %    MCV 88.7 81.4 - 97.8 fL    MCH 29.6 27.0 - 33.0 pg    MCHC 33.4 (L) 33.6 - 35.0 g/dL    RDW 50.8 (H) 35.9 - 50.0 fL    Platelet Count 314 164 - 446 K/uL    MPV 9.1 9.0 - 12.9 fL    Neutrophils-Polys 92.70 (H) 44.00 - 72.00 %    Lymphocytes 4.70 (L) 22.00 - 41.00 %    Monocytes 1.20 0.00 - 13.40 %    Eosinophils 0.10 0.00 - 6.90 %    Basophils 1.00 0.00 - 1.80 %    Immature Granulocytes 0.30 0.00 - 0.90 %    Nucleated RBC 0.00 /100 WBC    Neutrophils (Absolute) 9.10 (H) 2.00 - 7.15 K/uL    Lymphs (Absolute) 0.46 (L) 1.00 - 4.80 K/uL    Monos (Absolute) 0.12 0.00 - 0.85 K/uL    Eos (Absolute) 0.01 0.00 - 0.51 K/uL    Baso (Absolute) 0.10 0.00 - 0.12 K/uL    Immature Granulocytes (abs) 0.03 0.00 - 0.11 K/uL    NRBC (Absolute) 0.00 K/uL   Comp Metabolic Panel (CMP)    Collection Time: 09/19/19  2:04 AM   Result Value Ref Range    Sodium 140 135 - 145 mmol/L    Potassium 4.1 3.6 - 5.5 mmol/L    Chloride 103 96 - 112 mmol/L    Co2 24 20 - 33 mmol/L    Anion Gap 13.0 (H) 0.0 - 11.9    Glucose 155 (H) 65 - 99 mg/dL    Bun 9 8 - 22 mg/dL    Creatinine 0.67 0.50 - 1.40 mg/dL    Calcium 9.4 8.4 - 10.2 mg/dL    AST(SGOT) 14 12 - 45 U/L    ALT(SGPT) 14 2 - 50 U/L    Alkaline Phosphatase 53 30 - 99 U/L    Total Bilirubin 0.4 0.1 - 1.5 mg/dL    Albumin 3.3 3.2 - 4.9 g/dL    Total Protein 8.2 6.0 - 8.2 g/dL    Globulin 4.9 (H) 1.9 - 3.5 g/dL    A-G Ratio 0.7 g/dL   ESTIMATED GFR    Collection Time: 09/19/19  2:04 AM    Result Value Ref Range    GFR If African American >60 >60 mL/min/1.73 m 2    GFR If Non African American >60 >60 mL/min/1.73 m 2       Imaging  DX-CHEST-2 VIEWS   Final Result      No radiographic evidence of acute cardiopulmonary process.      CT-CHEST, HIGH RESOLUTION LUNG    (Results Pending)   DX-ESOPHAGUS - BARIUM SWALLOW    (Results Pending)       Assessment/Plan  * Acute on chronic respiratory failure (HCC)- (present on admission)  Assessment & Plan  This is probably similar to the patient's previous problem with diffuse groundglass opacities on CT scan.  Even the patient's chest x-ray was unremarkable subtle changes in lung parenchyma may not be evident on chest x-ray.  The patient physical exam is consistent with inspiratory crackles especially in the middle and upper lung fields  I am going to check autoimmune serology including CHELLE, rheumatoid factor systemic sclerosis panel and Sjogren disease serology  Also I am going to repeat high-resolution  We will check BNP, procalcitonin was negative.  Continue bronchodilators.  No antibiotics and no systemic steroids at this point until we are able to narrow down the differential diagnosis  Continue oxygen supplementations as needed to keep O2 saturation above 90%    Moderate persistent asthma- (present on admission)  Assessment & Plan  The patient does not seem to be an asthma exacerbation by her exam, we will hold systemic steroids for now continue inhaled steroids and respiratory protocol    Interstitial lung disease (HCC)  Assessment & Plan  Diffuse groundglass opacities seen on CT scan on April 2019.  Initially they were thought to be secondary to pneumonia.  This groundglass opacity resolved and repeat CT scan few months later.  Patient current presentation and physical exam very suggestive of recurrence of the same problem.  The patient is afebrile, he she has normal procalcitonin.  Bacterial infection as the culprit of her problem is probably  unlikely.  The patient could have chronic aspiration without infection causing the symptoms especially given her history of gastric bypass surgery.  Agree with upper GI series  I am going to start the patient on PPI  Head of bed elevation   Follow-up on HRCT, hopefully this will be done tomorrow  Patient may need repeat bronchoscopy depending on the CT results.    Thank you very much for this consultation we will continue to follow patient is in the hospital    Discussed patient condition and risk of morbidity and/or mortality with Hospitalist, RT and Patient.

## 2019-09-19 NOTE — FLOWSHEET NOTE
09/18/19 2030   Type of Assessment   Assessment Yes   Patient History   Pulmonary Diagnosis asthma   Home O2 Use Prior To Admission? No   Home Treatments/Frequency Yes   MDI 1/Frequency flovent 110 BID   COPD Risk Screening   Do you have a history of COPD? No   COPD Population Screener   During the past 4 weeks, how much did you feel short of breath? 2   Do you ever cough up any mucus or phlegm? 1   In the past 12 months, you do less than you used to because of your breathing problems 2   Have you smoked at least 100 cigarettes in your entire life? 2   How old are you? 1   COPD Screening Score 8   COPD Coordinator Recommended Yes   Smoking History   Have you ever smoked Yes   Have you smoked in the last 12 months No   Confirm Quit Date 09/18/94   Level Of Consciousness   Level of Consciousness Alert   Respiratory WDL   Respiratory (WDL) X   Chest Exam   Chest Observation Barrel Chest   Work Of Breathing / Effort Mild   Respiration 18   Pulse 68   Heart Rate (Monitored) 68   Breath Sounds   Pre/Post Intervention Pre Intervention Assessment   RUL Breath Sounds Diminished   RML Breath Sounds Diminished;Fine Crackles   RLL Breath Sounds Diminished;Fine Crackles   BECCA Breath Sounds Diminished   LLL Breath Sounds Diminished;Fine Crackles   Secretions   Cough Productive   How Sputum Obtained Spontaneous   Sputum Amount Small   Sputum Color White   Sputum Consistency Thick   Protocol Pathways   Protocol Pathways Yes   Bronchodilator Protocol   Med Order With RCP Yes - Initial Order by MD for Therapy - Follow Order for 24 Hours, Reassess Patient   Is this an exacerbation of COPD/Asthma? Yes - Bronchodilators Q4 hours for 24 hours   Bronchodilator Indications History / Diagnosis   Breath Sounds Criteria 1    Benefit Criteria 1    Pulse Criteria 0    Respiratory Rate Criteria 1    S.O.B. Criteria 1    Criteria Total 4   Point Values 4-6 - QID and PRN   Bronchodilator Goals/Outcome Patient Able to Perform MDI/DPI Effectively  and Converted Accordingly;Patient at Stable Baseline   O2 Protocol   O2 (LPM) 3.5   Pulse Oximetry 92 %

## 2019-09-19 NOTE — FLOWSHEET NOTE
09/19/19 0700   Events/Summary/Plan   Non-Invasive Resp Device Site Inspection Completed Intact   Interdisciplinary Plan of Care-Goals (Indications)   Bronchodilator Indications History / Diagnosis   Interdisciplinary Plan of Care-Outcomes    Bronchodilator Outcome Diminished Wheezing and Volume of Air Movement Increased   Education   Education Yes - Pt. / Family has been Instructed in use of Respiratory Equipment;Yes - Pt. / Family has been Instructed in use of Respiratory Medications and Adverse Reactions   RT Assessment of Delivered Medications   Evaluation of Medication Delivery Daily Yes-- Pt /Family has been Instructed in use of Respiratory Medications and Adverse Reactions   SVN Group   #SVN Performed Yes   Given By: Mouthpiece   Date SVN Next Change Due (Q 7 Days) 09/25/19   Chest Exam   Chest Observation Barrel Chest   Work Of Breathing / Effort Mild   Respiration 20   Pulse 76   Breath Sounds   RUL Breath Sounds Clear;Diminished   RML Breath Sounds Fine Crackles;Diminished   RLL Breath Sounds Fine Crackles;Diminished   BECCA Breath Sounds Clear;Diminished   LLL Breath Sounds Fine Crackles;Diminished   Secretions   Cough Moist;Non Productive   Oximetry   #Pulse Oximetry (Single Determination) Yes   Oxygen   Home O2 Use Prior To Admission? No   Pulse Oximetry 93 %   O2 (LPM) 3.5   O2 Daily Delivery Respiratory  Silicone Nasal Cannula

## 2019-09-19 NOTE — FLOWSHEET NOTE
09/18/19 2123   Interdisciplinary Plan of Care-Goals (Indications)   Bronchodilator Indications History / Diagnosis   Interdisciplinary Plan of Care-Outcomes    Bronchodilator Outcome Improved Vital Signs and Measures of Gas Exchange   Education   Education Yes - Pt. / Family has been Instructed in use of Respiratory Equipment;Yes - Pt. / Family has been Instructed in use of Respiratory Medications and Adverse Reactions   RT Assessment of Delivered Medications   Evaluation of Medication Delivery Daily Yes-- Pt /Family has been Instructed in use of Respiratory Medications and Adverse Reactions   SVN Group   #SVN Performed Yes   Given By: Mouthpiece   Date SVN Last Changed 09/18/19   Date SVN Next Change Due (Q 7 Days) 09/25/19   Respiratory WDL   Respiratory (WDL) X   Chest Exam   Chest Observation Barrel Chest   Work Of Breathing / Effort Mild   Respiration 18   Pulse 85   Heart Rate (Monitored) 85   Breath Sounds   Pre/Post Intervention Pre Intervention Assessment   RUL Breath Sounds Diminished   RML Breath Sounds Diminished;Fine Crackles   RLL Breath Sounds Fine Crackles;Diminished   BECCA Breath Sounds Diminished   LLL Breath Sounds Diminished;Fine Crackles   Secretions   Cough Dry   Oximetry   #Pulse Oximetry (Single Determination) Yes   Oxygen   Home O2 Use Prior To Admission? No   Pulse Oximetry 92 %   O2 (LPM) 3.5

## 2019-09-19 NOTE — RESPIRATORY CARE
We changed patients respiratory therapy Rx from duoneb to 0.63 mg levalbuterol due to HR side effects. With the levalbuterol dose her HR still increased from 80 to 104. The patient also claims that there was less benefit from the levalbuterol and that she would prefer to return to the duoneb or albuterol therapy.

## 2019-09-19 NOTE — PROGRESS NOTES
"Direct admit via pulmonologist 2/2 low oxygen saturation at office. Arrived to floor, vitals taken, height and weight recorded. Placed on 2.5lpm via nc. Patient states +navarro, dry non-productive cough, feeling \"tired\".   A+O x 4, denies sob at rest/lightheadedness/numbness/tingling/dizziness/chest pain/n/v/d. Fall precautions/hourly rounding maintained, call light within reach and functioning, all items within reach.  Patient encouraged to call for assistance, poc reviewed with patient, ?'s/concerns answered.     Awaiting orders from hospitalist.   "

## 2019-09-19 NOTE — PROGRESS NOTES
Pt A&Ox4. Vss. No pain or n/v at this time. Pt is on 3.5L of oxygen to maintain O2 sat. Lung sounds are diminished throughout. Pt denies feeling SOB. Fall precautions in place.

## 2019-09-19 NOTE — H&P
Hospital Medicine History & Physical Note    Date of Service  9/18/2019    Primary Care Physician  JENNIFER Orr.    Consultants  none- may need a pulmonary consult    Code Status  full    Chief Complaint  Short of breath    History of Presenting Illness  59 y.o. female who presented 9/18/2019 with hypoxia. She was seen today by Dr Pitt at the pulmonary clinic for a routine follow up. She says she has been checking her pulse ox for the last week and it has been in the 80s. She has a significant recent history of a severe community acquired pneumonia in April that required a bronc showing blood fluid with many polys present. Cultures however remained negative. A CT at that time showed bilateral groundglass opacities. She improved with antibiotics and steroids and was not hypoxic on discharge. A follow up CT in June showed a significant improvement in her CT findings. She was having persistent symptoms and had a bronchdilator response to PFTs 1/19. She was placed onto inhalers for suspected RAD and did improve initially. Since then she worsened with worsening hypoxia and dyspnea and a cough productive of shite sputum. She denies fevers or chills.     Review of Systems  ROS    Past Medical History   has a past medical history of Asthma, Diabetes (HCC), Hyperlipidemia, Hypertension, and Thyroid disease.    Surgical History   has a past surgical history that includes pr c-sec only,prev c-sec; bronchoscopy-endo (4/23/2019); bronchoscopy/ndl bx (4/23/2019); primary c section; and gastrectomy (2006).     Family History  family history includes Cancer in her father, maternal grandmother, and sister; Cancer (age of onset: 55) in her mother; Diabetes in her father; Heart Disease in her mother.     Social History   reports that she quit smoking about 25 years ago. Her smoking use included cigarettes. She has a 20.00 pack-year smoking history. She has never used smokeless tobacco. She reports that she drinks about  0.6 - 1.2 oz of alcohol per week. She reports that she has current or past drug history. Drug: Marijuana.    Allergies  Allergies   Allergen Reactions   • Codeine    • Sulfa Drugs        Medications  Prior to Admission Medications   Prescriptions Last Dose Informant Patient Reported? Taking?   Omega-3 Fatty Acids (FISH OIL) 1000 MG Cap capsule   Yes No   Sig: Take 1,000 mg by mouth every day.   albuterol 108 (90 Base) MCG/ACT Aero Soln inhalation aerosol   Yes No   Sig: Inhale 2 Puffs by mouth every 6 hours as needed for Shortness of Breath.   cyanocobalamin (VITAMIN B-12) 500 MCG Tab   Yes No   Sig: Take 500 mcg by mouth every day.   fluticasone (FLOVENT HFA) 110 MCG/ACT Aerosol   No No   Sig: Inhale 2 Puffs by mouth every 12 hours.   levothyroxine (SYNTHROID) 88 MCG Tab   Yes No   Sig: TAKE ONE TABLET BY MOUTH ONCE DAILY IN THE MORNING FOR THYROID. TAKE ON EMPTY STOMACH. DO NOT EAT FOR 30 MINUTES   lisinopril (PRINIVIL) 20 MG TABS   Yes No   Sig: Take 20 mg by mouth every day.   magnesium gluconate (MAG-G) 500 MG tablet   Yes No   Sig: Take 500 mg by mouth every day.   omeprazole (PRILOSEC) 20 MG delayed-release capsule   Yes No   Sig: Take 20 mg by mouth every day.   therapeutic multivitamin-minerals (THERAGRAN-M) Tab   Yes No   Sig: Take 1 Tab by mouth every day.   vitamin D (CHOLECALCIFEROL) 1000 UNIT Tab   Yes No   Sig: Take 500 Units by mouth every day.      Facility-Administered Medications: None       Physical Exam  Pulse:  [91] 91  Resp:  [18] 18  BP: (147)/(80) 147/80  SpO2:  [92 %] 92 %    Physical Exam    Laboratory:          No results for input(s): ALTSGPT, ASTSGOT, ALKPHOSPHAT, TBILIRUBIN, DBILIRUBIN, GAMMAGT, AMYLASE, LIPASE, ALB, PREALBUMIN, GLUCOSE in the last 72 hours.      No results for input(s): NTPROBNP in the last 72 hours.      No results for input(s): TROPONINT in the last 72 hours.    Urinalysis:    No results found     Imaging:  CT-CHEST, HIGH RESOLUTION LUNG    (Results Pending)          Assessment/Plan:  I anticipate this patient is appropriate for observation status at this time.    * Acute on chronic respiratory failure (HCC)- (present on admission)  Assessment & Plan  Worsening hypoxia and coughing in the setting of possible RAD. Will get a sputum culture and repeat a high res ct of the chest given her history earlier this year. Pulmonary has requested the CT. Will also check a procalcitonin, sed rate and crp. Consult speech for possible recurrent aspiration as well given her history of a gastric bypass.     Moderate persistent asthma- (present on admission)  Assessment & Plan  Treat for acute flare as above.     Type 2 diabetes mellitus without complication, without long-term current use of insulin (HCC)- (present on admission)  Assessment & Plan  lasi a1c was 6.2 and not on meds. Will monitor daily glucose for now.     Postoperative hypothyroidism- (present on admission)  Assessment & Plan  Continue meds    Essential hypertension- (present on admission)  Assessment & Plan  Continue meds      VTE prophylaxis: scd

## 2019-09-19 NOTE — CARE PLAN
Problem: Safety  Goal: Will remain free from injury  Outcome: PROGRESSING AS EXPECTED     Problem: Venous Thromboembolism (VTW)/Deep Vein Thrombosis (DVT) Prevention:  Goal: Patient will participate in Venous Thrombosis (VTE)/Deep Vein Thrombosis (DVT)Prevention Measures  Outcome: PROGRESSING AS EXPECTED     Problem: Knowledge Deficit  Goal: Knowledge of disease process/condition, treatment plan, diagnostic tests, and medications will improve  Outcome: PROGRESSING AS EXPECTED

## 2019-09-19 NOTE — FLOWSHEET NOTE
09/18/19 2030   Type of Assessment   Assessment Yes   Patient History   Pulmonary Diagnosis asthma   Home O2 Use Prior To Admission? No   Home Treatments/Frequency Yes   MDI 1/Frequency flovent 110 NID   COPD Risk Screening   Do you have a history of COPD? No   COPD Population Screener   During the past 4 weeks, how much did you feel short of breath? 2   Do you ever cough up any mucus or phlegm? 1   In the past 12 months, you do less than you used to because of your breathing problems 2   Have you smoked at least 100 cigarettes in your entire life? 2   How old are you? 1   COPD Screening Score 8   COPD Coordinator Recommended Yes   Smoking History   Have you ever smoked Yes   Have you smoked in the last 12 months No   Confirm Quit Date 09/18/94   Level Of Consciousness   Level of Consciousness Alert   Respiratory WDL   Respiratory (WDL) X   Chest Exam   Chest Observation Barrel Chest   Work Of Breathing / Effort Mild   Respiration 18   Pulse 68   Heart Rate (Monitored) 68   Breath Sounds   Pre/Post Intervention Pre Intervention Assessment   RUL Breath Sounds Diminished   RML Breath Sounds Diminished;Fine Crackles   RLL Breath Sounds Diminished;Fine Crackles   BECCA Breath Sounds Diminished   LLL Breath Sounds Diminished;Fine Crackles   Secretions   Cough Productive   How Sputum Obtained Spontaneous   Sputum Amount Small   Sputum Color White   Sputum Consistency Thick   Protocol Pathways   Protocol Pathways Yes   Bronchodilator Protocol   Med Order With RCP Yes - Initial Order by MD for Therapy - Follow Order for 24 Hours, Reassess Patient   Is this an exacerbation of COPD/Asthma? Yes - Bronchodilators Q4 hours for 24 hours   Bronchodilator Indications History / Diagnosis   Breath Sounds Criteria 1    Benefit Criteria 1    Pulse Criteria 0    Respiratory Rate Criteria 1    S.O.B. Criteria 1    Criteria Total 4   Point Values 4-6 - QID and PRN   Bronchodilator Goals/Outcome Patient Able to Perform MDI/DPI Effectively  and Converted Accordingly;Patient at Stable Baseline   O2 Protocol   O2 (LPM) 3.5   Pulse Oximetry 92 %

## 2019-09-19 NOTE — FLOWSHEET NOTE
09/19/19 0222   Interdisciplinary Plan of Care-Goals (Indications)   Bronchodilator Indications History / Diagnosis   Interdisciplinary Plan of Care-Outcomes    Bronchodilator Outcome Diminished Wheezing and Volume of Air Movement Increased   Education   Education Yes - Pt. / Family has been Instructed in use of Respiratory Equipment;Yes - Pt. / Family has been Instructed in use of Respiratory Medications and Adverse Reactions   RT Assessment of Delivered Medications   Evaluation of Medication Delivery Daily Yes-- Pt /Family has been Instructed in use of Respiratory Medications and Adverse Reactions   SVN Group   #SVN Performed Yes   Given By: Mouthpiece   Respiratory WDL   Respiratory (WDL) X   Chest Exam   Chest Observation Barrel Chest   Work Of Breathing / Effort Mild   Respiration 19   Pulse 80   Heart Rate (Monitored) 80   Breath Sounds   Pre/Post Intervention Pre Intervention Assessment   RUL Breath Sounds Diminished   RML Breath Sounds Diminished   RLL Breath Sounds Diminished;Fine Crackles   BECCA Breath Sounds Diminished   LLL Breath Sounds Diminished;Fine Crackles   Secretions   Cough Dry   Oximetry   #Pulse Oximetry (Single Determination) Yes   Oxygen   Home O2 Use Prior To Admission? No   Pulse Oximetry 90 %   O2 (LPM) 3.5

## 2019-09-19 NOTE — FLOWSHEET NOTE
09/19/19 1100   Events/Summary/Plan   Non-Invasive Resp Device Site Inspection Completed Intact   Interdisciplinary Plan of Care-Goals (Indications)   Bronchodilator Indications History / Diagnosis   Interdisciplinary Plan of Care-Outcomes    Bronchodilator Outcome Diminished Wheezing and Volume of Air Movement Increased   Education   Education Yes - Pt. / Family has been Instructed in use of Respiratory Equipment;Yes - Pt. / Family has been Instructed in use of Respiratory Medications and Adverse Reactions   RT Assessment of Delivered Medications   Evaluation of Medication Delivery Daily Yes-- Pt /Family has been Instructed in use of Respiratory Medications and Adverse Reactions   SVN Group   #SVN Performed Yes   Given By: Mouthpiece   Date SVN Next Change Due (Q 7 Days) 09/25/19   Chest Exam   Chest Observation Barrel Chest   Work Of Breathing / Effort Mild   Respiration 20   Pulse 88  (100 after Rx)   Breath Sounds   RUL Breath Sounds Clear;Diminished   RML Breath Sounds Diminished;Fine Crackles   RLL Breath Sounds Diminished;Fine Crackles   BECCA Breath Sounds Clear;Diminished   LLL Breath Sounds Diminished;Fine Crackles   Secretions   Cough Moist;Non Productive   Oxygen   O2 (LPM) 3.5

## 2019-09-19 NOTE — FLOWSHEET NOTE
09/19/19 1511   Events/Summary/Plan   Non-Invasive Resp Device Site Inspection Completed Intact   Interdisciplinary Plan of Care-Goals (Indications)   Bronchodilator Indications History / Diagnosis   Interdisciplinary Plan of Care-Outcomes    Bronchodilator Outcome Improved Patient Appearance with Decreased use of Accessory Muscles   Education   Education Yes - Pt. / Family has been Instructed in use of Respiratory Medications and Adverse Reactions   RT Assessment of Delivered Medications   Evaluation of Medication Delivery Daily Yes-- Pt /Family has been Instructed in use of Respiratory Medications and Adverse Reactions   SVN Group   #SVN Performed Yes   Given By: Mouthpiece   Date SVN Next Change Due (Q 7 Days) 09/25/19   Chest Exam   Work Of Breathing / Effort Mild   Respiration 20   Pulse 92   Breath Sounds   RUL Breath Sounds Clear;Diminished   RML Breath Sounds Diminished;Fine Crackles   RLL Breath Sounds Diminished;Fine Crackles   BECCA Breath Sounds Clear;Diminished   LLL Breath Sounds Diminished;Fine Crackles   Oxygen   Pulse Oximetry 92 %   O2 (LPM) 3   O2 Daily Delivery Respiratory  Silicone Nasal Cannula

## 2019-09-20 ENCOUNTER — APPOINTMENT (OUTPATIENT)
Dept: RADIOLOGY | Facility: MEDICAL CENTER | Age: 59
End: 2019-09-20
Attending: HOSPITALIST
Payer: COMMERCIAL

## 2019-09-20 VITALS
BODY MASS INDEX: 24.62 KG/M2 | TEMPERATURE: 98 F | OXYGEN SATURATION: 91 % | HEIGHT: 70 IN | WEIGHT: 171.96 LBS | HEART RATE: 76 BPM | RESPIRATION RATE: 20 BRPM | SYSTOLIC BLOOD PRESSURE: 121 MMHG | DIASTOLIC BLOOD PRESSURE: 64 MMHG

## 2019-09-20 PROCEDURE — A9270 NON-COVERED ITEM OR SERVICE: HCPCS | Performed by: HOSPITALIST

## 2019-09-20 PROCEDURE — 700111 HCHG RX REV CODE 636 W/ 250 OVERRIDE (IP): Performed by: HOSPITALIST

## 2019-09-20 PROCEDURE — 94640 AIRWAY INHALATION TREATMENT: CPT

## 2019-09-20 PROCEDURE — 74220 X-RAY XM ESOPHAGUS 1CNTRST: CPT

## 2019-09-20 PROCEDURE — 700101 HCHG RX REV CODE 250: Performed by: HOSPITALIST

## 2019-09-20 PROCEDURE — 99217 PR OBSERVATION CARE DISCHARGE: CPT | Performed by: HOSPITALIST

## 2019-09-20 PROCEDURE — 99214 OFFICE O/P EST MOD 30 MIN: CPT | Performed by: INTERNAL MEDICINE

## 2019-09-20 PROCEDURE — G0378 HOSPITAL OBSERVATION PER HR: HCPCS

## 2019-09-20 PROCEDURE — 94760 N-INVAS EAR/PLS OXIMETRY 1: CPT

## 2019-09-20 PROCEDURE — 700102 HCHG RX REV CODE 250 W/ 637 OVERRIDE(OP): Performed by: HOSPITALIST

## 2019-09-20 RX ORDER — AMOXICILLIN AND CLAVULANATE POTASSIUM 875; 125 MG/1; MG/1
1 TABLET, FILM COATED ORAL 2 TIMES DAILY
Qty: 14 TAB | Refills: 0 | Status: SHIPPED | OUTPATIENT
Start: 2019-09-20 | End: 2019-09-27

## 2019-09-20 RX ORDER — OMEPRAZOLE 40 MG/1
40 CAPSULE, DELAYED RELEASE ORAL DAILY
Qty: 30 CAP | Refills: 1 | Status: SHIPPED | OUTPATIENT
Start: 2019-09-20 | End: 2019-10-13

## 2019-09-20 RX ADMIN — IPRATROPIUM BROMIDE AND ALBUTEROL SULFATE 3 ML: .5; 3 SOLUTION RESPIRATORY (INHALATION) at 15:06

## 2019-09-20 RX ADMIN — LISINOPRIL 20 MG: 20 TABLET ORAL at 05:41

## 2019-09-20 RX ADMIN — PREDNISONE 60 MG: 10 TABLET ORAL at 18:26

## 2019-09-20 RX ADMIN — BARIUM SULFATE 700 MG: 700 TABLET ORAL at 08:15

## 2019-09-20 RX ADMIN — IPRATROPIUM BROMIDE AND ALBUTEROL SULFATE 3 ML: .5; 3 SOLUTION RESPIRATORY (INHALATION) at 11:08

## 2019-09-20 RX ADMIN — OMEPRAZOLE 20 MG: 20 CAPSULE, DELAYED RELEASE ORAL at 05:40

## 2019-09-20 RX ADMIN — LEVOTHYROXINE SODIUM 88 MCG: 88 TABLET ORAL at 05:41

## 2019-09-20 RX ADMIN — FLUTICASONE PROPIONATE 220 MCG: 110 AEROSOL, METERED RESPIRATORY (INHALATION) at 05:41

## 2019-09-20 RX ADMIN — IPRATROPIUM BROMIDE AND ALBUTEROL SULFATE 3 ML: .5; 3 SOLUTION RESPIRATORY (INHALATION) at 07:42

## 2019-09-20 NOTE — FLOWSHEET NOTE
09/20/19 1507   Events/Summary/Plan   Non-Invasive Resp Device Site Inspection Completed Intact   Interdisciplinary Plan of Care-Goals (Indications)   Bronchodilator Indications History / Diagnosis   Interdisciplinary Plan of Care-Outcomes    Bronchodilator Outcome Improved Patient Appearance with Decreased use of Accessory Muscles   Education   Education Yes - Pt. / Family has been Instructed in use of Respiratory Medications and Adverse Reactions   RT Assessment of Delivered Medications   Evaluation of Medication Delivery Daily Yes-- Pt /Family has been Instructed in use of Respiratory Medications and Adverse Reactions   SVN Group   #SVN Performed Yes   Given By: Mouthpiece   Date SVN Next Change Due (Q 7 Days) 09/25/19   Chest Exam   Respiration 20   Pulse 76   Breath Sounds   RUL Breath Sounds Clear   RML Breath Sounds Clear   RLL Breath Sounds Fine Crackles   BECCA Breath Sounds Clear   LLL Breath Sounds Fine Crackles   Secretions   Cough Non Productive   Oxygen   O2 (LPM) 3   O2 Daily Delivery Respiratory  Silicone Nasal Cannula

## 2019-09-20 NOTE — DISCHARGE PLANNING
Received Choice form at [phone]  Agency/Facility Name: [facility]  Referral sent per Choice form @ [id number]

## 2019-09-20 NOTE — ASSESSMENT & PLAN NOTE
Diffuse groundglass opacities seen on CT scan on April 2019.  Initially they were thought to be secondary to pneumonia.  This groundglass opacity resolved and repeat CT scan few months later.  Patient current presentation and physical exam very suggestive of recurrence of the same problem.  The patient is afebrile, he she has normal procalcitonin.  Bacterial infection as the culprit of her problem is probably unlikely.  The patient could have chronic aspiration without infection causing the symptoms especially given her history of gastric bypass surgery.  Agree with upper GI series  I am going to start the patient on PPI  Head of bed elevation   Follow-up on HRCT, hopefully this will be done tomorrow  Patient may need repeat bronchoscopy depending on the CT results.

## 2019-09-20 NOTE — ASSESSMENT & PLAN NOTE
The patient does not seem to be an asthma exacerbation by her exam, we will hold systemic steroids for now continue inhaled steroids and respiratory protocol

## 2019-09-20 NOTE — DISCHARGE PLANNING
Agency/Facility Name: [facility]  Spoke To: Yarelis  Outcome: They have the referral and are currently working on it, as soon as they are done they will deliver o2 to bedside.

## 2019-09-20 NOTE — FLOWSHEET NOTE
09/20/19 1100   Events/Summary/Plan   Non-Invasive Resp Device Site Inspection Completed Intact   Interdisciplinary Plan of Care-Goals (Indications)   Bronchodilator Indications History / Diagnosis   Interdisciplinary Plan of Care-Outcomes    Bronchodilator Outcome Improved Patient Appearance with Decreased use of Accessory Muscles   Education   Education Yes - Pt. / Family has been Instructed in use of Respiratory Medications and Adverse Reactions   RT Assessment of Delivered Medications   Evaluation of Medication Delivery Daily Yes-- Pt /Family has been Instructed in use of Respiratory Medications and Adverse Reactions   SVN Group   #SVN Performed Yes   Given By: Mouthpiece   Date SVN Next Change Due (Q 7 Days) 09/25/19   Chest Exam   Work Of Breathing / Effort Mild   Respiration 20   Pulse 76   Breath Sounds   RUL Breath Sounds Clear   RML Breath Sounds Clear   RLL Breath Sounds Fine Crackles   BECCA Breath Sounds Clear   LLL Breath Sounds Fine Crackles   Secretions   Cough Non Productive   Oximetry   #Pulse Oximetry (Single Determination) Yes   Oxygen   Pulse Oximetry 94 %   O2 (LPM) 3   O2 Daily Delivery Respiratory  Silicone Nasal Cannula

## 2019-09-20 NOTE — DISCHARGE PLANNING
Anticipated Discharge Disposition: Home    Action: Met with pt at bedside to obtain choice for DME. Choice form completed for [facility] and faxed to [facility]    Barriers to Discharge: DME acceptance    Plan: F/U with [facility]

## 2019-09-20 NOTE — FLOWSHEET NOTE
09/19/19 1930   Events/Summary/Plan   Events/Summary/Plan SVN with MP upright in bed   Non-Invasive Resp Device Site Inspection Completed Intact   Interdisciplinary Plan of Care-Goals (Indications)   Bronchodilator Indications History / Diagnosis   Interdisciplinary Plan of Care-Outcomes    Bronchodilator Outcome Improved Patient Appearance with Decreased use of Accessory Muscles   Education   Education Yes - Pt. / Family has been Instructed in use of Respiratory Medications and Adverse Reactions   RT Assessment of Delivered Medications   Evaluation of Medication Delivery Daily Yes-- Pt /Family has been Instructed in use of Respiratory Medications and Adverse Reactions   SVN Group   #SVN Performed Yes   Given By: Mouthpiece   Date SVN Last Changed 09/18/19   Date SVN Next Change Due (Q 7 Days) 09/25/19   Chest Exam   Work Of Breathing / Effort Mild   Respiration 18   Pulse 70   Breath Sounds   RUL Breath Sounds Clear;Diminished   RML Breath Sounds Diminished   RLL Breath Sounds Diminished;Fine Crackles   BECCA Breath Sounds Clear;Diminished   LLL Breath Sounds Diminished;Fine Crackles   Secretions   Cough Non Productive;Strong   Oximetry   #Pulse Oximetry (Single Determination) Yes   Oxygen   Home O2 Use Prior To Admission? No   Pulse Oximetry 92 %   O2 (LPM) 3   O2 Daily Delivery Respiratory  Silicone Nasal Cannula

## 2019-09-20 NOTE — ASSESSMENT & PLAN NOTE
Suspected with recurrent hypoxia and interstitial pneumonia  Workup for immunological causes ordered per pulmonary discussed with DR. Richard.  Supplemental oxygen, home oxygen orders placed

## 2019-09-20 NOTE — FLOWSHEET NOTE
09/20/19 0700   Events/Summary/Plan   Non-Invasive Resp Device Site Inspection Completed Intact   Interdisciplinary Plan of Care-Goals (Indications)   Bronchodilator Indications History / Diagnosis   Interdisciplinary Plan of Care-Outcomes    Bronchodilator Outcome Improved Patient Appearance with Decreased use of Accessory Muscles   Education   Education Yes - Pt. / Family has been Instructed in use of Respiratory Medications and Adverse Reactions   RT Assessment of Delivered Medications   Evaluation of Medication Delivery Daily Yes-- Pt /Family has been Instructed in use of Respiratory Medications and Adverse Reactions   SVN Group   #SVN Performed Yes   Given By: Mouthpiece   Date SVN Next Change Due (Q 7 Days) 09/25/19   Chest Exam   Work Of Breathing / Effort Mild   Respiration 20   Pulse 66   Breath Sounds   RUL Breath Sounds Clear   RML Breath Sounds Fine Crackles   RLL Breath Sounds Fine Crackles   BECCA Breath Sounds Clear   LLL Breath Sounds Fine Crackles   Secretions   Cough Non Productive;Productive   Oximetry   #Pulse Oximetry (Single Determination) Yes   Oxygen   Pulse Oximetry 95 %   O2 (LPM) 3.5   O2 Daily Delivery Respiratory  Silicone Nasal Cannula

## 2019-09-20 NOTE — ASSESSMENT & PLAN NOTE
This is probably similar to the patient's previous problem with diffuse groundglass opacities on CT scan.  Even the patient's chest x-ray was unremarkable subtle changes in lung parenchyma may not be evident on chest x-ray.  The patient physical exam is consistent with inspiratory crackles especially in the middle and upper lung fields  I am going to check autoimmune serology including CHELLE, rheumatoid factor systemic sclerosis panel and Sjogren disease serology  Also I am going to repeat high-resolution  We will check BNP, procalcitonin was negative.  Continue bronchodilators.  No antibiotics and no systemic steroids at this point until we are able to narrow down the differential diagnosis  Continue oxygen supplementations as needed to keep O2 saturation above 90%

## 2019-09-20 NOTE — DISCHARGE SUMMARY
Discharge Summary    CHIEF COMPLAINT ON ADMISSION  No chief complaint on file.      Reason for Admission  hypoxia     Admission Date  9/18/2019    CODE STATUS  Full Code    HPI & HOSPITAL COURSE    History of Presenting Illness per Dr. Austin Bass's H&P:  59 y.o. female who presented 9/18/2019 with hypoxia. She was seen today by Dr Pitt at the pulmonary clinic for a routine follow up. She says she has been checking her pulse ox for the last week and it has been in the 80s. She has a significant recent history of a severe community acquired pneumonia in April that required a bronc showing blood fluid with many polys present. Cultures however remained negative. A CT at that time showed bilateral groundglass opacities. She improved with antibiotics and steroids and was not hypoxic on discharge. A follow up CT in June showed a significant improvement in her CT findings. She was having persistent symptoms and had a bronchdilator response to PFTs 1/19. She was placed onto inhalers for suspected RAD and did improve initially. Since then she worsened with worsening hypoxia and dyspnea and a cough productive of shite sputum. She denies fevers or chills.       HOSPITAL COURSE: Patient definitely had return of crackles and hypoxia. She has a history of a Kennedy-en-Y  Procedure done in 2006. Upper GI series showed definite reflux of stomach contents. I am concerned that she is having chronic silent aspiration due to reflux. Patient advised to stop eating and drinking 3 hours prior to bed and sleep at a 30 degree angle. She needs to be seen by Dr. Ganser to evaluate for possible revision of her prior surgery or other procedure to decrease the reflux. Pulmonary consult done as well, Dr. Richard agrees with above and will also arrange for a high resolution CT scan of the chest as an outpatient. Patient will go home on a course of oral augmentin as well as supplemental oxygen.    Therefore, she is discharged in good and stable  condition to home with close outpatient follow-up.        Discharge Date  9/20/19    FOLLOW UP ITEMS POST DISCHARGE  High resolution CT scan.  Follow up with Dr. John Ganser  Follow up with Dr. Pitt in pulmonary clinic    DISCHARGE DIAGNOSES  Principal Problem:    Acute on chronic respiratory failure (HCC) POA: Yes  Active Problems:    Moderate persistent asthma POA: Yes    Essential hypertension POA: Yes    Postoperative hypothyroidism POA: Yes    Type 2 diabetes mellitus without complication, without long-term current use of insulin (HCC) POA: Yes    Interstitial lung disease (HCC) POA: Unknown  Resolved Problems:    * No resolved hospital problems. *      FOLLOW UP  Future Appointments   Date Time Provider Department Center   12/16/2019  4:15 PM Irais Pitt M.D. PULM None     John H Ganser, M.D.  [address] [address]  [phone]    Schedule an appointment as soon as possible for a visit        MEDICATIONS ON DISCHARGE     Medication List      START taking these medications      Instructions   amoxicillin-clavulanate 875-125 MG Tabs  Commonly known as:  AUGMENTIN   Take 1 Tab by mouth 2 times a day for 7 days.  Dose:  1 Tab        CHANGE how you take these medications      Instructions   omeprazole 40 MG delayed-release capsule  What changed:    · medication strength  · how much to take  Commonly known as:  PRILOSEC   Take 1 Cap by mouth every day.  Dose:  40 mg        CONTINUE taking these medications      Instructions   albuterol 108 (90 Base) MCG/ACT Aers inhalation aerosol   Inhale 2 Puffs by mouth every 6 hours as needed for Shortness of Breath.  Dose:  2 Puff     cyanocobalamin 500 MCG Tabs  Commonly known as:  VITAMIN B-12   Take 500 mcg by mouth every day.  Dose:  500 mcg     fish oil 1000 MG Caps capsule   Take 1,000 mg by mouth every day.  Dose:  1,000 mg     fluticasone 110 MCG/ACT Aero  Commonly known as:  FLOVENT HFA   Inhale 2 Puffs by mouth every 12 hours.  Dose:  2  Puff     levothyroxine 88 MCG Tabs  Commonly known as:  SYNTHROID   TAKE ONE TABLET BY MOUTH ONCE DAILY IN THE MORNING FOR THYROID. TAKE ON EMPTY STOMACH. DO NOT EAT FOR 30 MINUTES     lisinopril 20 MG Tabs  Commonly known as:  PRINIVIL   Take 20 mg by mouth every day.  Dose:  20 mg     magnesium gluconate 500 MG tablet  Commonly known as:  MAG-G   Take 500 mg by mouth every day.  Dose:  500 mg     therapeutic multivitamin-minerals Tabs   Take 1 Tab by mouth every day.  Dose:  1 Tab     vitamin D 1000 UNIT Tabs  Commonly known as:  cholecalciferol   Take 500 Units by mouth every day.  Dose:  500 Units            Allergies  Allergies   Allergen Reactions   • Codeine    • Sulfa Drugs        DIET  Orders Placed This Encounter   Procedures   • Diet Order Regular     Standing Status:   Standing     Number of Occurrences:   1     Order Specific Question:   Diet:     Answer:   Regular [1]       ACTIVITY  As tolerated.  Weight bearing as tolerated    CONSULTATIONS  Pulmonology, Dr. Richard    PROCEDURES  None.    LABORATORY  Lab Results   Component Value Date    SODIUM 140 09/19/2019    POTASSIUM 4.1 09/19/2019    CHLORIDE 103 09/19/2019    CO2 24 09/19/2019    GLUCOSE 155 (H) 09/19/2019    BUN 9 09/19/2019    CREATININE 0.67 09/19/2019        Lab Results   Component Value Date    WBC 9.8 09/19/2019    HEMOGLOBIN 16.8 (H) 09/19/2019    HEMATOCRIT 50.3 (H) 09/19/2019    PLATELETCT 314 09/19/2019        Total time of the discharge process exceeds 35 minutes.

## 2019-09-20 NOTE — PROGRESS NOTES
Bedside report received from night RN. Assumed care of patient. Daily plan of care discussed. Pt awake and alert, denies needs or concerns at this time. Pt denies pain or nausea. Hourly rounding in place.

## 2019-09-20 NOTE — DISCHARGE INSTRUCTIONS
Discharge Instructions    Discharged to home by car with relative. Discharged via walking, hospital escort: Yes.  Special equipment needed: Not Applicable    Be sure to schedule a follow-up appointment with your primary care doctor or any specialists as instructed.     Discharge Plan:   Diet Plan: Discussed  Activity Level: Discussed  Confirmed Follow up Appointment: Patient to Call and Schedule Appointment  Confirmed Symptoms Management: Discussed  Medication Reconciliation Updated: Yes    I understand that a diet low in cholesterol, fat, and sodium is recommended for good health. Unless I have been given specific instructions below for another diet, I accept this instruction as my diet prescription.   Other diet: n/a    Special Instructions:  Discharge Instructions per Doreen Bass M.D.    Follow up with Dr. John Ganser for surgical consultation, pulmonary clinic, outpatient CT scan of chest.    DIET: regular. Stop eating and drinking 3 hours prior to going to bed.    ACTIVITY: as tolerated.    DIAGNOSIS: respiratory failure, pneumonia, acid reflux.    Return to ER if you have fever, chills, shortness of breath, low oxygen.    · Is patient discharged on Warfarin / Coumadin?   No     Depression / Suicide Risk    As you are discharged from this [facility] facility, it is important to learn how to keep safe from harming yourself.    Recognize the warning signs:  · Abrupt changes in personality, positive or negative- including increase in energy   · Giving away possessions  · Change in eating patterns- significant weight changes-  positive or negative  · Change in sleeping patterns- unable to sleep or sleeping all the time   · Unwillingness or inability to communicate  · Depression  · Unusual sadness, discouragement and loneliness  · Talk of wanting to die  · Neglect of personal appearance   · Rebelliousness- reckless behavior  · Withdrawal from people/activities they love  · Confusion- inability to  concentrate     If you or a loved one observes any of these behaviors or has concerns about self-harm, here's what you can do:  · Talk about it- your feelings and reasons for harming yourself  · Remove any means that you might use to hurt yourself (examples: pills, rope, extension cords, firearm)  · Get professional help from the community (Mental Health, Substance Abuse, psychological counseling)  · Do not be alone:Call your Safe Contact- someone whom you trust who will be there for you.  · Call your local CRISIS HOTLINE 810-6773 or 388-303-5128  · Call your local Children's Mobile Crisis Response Team [place] (848) 377-5576 or www.[facility].com  · Call the toll free National Suicide Prevention Hotlines   · National Suicide Prevention Lifeline 291-816-TITP (0280)  · National Hope Line Network 800-SUICIDE (933-6517)

## 2019-09-20 NOTE — PROGRESS NOTES
Pt AAOx4. Denies any pain, SOB, numbness or tingling, nausea. Pt up in the bed watching TV at the moment. Reports O2 was titrated by RT from 3.5 to 3L NC pt O2 sat was 88% upon assessment; O2 titrated back to 3.5L and pt taught with breathing exercises. O2 sat @ 94% now; pt is more comfortable; also reports to have BM today. Safety and comfort measures in place. No additional needs at this time.

## 2019-09-20 NOTE — CARE PLAN
Problem: Communication  Goal: The ability to communicate needs accurately and effectively will improve  Outcome: PROGRESSING AS EXPECTED   Pt communicates needs to staff appropriately. Whiteboard updated. Questions and concerns regarding POC and tests answered. Pt verbalized understanding.    Problem: Respiratory:  Goal: Respiratory status will improve  Outcome: PROGRESSING AS EXPECTED   Pt  on 3.5L o2 NC to maintain sat >90%. Denies SOB; RT following for breathing treatments.

## 2019-09-21 ENCOUNTER — TELEPHONE (OUTPATIENT)
Dept: MEDICAL GROUP | Facility: MEDICAL CENTER | Age: 59
End: 2019-09-21

## 2019-09-21 DIAGNOSIS — K21.9 CHRONIC GERD: ICD-10-CM

## 2019-09-21 DIAGNOSIS — Z98.0 S/P TOTAL GASTRECTOMY AND ROUX-EN-Y ESOPHAGOJEJUNAL ANASTOMOSIS: ICD-10-CM

## 2019-09-21 DIAGNOSIS — J84.9 INTERSTITIAL LUNG DISEASE (HCC): ICD-10-CM

## 2019-09-21 DIAGNOSIS — T17.900S SILENT ASPIRATION, SEQUELA: ICD-10-CM

## 2019-09-21 DIAGNOSIS — Z90.3 S/P TOTAL GASTRECTOMY AND ROUX-EN-Y ESOPHAGOJEJUNAL ANASTOMOSIS: ICD-10-CM

## 2019-09-21 PROBLEM — T17.900A SILENT ASPIRATION: Status: ACTIVE | Noted: 2019-09-21

## 2019-09-21 LAB
CCP IGG SERPL-ACNC: 2 UNITS (ref 0–19)
ENA SM IGG SER-ACNC: 0 AU/ML (ref 0–40)
ENA SS-B IGG SER IA-ACNC: 3 AU/ML (ref 0–40)
NUCLEAR IGG SER QL IA: NORMAL
SSA52 R0ENA AB IGG Q0420: 1 AU/ML (ref 0–40)
SSA60 R0ENA AB IGG Q0419: 7 AU/ML (ref 0–40)

## 2019-09-21 NOTE — TELEPHONE ENCOUNTER
Referral to Dr. Ganser for possible silent aspiration from prior Kennedy en Y surgery.     JENNIFER Orr.

## 2019-09-21 NOTE — PROGRESS NOTES
Pt discharged to home via personal vehicle with spouse. Discharge paperwork reviewed and signed. VSS. Pt escorted off unit with hospital staff.

## 2019-09-21 NOTE — PROGRESS NOTES
Pulmonary  Progress Note    Date of admission  9/18/2019    Chief Complaint  59 y.o. female admitted 9/18/2019 with SOB cough and hypoxemia   Actually she was sent from clinic     Hospital Course    admitted to floor  Treated with O2 and BDs   No steroids or antibiotics given       Interval Problem Update  Reviewed last 24 hour events:  Pt was planned to have HRCT unfortunately CT machine is down   Improved in terms on symptoms and exam      Review of Systems  ROS   Constitutional: Negative for chills, diaphoresis, fever, malaise/fatigue and weight loss.   HENT: Positive for congestion. Negative for ear discharge, ear pain, hearing loss, nosebleeds, sinus pain, sore throat and tinnitus.    Eyes: Negative for blurred vision, double vision, photophobia, pain, discharge and redness.   Respiratory: Positive for cough, sputum production and shortness of breath. Negative for hemoptysis, wheezing and stridor.    Cardiovascular: Negative for chest pain, palpitations, orthopnea, claudication, leg swelling and PND.   Gastrointestinal: Negative for abdominal pain, constipation, diarrhea, heartburn, nausea and vomiting.   Genitourinary: Negative for dysuria and urgency.   Musculoskeletal: Negative for back pain, falls, joint pain, myalgias and neck pain.   Skin: Negative for itching and rash.   Neurological:  Negative for dizziness, tremors, speech change, focal weakness and weakness.   Endo/Heme/Allergies: Negative for environmental allergies.   Psychiatric/Behavioral: Negative for depression.   Vital Signs for last 24 hours   Temp:  [36.5 °C (97.7 °F)-36.8 °C (98.2 °F)] 36.7 °C (98 °F)  Pulse:  [57-78] 76  Resp:  [18-20] 20  BP: (121-135)/(64-75) 121/64  SpO2:  [90 %-95 %] 91 %    Hemodynamic parameters for last 24 hours       Respiratory Information for the last 24 hours       Physical Exam   Physical Exam  Constitutional: She is oriented to person, place, and time. She appears well-developed and well-nourished.   HENT:    Head: Normocephalic and atraumatic.   Right Ear: External ear normal.   Left Ear: External ear normal.   Mouth/Throat: Oropharynx is clear and moist. No oropharyngeal exudate.   Eyes: Pupils are equal, round, and reactive to light. Conjunctivae and EOM are normal. No scleral icterus.   Neck: Normal range of motion. Neck supple. No JVD present. No tracheal deviation present.   Cardiovascular: Normal rate, regular rhythm and normal heart sounds. Exam reveals no gallop and no friction rub.   No murmur heard.  Pulmonary/Chest: Effort normal. No accessory muscle usage. No respiratory distress. She has no wheezes. She has rales.   Abdominal: Soft. She exhibits no distension. There is no tenderness.   Musculoskeletal: Normal range of motion. She exhibits no edema, tenderness or deformity.   Lymphadenopathy:     She has no cervical adenopathy.   Neurological: She is alert and oriented to person, place, and time. No cranial nerve deficit. Gait normal.   Skin: Skin is warm and dry. No rash noted. No cyanosis. Nails show no clubbing.   Psychiatric: She has a normal mood and affect.   Medications  No current facility-administered medications for this encounter.      Current Outpatient Medications   Medication Sig Dispense Refill   • omeprazole (PRILOSEC) 40 MG delayed-release capsule Take 1 Cap by mouth every day. 30 Cap 1   • amoxicillin-clavulanate (AUGMENTIN) 875-125 MG Tab Take 1 Tab by mouth 2 times a day for 7 days. 14 Tab 0   • levothyroxine (SYNTHROID) 88 MCG Tab TAKE ONE TABLET BY MOUTH ONCE DAILY IN THE MORNING FOR THYROID. TAKE ON EMPTY STOMACH. DO NOT EAT FOR 30 MINUTES  5   • fluticasone (FLOVENT HFA) 110 MCG/ACT Aerosol Inhale 2 Puffs by mouth every 12 hours. 1 Inhaler 6   • albuterol 108 (90 Base) MCG/ACT Aero Soln inhalation aerosol Inhale 2 Puffs by mouth every 6 hours as needed for Shortness of Breath.     • Omega-3 Fatty Acids (FISH OIL) 1000 MG Cap capsule Take 1,000 mg by mouth every day.     • vitamin D  (CHOLECALCIFEROL) 1000 UNIT Tab Take 500 Units by mouth every day.     • therapeutic multivitamin-minerals (THERAGRAN-M) Tab Take 1 Tab by mouth every day.     • magnesium gluconate (MAG-G) 500 MG tablet Take 500 mg by mouth every day.     • cyanocobalamin (VITAMIN B-12) 500 MCG Tab Take 500 mcg by mouth every day.     • lisinopril (PRINIVIL) 20 MG TABS Take 20 mg by mouth every day.         Fluids    Intake/Output Summary (Last 24 hours) at 9/20/2019 2213  Last data filed at 9/20/2019 1742  Gross per 24 hour   Intake 1560 ml   Output --   Net 1560 ml       Laboratory          Recent Labs     09/19/19  0204   SODIUM 140   POTASSIUM 4.1   CHLORIDE 103   CO2 24   BUN 9   CREATININE 0.67   CALCIUM 9.4     Recent Labs     09/19/19  0204   ALTSGPT 14   ASTSGOT 14   ALKPHOSPHAT 53   TBILIRUBIN 0.4   GLUCOSE 155*     Recent Labs     09/19/19  0204   WBC 9.8   NEUTSPOLYS 92.70*   LYMPHOCYTES 4.70*   MONOCYTES 1.20   EOSINOPHILS 0.10   BASOPHILS 1.00   ASTSGOT 14   ALTSGPT 14   ALKPHOSPHAT 53   TBILIRUBIN 0.4     Recent Labs     09/19/19  0204   RBC 5.67*   HEMOGLOBIN 16.8*   HEMATOCRIT 50.3*   PLATELETCT 314       Imaging  X-Ray:  I have personally reviewed the images and compared with prior images.    Assessment/Plan  * Acute on chronic respiratory failure (HCC)- (present on admission)  Assessment & Plan  This is probably similar to the patient's previous problem with diffuse groundglass opacities on CT scan.  Even the patient's chest x-ray was unremarkable subtle changes in lung parenchyma may not be evident on chest x-ray.  The patient physical exam is consistent with inspiratory crackles especially in the middle and upper lung fields  Serology for auto immune etiology is sent   Not able to CT chest because machine is down   BNP slightly elevated   Continue bronchodilators.  No antibiotics and no systemic steroids at this point until we are able to narrow down the differential diagnosis  Continue oxygen supplementations  as needed to keep O2 saturation above 90%    Moderate persistent asthma- (present on admission)  Assessment & Plan  The patient does not seem to be an asthma exacerbation by her exam, we will hold systemic steroids for now continue inhaled steroids and respiratory protocol    Interstitial lung disease (HCC)  Assessment & Plan  Diffuse groundglass opacities seen on CT scan on April 2019.  Initially they were thought to be secondary to pneumonia.  This groundglass opacity resolved and repeat CT scan few months later.  Patient current presentation and physical exam very suggestive of recurrence of the same problem.  The patient is afebrile, she she has normal procalcitonin.  Bacterial infection as the culprit of her problem is probably unlikely.  The patient could have chronic aspiration without infection causing the symptoms especially given her history of gastric bypass surgery.  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 In review of yesterday's note (9/19/2019), there are no changes except as documented above.     Discussed patient condition and risk of morbidity and/or mortality with Hospitalist and Patient

## 2019-09-22 DIAGNOSIS — J84.9 ILD (INTERSTITIAL LUNG DISEASE) (HCC): ICD-10-CM

## 2019-09-23 ENCOUNTER — TELEPHONE (OUTPATIENT)
Dept: PULMONOLOGY | Facility: HOSPICE | Age: 59
End: 2019-09-23

## 2019-09-23 ENCOUNTER — PATIENT OUTREACH (OUTPATIENT)
Dept: HEALTH INFORMATION MANAGEMENT | Facility: OTHER | Age: 59
End: 2019-09-23

## 2019-09-23 DIAGNOSIS — J96.01 ACUTE RESPIRATORY FAILURE WITH HYPOXIA (HCC): ICD-10-CM

## 2019-09-23 NOTE — TELEPHONE ENCOUNTER
LVM informed HRCt order placed to be complete soon per Dr. Pitt. Encouraged to call imaging to schedule will mail order to address on file if wish to complete elsewhere.

## 2019-09-23 NOTE — TELEPHONE ENCOUNTER
----- Message from Irais Pitt M.D. sent at 9/23/2019  8:16 AM PDT -----  Can we get her in with HRCT sooner rather than later?  I will put order in.   ----- Message -----  From: Rosa Elena Richard M.D.  Sent: 9/22/2019   9:08 AM PDT  To: Irais Pitt M.D.    Hi Dr. Pitt  We discharged this lady after she had some clinical improvement.  She was sent home on home oxygen.  Unfortunately we were not able to do high-resolution CT scan because the machine was down.  I ordered outpatient high-resolution CT chest.  She will need close follow-up.  If you please ask your medical assistance to arrange for follow-up with you after she does the CT scan

## 2019-09-25 ENCOUNTER — OFFICE VISIT (OUTPATIENT)
Dept: MEDICAL GROUP | Facility: MEDICAL CENTER | Age: 59
End: 2019-09-25
Payer: COMMERCIAL

## 2019-09-25 ENCOUNTER — HOSPITAL ENCOUNTER (OUTPATIENT)
Dept: RADIOLOGY | Facility: MEDICAL CENTER | Age: 59
End: 2019-09-25
Attending: FAMILY MEDICINE
Payer: COMMERCIAL

## 2019-09-25 VITALS
TEMPERATURE: 97.3 F | BODY MASS INDEX: 24.34 KG/M2 | HEIGHT: 70 IN | SYSTOLIC BLOOD PRESSURE: 124 MMHG | DIASTOLIC BLOOD PRESSURE: 62 MMHG | RESPIRATION RATE: 16 BRPM | OXYGEN SATURATION: 94 % | HEART RATE: 92 BPM | WEIGHT: 170 LBS

## 2019-09-25 DIAGNOSIS — J96.11 CHRONIC RESPIRATORY FAILURE WITH HYPOXIA (HCC): ICD-10-CM

## 2019-09-25 DIAGNOSIS — M79.89 PAIN AND SWELLING OF LEFT UPPER EXTREMITY: ICD-10-CM

## 2019-09-25 DIAGNOSIS — K21.9 CHRONIC GERD: ICD-10-CM

## 2019-09-25 DIAGNOSIS — R73.03 PREDIABETES: ICD-10-CM

## 2019-09-25 DIAGNOSIS — Z09 HOSPITAL DISCHARGE FOLLOW-UP: ICD-10-CM

## 2019-09-25 DIAGNOSIS — M79.602 PAIN AND SWELLING OF LEFT UPPER EXTREMITY: ICD-10-CM

## 2019-09-25 DIAGNOSIS — J84.9 INTERSTITIAL LUNG DISEASE (HCC): ICD-10-CM

## 2019-09-25 PROBLEM — E11.9 TYPE 2 DIABETES MELLITUS WITHOUT COMPLICATION, WITHOUT LONG-TERM CURRENT USE OF INSULIN (HCC): Status: RESOLVED | Noted: 2019-07-18 | Resolved: 2019-09-25

## 2019-09-25 PROCEDURE — 99215 OFFICE O/P EST HI 40 MIN: CPT | Performed by: FAMILY MEDICINE

## 2019-09-25 PROCEDURE — 93971 EXTREMITY STUDY: CPT | Mod: 26 | Performed by: INTERNAL MEDICINE

## 2019-09-25 PROCEDURE — 93971 EXTREMITY STUDY: CPT | Mod: LT

## 2019-09-25 NOTE — PATIENT INSTRUCTIONS
If you need further assistance, or have any questions; concerns or lingering symptoms before seeing your Primary Care Provider or specialist.     Do not hesitate to contact us.     Please contact us at the Post-Hospital Follow Up Program at [phone] and ask for the Medical Assistant for Dr Edwards.   Our offices hours are Monday-Friday 8 am-5 pm.

## 2019-09-25 NOTE — PROGRESS NOTES
Subjective:     Lillian Addison is a 59 y.o. female who presents for Hospital Follow-up.  Chart and discharge summary reviewed.   Date of discharge 9/20/2019.  48- hour post discharge RN call completed on 9/23/2019 and documented in the medical record by Shereen Live RN:  POST DISCHARGE CALL:  Discharge Date:9/20/2019   Date of Outreach Call: 9/23/2019 10:55 AM  Now that you're home, how are you doing? Fair  Comment:Pt reports she is feeling better. States she is  back to work today. Reports wearing oxygen at .5 liters.  States she is following with pulmonary provider.  Do you have questions about your medications? No    Did you fill your medications? Yes    Do you have a follow-up appointment scheduled?Yes  Comment:Post discharge program 9/25    Discharging Department: [facility]    Number of Attempts: 1  Current or previous attempts completed within two business days of discharge? Yes  Provided education regarding treatment plan, medication, self-management, ADLs? Yes  Has patient completed Advance Directive? If yes, advise them to bring to appointment. No  Care Manager phone number provided? Yes  Is there anything else I can help you with? No        HPI: Recently hospitalized for acute on chronic respiratory failure.  She is a 59-year old white female who has been struggling with respiratory problems for the past several months.  She has a 20-pack-year history of smoking and quit in 1994.  She was treated for severe community-acquired pneumonia in April CT scan showed bilateral ground glass opacities (suggestive of interstitial lung disease) which cleared up and a follow-up CT scan in June although she continued to have breathing difficulties.  She was placed on inhalers and improved initially but developed worsening hypoxia.  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  Upper GI was done which showed GERD and esophageal dysmotility.  She  was started on omeprazole and counseled on anti-GERD measures.  It was suggested that she may have chronic silent aspiration due to reflux.  She was advised to see Dr. Ganser to evaluate for possible revision of her prior surgery.  She called Dr. Ganser's office and was informed that she would not be seen until they received the records from her prior surgery.  She was discharged on oxygen and Augmentin.    Since returning home, patient reports feeling fair.  She says her left forearm has been red and painful for the past 6 days since an IV catheter was inserted.     The patient has questions regarding hospitalization or discharge: All of her questions were answered.  The patient denies weakness; denies difficulty taking care of self at home.  Patient reports taking medications as instructed.    Current Outpatient Medications   Medication Sig Dispense Refill   • omeprazole (PRILOSEC) 40 MG delayed-release capsule Take 1 Cap by mouth every day. 30 Cap 1   • amoxicillin-clavulanate (AUGMENTIN) 875-125 MG Tab Take 1 Tab by mouth 2 times a day for 7 days. 14 Tab 0   • levothyroxine (SYNTHROID) 88 MCG Tab TAKE ONE TABLET BY MOUTH ONCE DAILY IN THE MORNING FOR THYROID. TAKE ON EMPTY STOMACH. DO NOT EAT FOR 30 MINUTES  5   • fluticasone (FLOVENT HFA) 110 MCG/ACT Aerosol Inhale 2 Puffs by mouth every 12 hours. 1 Inhaler 6   • albuterol 108 (90 Base) MCG/ACT Aero Soln inhalation aerosol Inhale 2 Puffs by mouth every 6 hours as needed for Shortness of Breath.     • Omega-3 Fatty Acids (FISH OIL) 1000 MG Cap capsule Take 1,000 mg by mouth every day.     • vitamin D (CHOLECALCIFEROL) 1000 UNIT Tab Take 500 Units by mouth every day.     • therapeutic multivitamin-minerals (THERAGRAN-M) Tab Take 1 Tab by mouth every day.     • magnesium gluconate (MAG-G) 500 MG tablet Take 500 mg by mouth every day.     • cyanocobalamin (VITAMIN B-12) 500 MCG Tab Take 500 mcg by mouth every day.     • lisinopril (PRINIVIL) 20 MG TABS Take 20 mg  by mouth every day.       No current facility-administered medications for this visit.        Patient Active Problem List    Diagnosis Date Noted   • Acute on chronic respiratory failure (HCC) 2019     Priority: High   • Moderate persistent asthma 2019     Priority: Medium   • S/P total gastrectomy and Kennedy-en-Y esophagojejunal anastomosis 2019   • Silent aspiration 2019   • Chronic GERD 2019   • Interstitial lung disease (HCC) 2019   • Essential hypertension 2019   • Postoperative hypothyroidism 2019   • Type 2 diabetes mellitus without complication, without long-term current use of insulin (HCC) 2019   • Mixed hyperlipidemia 2019   • Family history of breast cancer 2019   • Primary insomnia 2019       Allergies:   Codeine and Sulfa drugs    Social History:  Social History     Tobacco Use   • Smoking status: Former Smoker     Packs/day: 1.00     Years: 20.00     Pack years: 20.00     Types: Cigarettes     Last attempt to quit: 1994     Years since quittin.7   • Smokeless tobacco: Never Used   Substance Use Topics   • Alcohol use: Yes     Alcohol/week: 0.6 - 1.2 oz     Types: 1 - 2 Shots of liquor per week     Comment: 1-2x per week, 1-2 drinks        Family History:  Family History   Problem Relation Age of Onset   • Cancer Mother 55        breast   • Heart Disease Mother         MI   • Cancer Maternal Grandmother         breast   • Cancer Sister         breast   • Diabetes Father    • Cancer Father         SCC lung       Past Medical History:   Diagnosis Date   • Asthma    • Diabetes (HCC)    • Hyperlipidemia    • Hypertension    • Thyroid disease        Surgical History  Past Surgical History:   Procedure Laterality Date   • BRONCHOSCOPY-ENDO  2019    Procedure: BRONCHOSCOPY;  Surgeon: Holly Velasquez M.D.;  Location: SURGERY [facility];  Service: Pulmonary   • BRONCHOSCOPY/NDL BX  2019    Procedure: BRONCHOSCOPY,  "WITH NEEDLE BIOPSY-  TRANSBRONCHIAL BX;  Surgeon: Holly Velasquez M.D.;  Location: SURGERY [facility];  Service: Pulmonary   • GASTRECTOMY  2006    Rou-En-Y   • PB C-SEC ONLY,PBEV C-SEC     • PRIMARY C SECTION         ROS:    Constitutional:  Denies fever, chills, night sweats, fatigue or malaise  HENT: Denies head congestion, ear pain or drainage, decreased hearing, sore throat  Eyes: Denies visual changes, eye drainage or redness, eye pain  Neck: Denies pain, swollen glands, decreased range of motion  Lungs: Has shortness of breath without oxygen.  Cardiovascular: Denies chest pain, orthopnea, lower extremity edema, palpitations  Abdominal: Denies abdominal pain, change in bowel or bladder habits, nausea or vomiting  Musculoskeletal: Denies back pain, joint pains, decreased range of motion  Endocrine: Denies unexplained weight changes, heat or cold intolerance, hair loss, polyuria or polydipsia  Neurological: Denies dizziness, headache, confusion, focal weakness or numbness, memory loss  Psychiatric: Denies depression, anxiety, insomnia       Objective:     /62 (BP Location: Right arm, Patient Position: Sitting, BP Cuff Size: Adult)   Pulse 92   Temp 36.3 °C (97.3 °F) (Temporal)   Resp 16   Ht 1.778 m (5' 10\")   Wt 77.1 kg (170 lb)   SpO2 94%  (1 L O2 nasal cannula)    Physical Exam:    GEN:  Alert, oriented, in no distress  HEENT:  PERRLA, EOMI  NECK;  Supple without cervical adenopathy   LUNGS: Diffuse inspiratory and expiratory crackles.  CV:  Heart RRR without murmurs or S3 or S4  EXT:  Without cyanosis, clubbing, or lower extremity edema.  There is erythema and tenderness to palpation of the left forearm surrounding the cephalic vein.  NEURO:  Cranial nerves II through XII intact.  Motor function and sensation intact.  SKIN: No rashes or suspicious lesions.  PSYCH:  Behavior is appropriate.      Assessment and Plan:     1. Hospital follow-up:   Hospitalization and results reviewed with " patient. High risk conditions requiring teaching or care coordination were identified and addressed.The patient demonstrate understanding of admission and underlying conditions. The patient understands discharge instructions and when to seek medical attention. Medications reviewed including instructions regarding high risk medications, dosing and side effects.    The patient is able to safely adhere to ADL/IADL, treatment and medication regimen, self-manage of high-risk conditions? Yes  The patient requires physical therapy/home health/DME referral? No   The patient requires referral to care coordination/behavioral health/social work?  No   Patient requires referral for pharmacy consult? No     2. Chronic respiratory failure with hypoxia (HCC)  -Continue oxygen 24/7.    3. Pain and swelling of left upper extremity  - US-EXTREMITY VENOUS UPPER UNILAT LEFT; Future. Scheduled today.    4. Interstitial lung disease (HCC)  -High-resolution CT scan scheduled in 2 days.    5. Prediabetes  -Hemoglobin A1c 6.2.  Patient is maintaining healthy eating habits.    5. Chronic GERD with esophageal dysmotility.  -She will continue omeprazole and antireflux measures.  She has requested her old medical records to be sent to Dr. Ganser's office.        Medication Reconciliation  Medication list at end of encounter:   Current Outpatient Medications   Medication Sig Dispense Refill   • omeprazole (PRILOSEC) 40 MG delayed-release capsule Take 1 Cap by mouth every day. 30 Cap 1   • amoxicillin-clavulanate (AUGMENTIN) 875-125 MG Tab Take 1 Tab by mouth 2 times a day for 7 days. 14 Tab 0   • levothyroxine (SYNTHROID) 88 MCG Tab TAKE ONE TABLET BY MOUTH ONCE DAILY IN THE MORNING FOR THYROID. TAKE ON EMPTY STOMACH. DO NOT EAT FOR 30 MINUTES  5   • fluticasone (FLOVENT HFA) 110 MCG/ACT Aerosol Inhale 2 Puffs by mouth every 12 hours. 1 Inhaler 6   • albuterol 108 (90 Base) MCG/ACT Aero Soln inhalation aerosol Inhale 2 Puffs by mouth every 6  hours as needed for Shortness of Breath.     • Omega-3 Fatty Acids (FISH OIL) 1000 MG Cap capsule Take 1,000 mg by mouth every day.     • vitamin D (CHOLECALCIFEROL) 1000 UNIT Tab Take 500 Units by mouth every day.     • therapeutic multivitamin-minerals (THERAGRAN-M) Tab Take 1 Tab by mouth every day.     • magnesium gluconate (MAG-G) 500 MG tablet Take 500 mg by mouth every day.     • cyanocobalamin (VITAMIN B-12) 500 MCG Tab Take 500 mcg by mouth every day.     • lisinopril (PRINIVIL) 20 MG TABS Take 20 mg by mouth every day.       No current facility-administered medications for this visit.        Primary care follow-up:    Recommended followup:  with new PCP JENNIFER Jean  Future Appointments       Provider Department Center    9/27/2019 8:00 AM [facility] CT 2 [facility] IMAGING - CT - [facility] [place]    12/16/2019 4:15 PM Irais Pitt M.D. [facility] Pulmonary Medicine           Patient Instruction  Patient provided with educational material on discharge diagnosis and management of symptoms/red flags. Patient instructed to keep follow-up appointments and to bring written questions and and actual medications to each office visit. Patient instructed to call PCP/specialist with any problems/questions/concerns. Patient verbalizes understanding and has no further questions at this time.    Total of 40 minutes face-to-face time was spent with patient, with greater than 50% of the time spent in counseling regarding her test results, care management, management of GERD, and coordination of care in arranging upper extremity venous ultrasound and obtaining an appointment for her to establish with a new PCP.

## 2019-09-27 ENCOUNTER — HOSPITAL ENCOUNTER (OUTPATIENT)
Dept: RADIOLOGY | Facility: MEDICAL CENTER | Age: 59
End: 2019-09-27
Attending: INTERNAL MEDICINE
Payer: COMMERCIAL

## 2019-09-27 DIAGNOSIS — J96.01 ACUTE RESPIRATORY FAILURE WITH HYPOXIA (HCC): ICD-10-CM

## 2019-09-27 PROCEDURE — 71250 CT THORAX DX C-: CPT

## 2019-10-02 DIAGNOSIS — J96.01 ACUTE RESPIRATORY FAILURE WITH HYPOXIA (HCC): ICD-10-CM

## 2019-10-13 ENCOUNTER — APPOINTMENT (OUTPATIENT)
Dept: RADIOLOGY | Facility: MEDICAL CENTER | Age: 59
DRG: 871 | End: 2019-10-13
Attending: EMERGENCY MEDICINE
Payer: COMMERCIAL

## 2019-10-13 ENCOUNTER — HOSPITAL ENCOUNTER (INPATIENT)
Facility: MEDICAL CENTER | Age: 59
LOS: 2 days | DRG: 871 | End: 2019-10-15
Attending: EMERGENCY MEDICINE | Admitting: INTERNAL MEDICINE
Payer: COMMERCIAL

## 2019-10-13 DIAGNOSIS — J18.9 PNEUMONIA DUE TO INFECTIOUS ORGANISM, UNSPECIFIED LATERALITY, UNSPECIFIED PART OF LUNG: ICD-10-CM

## 2019-10-13 DIAGNOSIS — J96.01 ACUTE RESPIRATORY FAILURE WITH HYPOXIA (HCC): ICD-10-CM

## 2019-10-13 DIAGNOSIS — J84.9 INTERSTITIAL LUNG DISEASE (HCC): ICD-10-CM

## 2019-10-13 PROBLEM — A41.9 SEPSIS (HCC): Status: ACTIVE | Noted: 2019-10-13

## 2019-10-13 LAB
ALBUMIN SERPL BCP-MCNC: 3.5 G/DL (ref 3.2–4.9)
ALBUMIN/GLOB SERPL: 0.7 G/DL
ALP SERPL-CCNC: 66 U/L (ref 30–99)
ALT SERPL-CCNC: 18 U/L (ref 2–50)
ANION GAP SERPL CALC-SCNC: 15 MMOL/L (ref 0–11.9)
APPEARANCE UR: CLEAR
AST SERPL-CCNC: 20 U/L (ref 12–45)
BASOPHILS # BLD AUTO: 0.6 % (ref 0–1.8)
BASOPHILS # BLD: 0.12 K/UL (ref 0–0.12)
BILIRUB SERPL-MCNC: 0.7 MG/DL (ref 0.1–1.5)
BILIRUB UR QL STRIP.AUTO: ABNORMAL
BUN SERPL-MCNC: 11 MG/DL (ref 8–22)
CALCIUM SERPL-MCNC: 9.3 MG/DL (ref 8.4–10.2)
CHLORIDE SERPL-SCNC: 105 MMOL/L (ref 96–112)
CO2 SERPL-SCNC: 20 MMOL/L (ref 20–33)
COLOR UR: YELLOW
CREAT SERPL-MCNC: 0.59 MG/DL (ref 0.5–1.4)
D DIMER PPP IA.FEU-MCNC: 0.75 UG/ML (FEU) (ref 0–0.5)
EKG IMPRESSION: NORMAL
EOSINOPHIL # BLD AUTO: 0.06 K/UL (ref 0–0.51)
EOSINOPHIL NFR BLD: 0.3 % (ref 0–6.9)
ERYTHROCYTE [DISTWIDTH] IN BLOOD BY AUTOMATED COUNT: 47.3 FL (ref 35.9–50)
FLUAV RNA SPEC QL NAA+PROBE: NEGATIVE
FLUBV RNA SPEC QL NAA+PROBE: NEGATIVE
GLOBULIN SER CALC-MCNC: 4.9 G/DL (ref 1.9–3.5)
GLUCOSE SERPL-MCNC: 116 MG/DL (ref 65–99)
GLUCOSE UR STRIP.AUTO-MCNC: NEGATIVE MG/DL
HCT VFR BLD AUTO: 50.7 % (ref 37–47)
HGB BLD-MCNC: 17.1 G/DL (ref 12–16)
IMM GRANULOCYTES # BLD AUTO: 0.08 K/UL (ref 0–0.11)
IMM GRANULOCYTES NFR BLD AUTO: 0.4 % (ref 0–0.9)
INR PPP: 0.93 (ref 0.87–1.13)
KETONES UR STRIP.AUTO-MCNC: >=80 MG/DL
LACTATE BLD-SCNC: 1.5 MMOL/L (ref 0.5–2)
LACTATE BLD-SCNC: 1.6 MMOL/L (ref 0.5–2)
LACTATE BLD-SCNC: 2.3 MMOL/L (ref 0.5–2)
LEUKOCYTE ESTERASE UR QL STRIP.AUTO: NEGATIVE
LYMPHOCYTES # BLD AUTO: 0.52 K/UL (ref 1–4.8)
LYMPHOCYTES NFR BLD: 2.7 % (ref 22–41)
MCH RBC QN AUTO: 29.7 PG (ref 27–33)
MCHC RBC AUTO-ENTMCNC: 33.7 G/DL (ref 33.6–35)
MCV RBC AUTO: 88.2 FL (ref 81.4–97.8)
MICRO URNS: ABNORMAL
MONOCYTES # BLD AUTO: 0.75 K/UL (ref 0–0.85)
MONOCYTES NFR BLD AUTO: 3.9 % (ref 0–13.4)
NEUTROPHILS # BLD AUTO: 17.49 K/UL (ref 2–7.15)
NEUTROPHILS NFR BLD: 92.1 % (ref 44–72)
NITRITE UR QL STRIP.AUTO: NEGATIVE
NRBC # BLD AUTO: 0 K/UL
NRBC BLD-RTO: 0 /100 WBC
NT-PROBNP SERPL IA-MCNC: 121 PG/ML (ref 0–125)
PH UR STRIP.AUTO: 6.5 [PH] (ref 5–8)
PLATELET # BLD AUTO: 264 K/UL (ref 164–446)
PMV BLD AUTO: 9.2 FL (ref 9–12.9)
POTASSIUM SERPL-SCNC: 3.9 MMOL/L (ref 3.6–5.5)
PROCALCITONIN SERPL-MCNC: 0.21 NG/ML
PROT SERPL-MCNC: 8.4 G/DL (ref 6–8.2)
PROT UR QL STRIP: NEGATIVE MG/DL
PROTHROMBIN TIME: 12.6 SEC (ref 12–14.6)
RBC # BLD AUTO: 5.75 M/UL (ref 4.2–5.4)
RBC UR QL AUTO: NEGATIVE
SODIUM SERPL-SCNC: 140 MMOL/L (ref 135–145)
SP GR UR STRIP.AUTO: 1.01
TROPONIN T SERPL-MCNC: 7 NG/L (ref 6–19)
WBC # BLD AUTO: 19 K/UL (ref 4.8–10.8)

## 2019-10-13 PROCEDURE — 94640 AIRWAY INHALATION TREATMENT: CPT

## 2019-10-13 PROCEDURE — 71045 X-RAY EXAM CHEST 1 VIEW: CPT

## 2019-10-13 PROCEDURE — 80053 COMPREHEN METABOLIC PANEL: CPT

## 2019-10-13 PROCEDURE — A9270 NON-COVERED ITEM OR SERVICE: HCPCS | Performed by: INTERNAL MEDICINE

## 2019-10-13 PROCEDURE — 87040 BLOOD CULTURE FOR BACTERIA: CPT | Mod: 91

## 2019-10-13 PROCEDURE — 770020 HCHG ROOM/CARE - TELE (206)

## 2019-10-13 PROCEDURE — 700101 HCHG RX REV CODE 250: Performed by: INTERNAL MEDICINE

## 2019-10-13 PROCEDURE — 700111 HCHG RX REV CODE 636 W/ 250 OVERRIDE (IP): Performed by: EMERGENCY MEDICINE

## 2019-10-13 PROCEDURE — 93005 ELECTROCARDIOGRAM TRACING: CPT | Performed by: EMERGENCY MEDICINE

## 2019-10-13 PROCEDURE — 84145 PROCALCITONIN (PCT): CPT

## 2019-10-13 PROCEDURE — 99285 EMERGENCY DEPT VISIT HI MDM: CPT

## 2019-10-13 PROCEDURE — A9270 NON-COVERED ITEM OR SERVICE: HCPCS

## 2019-10-13 PROCEDURE — 700111 HCHG RX REV CODE 636 W/ 250 OVERRIDE (IP): Performed by: INTERNAL MEDICINE

## 2019-10-13 PROCEDURE — 36415 COLL VENOUS BLD VENIPUNCTURE: CPT

## 2019-10-13 PROCEDURE — 700102 HCHG RX REV CODE 250 W/ 637 OVERRIDE(OP)

## 2019-10-13 PROCEDURE — 700105 HCHG RX REV CODE 258: Performed by: INTERNAL MEDICINE

## 2019-10-13 PROCEDURE — 94668 MNPJ CHEST WALL SBSQ: CPT

## 2019-10-13 PROCEDURE — 85025 COMPLETE CBC W/AUTO DIFF WBC: CPT

## 2019-10-13 PROCEDURE — 84484 ASSAY OF TROPONIN QUANT: CPT

## 2019-10-13 PROCEDURE — 87086 URINE CULTURE/COLONY COUNT: CPT

## 2019-10-13 PROCEDURE — 700102 HCHG RX REV CODE 250 W/ 637 OVERRIDE(OP): Performed by: INTERNAL MEDICINE

## 2019-10-13 PROCEDURE — 85379 FIBRIN DEGRADATION QUANT: CPT

## 2019-10-13 PROCEDURE — 81003 URINALYSIS AUTO W/O SCOPE: CPT

## 2019-10-13 PROCEDURE — 304561 HCHG STAT O2

## 2019-10-13 PROCEDURE — 87502 INFLUENZA DNA AMP PROBE: CPT

## 2019-10-13 PROCEDURE — 94667 MNPJ CHEST WALL 1ST: CPT

## 2019-10-13 PROCEDURE — 85610 PROTHROMBIN TIME: CPT

## 2019-10-13 PROCEDURE — 96365 THER/PROPH/DIAG IV INF INIT: CPT

## 2019-10-13 PROCEDURE — 83605 ASSAY OF LACTIC ACID: CPT | Mod: 91

## 2019-10-13 PROCEDURE — 83880 ASSAY OF NATRIURETIC PEPTIDE: CPT

## 2019-10-13 PROCEDURE — 99223 1ST HOSP IP/OBS HIGH 75: CPT | Performed by: INTERNAL MEDICINE

## 2019-10-13 PROCEDURE — 96368 THER/DIAG CONCURRENT INF: CPT

## 2019-10-13 PROCEDURE — 82785 ASSAY OF IGE: CPT

## 2019-10-13 PROCEDURE — 700105 HCHG RX REV CODE 258: Performed by: EMERGENCY MEDICINE

## 2019-10-13 PROCEDURE — 94760 N-INVAS EAR/PLS OXIMETRY 1: CPT

## 2019-10-13 RX ORDER — LEVOTHYROXINE SODIUM 88 UG/1
88 TABLET ORAL
COMMUNITY
End: 2019-12-17 | Stop reason: SDUPTHER

## 2019-10-13 RX ORDER — ONDANSETRON 4 MG/1
4 TABLET, ORALLY DISINTEGRATING ORAL EVERY 4 HOURS PRN
Status: DISCONTINUED | OUTPATIENT
Start: 2019-10-13 | End: 2019-10-15 | Stop reason: HOSPADM

## 2019-10-13 RX ORDER — ONDANSETRON 2 MG/ML
4 INJECTION INTRAMUSCULAR; INTRAVENOUS EVERY 4 HOURS PRN
Status: DISCONTINUED | OUTPATIENT
Start: 2019-10-13 | End: 2019-10-15 | Stop reason: HOSPADM

## 2019-10-13 RX ORDER — PROCHLORPERAZINE EDISYLATE 5 MG/ML
5-10 INJECTION INTRAMUSCULAR; INTRAVENOUS EVERY 4 HOURS PRN
Status: DISCONTINUED | OUTPATIENT
Start: 2019-10-13 | End: 2019-10-15 | Stop reason: HOSPADM

## 2019-10-13 RX ORDER — FLUTICASONE PROPIONATE 110 UG/1
2 AEROSOL, METERED RESPIRATORY (INHALATION) EVERY 12 HOURS
Status: DISCONTINUED | OUTPATIENT
Start: 2019-10-13 | End: 2019-10-13

## 2019-10-13 RX ORDER — FLUTICASONE PROPIONATE 44 UG/1
AEROSOL, METERED RESPIRATORY (INHALATION)
Status: COMPLETED
Start: 2019-10-13 | End: 2019-10-13

## 2019-10-13 RX ORDER — POLYETHYLENE GLYCOL 3350 17 G/17G
1 POWDER, FOR SOLUTION ORAL
Status: DISCONTINUED | OUTPATIENT
Start: 2019-10-13 | End: 2019-10-15 | Stop reason: HOSPADM

## 2019-10-13 RX ORDER — IPRATROPIUM BROMIDE AND ALBUTEROL SULFATE 2.5; .5 MG/3ML; MG/3ML
3 SOLUTION RESPIRATORY (INHALATION)
Status: DISCONTINUED | OUTPATIENT
Start: 2019-10-13 | End: 2019-10-15 | Stop reason: HOSPADM

## 2019-10-13 RX ORDER — ACETAMINOPHEN 325 MG/1
650 TABLET ORAL EVERY 6 HOURS PRN
Status: DISCONTINUED | OUTPATIENT
Start: 2019-10-13 | End: 2019-10-15 | Stop reason: HOSPADM

## 2019-10-13 RX ORDER — SODIUM CHLORIDE 9 MG/ML
INJECTION, SOLUTION INTRAVENOUS CONTINUOUS
Status: DISCONTINUED | OUTPATIENT
Start: 2019-10-13 | End: 2019-10-15 | Stop reason: HOSPADM

## 2019-10-13 RX ORDER — AMOXICILLIN 250 MG
2 CAPSULE ORAL 2 TIMES DAILY
Status: DISCONTINUED | OUTPATIENT
Start: 2019-10-13 | End: 2019-10-15 | Stop reason: HOSPADM

## 2019-10-13 RX ORDER — AZITHROMYCIN 500 MG/1
500 INJECTION, POWDER, LYOPHILIZED, FOR SOLUTION INTRAVENOUS ONCE
Status: COMPLETED | OUTPATIENT
Start: 2019-10-13 | End: 2019-10-13

## 2019-10-13 RX ORDER — METHYLPREDNISOLONE SODIUM SUCCINATE 40 MG/ML
40 INJECTION, POWDER, LYOPHILIZED, FOR SOLUTION INTRAMUSCULAR; INTRAVENOUS EVERY 8 HOURS
Status: DISCONTINUED | OUTPATIENT
Start: 2019-10-13 | End: 2019-10-13

## 2019-10-13 RX ORDER — FLUTICASONE PROPIONATE 110 UG/1
2 AEROSOL, METERED RESPIRATORY (INHALATION)
Status: DISCONTINUED | OUTPATIENT
Start: 2019-10-13 | End: 2019-10-15 | Stop reason: HOSPADM

## 2019-10-13 RX ORDER — LEVOTHYROXINE SODIUM 88 UG/1
88 TABLET ORAL
Status: DISCONTINUED | OUTPATIENT
Start: 2019-10-13 | End: 2019-10-15 | Stop reason: HOSPADM

## 2019-10-13 RX ORDER — PREDNISONE 20 MG/1
40 TABLET ORAL DAILY
Status: DISCONTINUED | OUTPATIENT
Start: 2019-10-13 | End: 2019-10-14

## 2019-10-13 RX ORDER — PROMETHAZINE HYDROCHLORIDE 25 MG/1
12.5-25 TABLET ORAL EVERY 4 HOURS PRN
Status: DISCONTINUED | OUTPATIENT
Start: 2019-10-13 | End: 2019-10-15 | Stop reason: HOSPADM

## 2019-10-13 RX ORDER — IBUPROFEN 200 MG
400 TABLET ORAL EVERY 6 HOURS PRN
COMMUNITY

## 2019-10-13 RX ORDER — BISACODYL 10 MG
10 SUPPOSITORY, RECTAL RECTAL
Status: DISCONTINUED | OUTPATIENT
Start: 2019-10-13 | End: 2019-10-15 | Stop reason: HOSPADM

## 2019-10-13 RX ORDER — PROMETHAZINE HYDROCHLORIDE 25 MG/1
12.5-25 SUPPOSITORY RECTAL EVERY 4 HOURS PRN
Status: DISCONTINUED | OUTPATIENT
Start: 2019-10-13 | End: 2019-10-15 | Stop reason: HOSPADM

## 2019-10-13 RX ORDER — IPRATROPIUM BROMIDE AND ALBUTEROL SULFATE 2.5; .5 MG/3ML; MG/3ML
3 SOLUTION RESPIRATORY (INHALATION)
Status: DISCONTINUED | OUTPATIENT
Start: 2019-10-13 | End: 2019-10-14

## 2019-10-13 RX ORDER — OMEPRAZOLE 20 MG/1
20 CAPSULE, DELAYED RELEASE ORAL DAILY
Status: DISCONTINUED | OUTPATIENT
Start: 2019-10-13 | End: 2019-10-14

## 2019-10-13 RX ORDER — DEXLANSOPRAZOLE 60 MG/1
60 CAPSULE, DELAYED RELEASE ORAL DAILY
COMMUNITY
End: 2020-11-18

## 2019-10-13 RX ORDER — ALBUTEROL SULFATE 90 UG/1
2 AEROSOL, METERED RESPIRATORY (INHALATION) EVERY 6 HOURS PRN
Status: DISCONTINUED | OUTPATIENT
Start: 2019-10-13 | End: 2019-10-15 | Stop reason: HOSPADM

## 2019-10-13 RX ORDER — LISINOPRIL 20 MG/1
20 TABLET ORAL DAILY
Status: DISCONTINUED | OUTPATIENT
Start: 2019-10-13 | End: 2019-10-15 | Stop reason: HOSPADM

## 2019-10-13 RX ADMIN — SODIUM CHLORIDE: 9 INJECTION, SOLUTION INTRAVENOUS at 17:17

## 2019-10-13 RX ADMIN — IPRATROPIUM BROMIDE AND ALBUTEROL SULFATE 3 ML: .5; 3 SOLUTION RESPIRATORY (INHALATION) at 16:38

## 2019-10-13 RX ADMIN — ENOXAPARIN SODIUM 40 MG: 100 INJECTION SUBCUTANEOUS at 17:29

## 2019-10-13 RX ADMIN — IPRATROPIUM BROMIDE AND ALBUTEROL SULFATE 3 ML: .5; 3 SOLUTION RESPIRATORY (INHALATION) at 22:14

## 2019-10-13 RX ADMIN — PREDNISONE 40 MG: 20 TABLET ORAL at 18:39

## 2019-10-13 RX ADMIN — AZITHROMYCIN MONOHYDRATE 500 MG: 500 INJECTION, POWDER, LYOPHILIZED, FOR SOLUTION INTRAVENOUS at 13:43

## 2019-10-13 RX ADMIN — CEFTRIAXONE SODIUM 2 G: 2 INJECTION, POWDER, FOR SOLUTION INTRAMUSCULAR; INTRAVENOUS at 13:42

## 2019-10-13 RX ADMIN — FLUTICASONE PROPIONATE 2 PUFF: 44 AEROSOL, METERED RESPIRATORY (INHALATION) at 22:31

## 2019-10-13 RX ADMIN — CEFEPIME 2 G: 2 INJECTION, POWDER, FOR SOLUTION INTRAVENOUS at 17:23

## 2019-10-13 ASSESSMENT — LIFESTYLE VARIABLES
TOTAL SCORE: 0
EVER_SMOKED: YES
HOW MANY TIMES IN THE PAST YEAR HAVE YOU HAD 5 OR MORE DRINKS IN A DAY: 0
CONSUMPTION TOTAL: NEGATIVE
TOTAL SCORE: 0
TOTAL SCORE: 0
ALCOHOL_USE: YES
AVERAGE NUMBER OF DAYS PER WEEK YOU HAVE A DRINK CONTAINING ALCOHOL: 2
HAVE PEOPLE ANNOYED YOU BY CRITICIZING YOUR DRINKING: NO
HAVE YOU EVER FELT YOU SHOULD CUT DOWN ON YOUR DRINKING: NO
DOES PATIENT WANT TO STOP DRINKING: NO
EVER FELT BAD OR GUILTY ABOUT YOUR DRINKING: NO
EVER_SMOKED: YES
EVER HAD A DRINK FIRST THING IN THE MORNING TO STEADY YOUR NERVES TO GET RID OF A HANGOVER: NO
ON A TYPICAL DAY WHEN YOU DRINK ALCOHOL HOW MANY DRINKS DO YOU HAVE: 2

## 2019-10-13 ASSESSMENT — ENCOUNTER SYMPTOMS
BACK PAIN: 0
PALPITATIONS: 0
BLOOD IN STOOL: 0
FEVER: 0
HEARTBURN: 0
WHEEZING: 1
SHORTNESS OF BREATH: 1
FOCAL WEAKNESS: 0
MYALGIAS: 0
DIZZINESS: 0
DEPRESSION: 0
WEAKNESS: 0
SORE THROAT: 0
COUGH: 1
VOMITING: 0
HEADACHES: 0
ABDOMINAL PAIN: 0
NAUSEA: 0
CHILLS: 0
DIARRHEA: 0
HALLUCINATIONS: 0

## 2019-10-13 ASSESSMENT — COPD QUESTIONNAIRES
HAVE YOU SMOKED AT LEAST 100 CIGARETTES IN YOUR ENTIRE LIFE: YES
DO YOU EVER COUGH UP ANY MUCUS OR PHLEGM?: YES, A FEW DAYS A WEEK OR MONTH
IN THE PAST 12 MONTHS DO YOU DO LESS THAN YOU USED TO BECAUSE OF YOUR BREATHING PROBLEMS: AGREE
HAVE YOU SMOKED AT LEAST 100 CIGARETTES IN YOUR ENTIRE LIFE: YES
DURING THE PAST 4 WEEKS HOW MUCH DID YOU FEEL SHORT OF BREATH: SOME OF THE TIME
COPD SCREENING SCORE: 7
DURING THE PAST 4 WEEKS HOW MUCH DID YOU FEEL SHORT OF BREATH: SOME OF THE TIME
DO YOU EVER COUGH UP ANY MUCUS OR PHLEGM?: YES, A FEW DAYS A WEEK OR MONTH
COPD SCREENING SCORE: 6

## 2019-10-13 ASSESSMENT — COGNITIVE AND FUNCTIONAL STATUS - GENERAL
MOBILITY SCORE: 24
SUGGESTED CMS G CODE MODIFIER MOBILITY: CH
DAILY ACTIVITIY SCORE: 24
SUGGESTED CMS G CODE MODIFIER DAILY ACTIVITY: CH

## 2019-10-13 ASSESSMENT — PATIENT HEALTH QUESTIONNAIRE - PHQ9
2. FEELING DOWN, DEPRESSED, IRRITABLE, OR HOPELESS: NOT AT ALL
SUM OF ALL RESPONSES TO PHQ9 QUESTIONS 1 AND 2: 0
1. LITTLE INTEREST OR PLEASURE IN DOING THINGS: NOT AT ALL

## 2019-10-13 NOTE — FLOWSHEET NOTE
10/13/19 1638   Interdisciplinary Plan of Care-Goals (Indications)   Bronchodilator Indications History / Diagnosis   Bronchopulmonary Hygiene Indications Difficulty with Secretion Clearance   Interdisciplinary Plan of Care-Outcomes    Bronchodilator Outcome Patient at Stable Baseline   Bronchopulmonary Hygiene Outcome Optimal Hydration with Moderate or Less Sputum Production   Education   Education Yes - Pt. / Family has been Instructed in use of Respiratory Equipment;Yes - Pt. / Family has been Instructed in use of Respiratory Medications and Adverse Reactions   RT Assessment of Delivered Medications   Evaluation of Medication Delivery Daily Yes-- Pt /Family has been Instructed in use of Respiratory Medications and Adverse Reactions   SVN Group   #SVN Performed Yes   Given By: Mouthpiece   Date SVN Last Changed 10/13/19   Date SVN Next Change Due (Q 7 Days) 10/20/19   PEP/CPT Group   PEP/CPT/Airway Clearance Therapy Yes   #PEP/CPT (Manual) Initial Initial   PEP/CPT Method Flutter Valve   Date Disposable Equipment Last Changed 10/13/19   Date Disposable Equipment Next Change Due (Q 30 Days) 11/13/19   Incentive Spirometry Group   Incentive Spirometry Instruction Yes   Breathing Exercises Yes   Incentive Spirometer Volume 2250 mL   Incentive Spirometer Date Last Changed 10/13/19   Incentive Spirometer Next Change Date (Q 30 Days) 11/13/19   Respiratory WDL   Respiratory (WDL) X   Chest Exam   Work Of Breathing / Effort Mild   Respiration (!) 22   Heart Rate (Monitored) 100   Breath Sounds   Pre/Post Intervention Pre Intervention Assessment   RUL Breath Sounds Fine Crackles   RML Breath Sounds Fine Crackles   RLL Breath Sounds Diminished   BECCA Breath Sounds Crackles   LLL Breath Sounds Crackles;Diminished   Oximetry   #Pulse Oximetry (Single Determination) Yes   Oxygen   Pulse Oximetry 91 %   O2 (LPM) 4   O2 Daily Delivery Respiratory  Silicone Nasal Cannula

## 2019-10-13 NOTE — ASSESSMENT & PLAN NOTE
Continue RT protocol, duo nebs, Pep therapy if warranted, and incentive spirometry.   PFTs in one week

## 2019-10-13 NOTE — ASSESSMENT & PLAN NOTE
Failed outpatient antibiotics with Augmentin which she finished 2 weeks ago.  As above, difficult to discern inflammatory vs infectious?  Resume IV Cefepime for now.   Cultures pending

## 2019-10-13 NOTE — ASSESSMENT & PLAN NOTE
This is Sepsis Present on admission  SIRS criteria identified on my evaluation include: Tachycardia, with heart rate greater than 90 BPM and Leukocyosis, with WBC greater than 12,000  Source is Pulmonary  Sepsis protocol initiated  Fluid resuscitation ordered per protocol  IV Cefepime, sputum, blood cultures pending.   While organ dysfunction may be noted elsewhere in this problem list or in the chart, degree of organ dysfunction does not meet CMS criteria for severe sepsis

## 2019-10-13 NOTE — ED PROVIDER NOTES
ED Provider Note    CHIEF COMPLAINT  Chief Complaint   Patient presents with   • Cough   • Shortness of Breath       HPI  Lillian Addison is a 59 y.o. female who presents to the emergency department planing of cough, and shortness of breath.  The patient is a cough for the last several days but not increasing shortness of breath.  This is been associated with a rapid heartbeat and the difficulty getting her breath today.  The patient has had a fever today.  Cough is been dry nonproductive.  She has a sore throat.  A little bit of runny nose.  No flu exposures.  Patient had a recent high-resolution CT that showed multiple possible different etiologies for her underlying lung disease.  Patient denies any history of pulmonary embolism.  She denies any other aggravating leaving factors or associated complaints.    REVIEW OF SYSTEMS  See HPI for further details. All other systems are negative.    PAST MEDICAL HISTORY  Past Medical History:   Diagnosis Date   • Asthma    • Diabetes (HCC)    • Hyperlipidemia    • Hypertension    • Thyroid disease        FAMILY HISTORY  Family History   Problem Relation Age of Onset   • Cancer Mother 55        breast   • Heart Disease Mother         MI   • Cancer Maternal Grandmother         breast   • Cancer Sister         breast   • Diabetes Father    • Cancer Father         SCC lung       SOCIAL HISTORY  Social History     Socioeconomic History   • Marital status:      Spouse name: Not on file   • Number of children: Not on file   • Years of education: Not on file   • Highest education level: Not on file   Occupational History   • Not on file   Social Needs   • Financial resource strain: Not on file   • Food insecurity:     Worry: Not on file     Inability: Not on file   • Transportation needs:     Medical: Not on file     Non-medical: Not on file   Tobacco Use   • Smoking status: Former Smoker     Packs/day: 1.00     Years: 20.00     Pack years: 20.00     Types: Cigarettes      Last attempt to quit: 1994     Years since quittin.7   • Smokeless tobacco: Never Used   Substance and Sexual Activity   • Alcohol use: Yes     Alcohol/week: 0.6 - 1.2 oz     Types: 1 - 2 Shots of liquor per week     Comment: 1-2x per week, 1-2 drinks   • Drug use: Yes     Types: Marijuana     Comment: edibled   • Sexual activity: Not on file   Lifestyle   • Physical activity:     Days per week: Not on file     Minutes per session: Not on file   • Stress: Not on file   Relationships   • Social connections:     Talks on phone: Not on file     Gets together: Not on file     Attends Faith service: Not on file     Active member of club or organization: Not on file     Attends meetings of clubs or organizations: Not on file     Relationship status: Not on file   • Intimate partner violence:     Fear of current or ex partner: Not on file     Emotionally abused: Not on file     Physically abused: Not on file     Forced sexual activity: Not on file   Other Topics Concern   • Not on file   Social History Narrative   • Not on file       SURGICAL HISTORY  Past Surgical History:   Procedure Laterality Date   • BRONCHOSCOPY-ENDO  2019    Procedure: BRONCHOSCOPY;  Surgeon: Holly Velasquez M.D.;  Location: SURGERY [facility];  Service: Pulmonary   • BRONCHOSCOPY/NDL BX  2019    Procedure: BRONCHOSCOPY, WITH NEEDLE BIOPSY-  TRANSBRONCHIAL BX;  Surgeon: Holly Velasquez M.D.;  Location: SURGERY [facility];  Service: Pulmonary   • GASTRECTOMY  2006    Rou-En-Y   • PB C-SEC ONLY,PBEV C-SEC     • PRIMARY C SECTION         CURRENT MEDICATIONS  Home Medications     Reviewed by Nicole Govea (Pharmacy Tech) on 10/13/19 at 1411  Med List Status: Complete   Medication Last Dose Status   albuterol 108 (90 Base) MCG/ACT Aero Soln inhalation aerosol 10/13/2019 Active   Ascorbic Acid (VITAMIN C PO) 10/12/2019 Active   CALCIUM PO 10/12/2019 Active   cyanocobalamin (VITAMIN B-12) 500 MCG Tab 10/12/2019  "Active   Dexlansoprazole (DEXILANT) 60 MG CAPSULE DELAYED RELEASE delayed-release capsule 10/13/2019 Active   fluticasone (FLOVENT HFA) 110 MCG/ACT Aerosol 10/13/2019 Active   ibuprofen (MOTRIN) 200 MG Tab 10/13/2019 Active   IRON PO 10/12/2019 Active   levothyroxine (SYNTHROID) 88 MCG Tab 10/13/2019 Active   lisinopril (PRINIVIL) 20 MG TABS 10/13/2019 Active   magnesium gluconate (MAG-G) 500 MG tablet 10/12/2019 Active   Omega-3 Fatty Acids (FISH OIL) 1000 MG Cap capsule 10/12/2019 Active   therapeutic multivitamin-minerals (THERAGRAN-M) Tab 10/12/2019 Active   vitamin D (CHOLECALCIFEROL) 1000 UNIT Tab 10/12/2019 Active                ALLERGIES  Allergies   Allergen Reactions   • Codeine    • Sulfa Drugs        PHYSICAL EXAM  VITAL SIGNS: /84   Pulse (!) 110   Temp 37.9 °C (100.2 °F) (Oral)   Resp 20   Ht 1.778 m (5' 10\")   Wt 77.7 kg (171 lb 4.8 oz)   LMP  (LMP Unknown)   SpO2 92%   BMI 24.58 kg/m²    Constitutional: Awake alert anxious moderate distress.  HENT: Normocephalic, Atraumatic, Bilateral external ears normal, Oropharynx moist, No oral exudates, Nose normal.   Eyes: PERRL, EOMI, Conjunctiva normal, No discharge.   Neck: Normal range of motion, No tenderness, Supple, No stridor.     Cardiovascular: Normal heart rate, Normal rhythm, No murmurs, No rubs, No gallops.   Thorax & Lungs: Coarse crackles bilaterally.  No wheezes  Abdomen: Bowel sounds normal, Soft, No tenderness,  Skin: Warm, Dry, No erythema, No rash.   Back: No tenderness, No CVA tenderness.   Musculoskeletal: Good range of motion in all major joints.   Neurologic: Alert, No focal deficits noted.   Psychiatric: Affect normal    Results for orders placed or performed during the hospital encounter of 10/13/19   CBC WITH DIFFERENTIAL   Result Value Ref Range    WBC 19.0 (H) 4.8 - 10.8 K/uL    RBC 5.75 (H) 4.20 - 5.40 M/uL    Hemoglobin 17.1 (H) 12.0 - 16.0 g/dL    Hematocrit 50.7 (H) 37.0 - 47.0 %    MCV 88.2 81.4 - 97.8 fL    MCH " 29.7 27.0 - 33.0 pg    MCHC 33.7 33.6 - 35.0 g/dL    RDW 47.3 35.9 - 50.0 fL    Platelet Count 264 164 - 446 K/uL    MPV 9.2 9.0 - 12.9 fL    Neutrophils-Polys 92.10 (H) 44.00 - 72.00 %    Lymphocytes 2.70 (L) 22.00 - 41.00 %    Monocytes 3.90 0.00 - 13.40 %    Eosinophils 0.30 0.00 - 6.90 %    Basophils 0.60 0.00 - 1.80 %    Immature Granulocytes 0.40 0.00 - 0.90 %    Nucleated RBC 0.00 /100 WBC    Neutrophils (Absolute) 17.49 (H) 2.00 - 7.15 K/uL    Lymphs (Absolute) 0.52 (L) 1.00 - 4.80 K/uL    Monos (Absolute) 0.75 0.00 - 0.85 K/uL    Eos (Absolute) 0.06 0.00 - 0.51 K/uL    Baso (Absolute) 0.12 0.00 - 0.12 K/uL    Immature Granulocytes (abs) 0.08 0.00 - 0.11 K/uL    NRBC (Absolute) 0.00 K/uL   COMP METABOLIC PANEL   Result Value Ref Range    Sodium 140 135 - 145 mmol/L    Potassium 3.9 3.6 - 5.5 mmol/L    Chloride 105 96 - 112 mmol/L    Co2 20 20 - 33 mmol/L    Anion Gap 15.0 (H) 0.0 - 11.9    Glucose 116 (H) 65 - 99 mg/dL    Bun 11 8 - 22 mg/dL    Creatinine 0.59 0.50 - 1.40 mg/dL    Calcium 9.3 8.4 - 10.2 mg/dL    AST(SGOT) 20 12 - 45 U/L    ALT(SGPT) 18 2 - 50 U/L    Alkaline Phosphatase 66 30 - 99 U/L    Total Bilirubin 0.7 0.1 - 1.5 mg/dL    Albumin 3.5 3.2 - 4.9 g/dL    Total Protein 8.4 (H) 6.0 - 8.2 g/dL    Globulin 4.9 (H) 1.9 - 3.5 g/dL    A-G Ratio 0.7 g/dL   URINALYSIS   Result Value Ref Range    Color Yellow     Character Clear     Specific Gravity 1.015 <1.035    Ph 6.5 5.0 - 8.0    Glucose Negative Negative mg/dL    Ketones >=80 (A) Negative mg/dL    Protein Negative Negative mg/dL    Bilirubin Moderate (A) Negative    Nitrite Negative Negative    Leukocyte Esterase Negative Negative    Occult Blood Negative Negative    Micro Urine Req see below    TROPONIN   Result Value Ref Range    Troponin T 7 6 - 19 ng/L   proBrain Natriuretic Peptide, NT   Result Value Ref Range    NT-proBNP 121 0 - 125 pg/mL   D-DIMER   Result Value Ref Range    D-Dimer Screen 0.75 (H) 0.00 - 0.50 ug/mL (FEU)   LACTIC ACID    Result Value Ref Range    Lactic Acid 1.6 0.5 - 2.0 mmol/L   ESTIMATED GFR   Result Value Ref Range    GFR If African American >60 >60 mL/min/1.73 m 2    GFR If Non African American >60 >60 mL/min/1.73 m 2   EKG   Result Value Ref Range    Report       [facility] Emergency Dept.    Test Date:  2019-10-13  Pt Name:    GINNA ROCHE              Department: EDS  MRN:        [id number]                      Room:       -ROOM 2  Gender:     Female                       Technician: [id number]  :        [id number]                   Requested By:RANDALL MOORE  Order #:    [id number]                    Reading MD: RANDALL MOORE. [facility]    Measurements  Intervals                                Axis  Rate:       105                          P:          66  CT:         128                          QRS:        -76  QRSD:       95                           T:          21  QT:         351  QTc:        465    Interpretive Statements  Sinus tachycardia  Probable left atrial enlargement  Left anterior fascicular block  Compared to ECG 2019 03:51:30  Left anterior fascicular block now present  Left-axis deviation no longer present    Electronically Signed On 10- 14:11:56 PDT by RANDALL MOORE. [facility]          RADIOLOGY/PROCEDURES  DX-CHEST-PORTABLE (1 VIEW)   Final Result         No acute cardiac or pulmonary abnormality is identified.            COURSE & MEDICAL DECISION MAKING  Pertinent Labs & Imaging studies reviewed. (See chart for details)    Patient presents emergency department complaining of shortness of breath cough and fever.  She has been having ongoing lung problems for most of the year.  Recent CT was obtained.    Results from recent outpatient high-resolution CT showed diffuse groundglass opacities.    Patient is cultured worked up with a septic protocol.  Because of recent authorization of concern PE.  Did a d-dimer is mildly elevated.  Considering her crackles and abnormal lung  sounds I think infectious etiology is much more likely.    Patient start on broad-spectrum antibiotics per she does have a significant leukocytosis.  Metabolic panel is unremarkable.    Chart review shows a history of asthma.  The patient denies having history of asthma.  She states this was diagnosed just and she has had this lung problem this year.  I do not hear any wheezing at this time.      Patient lactic acid is reassuring.  She will be admitted to hospice for continued work-up and treatment she is admitted in guarded condition.  Case was discussed with Dr. Aguilera          FINAL IMPRESSION  1. Pneumonia due to infectious organism, unspecified laterality, unspecified part of lung     2. Acute respiratory failure with hypoxia (HCC)       *  2.   3.         Electronically signed by: Mateo Capone, 10/13/2019 1:09 PM

## 2019-10-13 NOTE — ED TRIAGE NOTES
"Chief Complaint   Patient presents with   • Cough   • Shortness of Breath     Blood Pressure 137/84   Pulse (Abnormal) 110   Temperature 37.9 °C (100.2 °F) (Oral)   Respiration 20   Height 1.778 m (5' 10\")   Weight 77.7 kg (171 lb 4.8 oz)   Last Menstrual Period  (LMP Unknown)   Oxygen Saturation 92%   Body Mass Index 24.58 kg/m²     Pt reports a cough and SOB since yesterday with a fever starting today. Pts SPO2 has been low and felt her heart \"pounding\".  "

## 2019-10-13 NOTE — ASSESSMENT & PLAN NOTE
Acute on chronic, was on 2L at home, now needing 4L  Recent CT done on 9/27 shows groundglass opacities in the upper lobes possibly consistent with hypersensitivity pneumonitis  New onset of dyspnea diagnosed 1 year ago.  Has had rheumatology markers , which have been neg, only thing not ordered was ANCA , although eosinophil levels are 0  scheduled to get PFTs in 1 week.  Continue with IV Cefepime  Continue with IV solumedrol  Pro-calcitonin 0.21. Repeat in 48 hrs.  Continue RT protocol, duo nebs, Pep therapy if warranted, and incentive spirometry.   Pulmonary consulted

## 2019-10-13 NOTE — ASSESSMENT & PLAN NOTE
No formal diagnosis despite followed outpatient pulmonary.  Awaiting for recs from pulmonary.  As above IV steroids, PPI for possible reflux, RT protocol

## 2019-10-13 NOTE — H&P
Hospital Medicine History & Physical Note    Date of Service  10/13/2019    Primary Care Physician  JENNIFER Orr.    Consultants  Pulmonary- Spoke with Hilary Celestin will see in AM    Code Status  Full    Chief Complaint  SOB    History of Presenting Illness  59 y.o. Female with a complicated pulmonary history who is being worked up as an outpatient for possible interstitial lung disease versus hypersensitivity pneumonitis who presented 10/13/2019 with worsening shortness of breath and cough.    Patient first started having symptoms of dyspnea with exertion about 1 year ago.  She was hospitalized in April for pneumonia and had a bronchoscopy at that time which showed bloody fluid however cultures were negative.  She was treated with steroids and antibiotics and was discharged.  She has been seen and followed by pulmonology since that time.  She has also had an outpatient barium swallow which revealed mild silent aspiration therefore she has been started on a PPI.  She is scheduled to get PFTs next week.  She had a high-resolution CT scan done on 9/27 which reveals bilateral upper lobe groundglass opacities.  She has taken antibiotics recently starting on 9/20 through 9/27 and did not have significant improvement of her symptoms.  At that time, she was started on 2 L of oxygen which she has been using for the last 2-1/2 weeks.  Her symptoms have continued to worsen and she notes ongoing dyspnea with exertion.  She has occasional sputum production of a yellowish-white sputum.  She also notes wheezing.  She has felt hot and cold flashes however has not measured a fever at home.  She denies any weight loss, no chest pain or palpitations.  No focal weakness.  No recent travel.    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    ER course: Chest x-ray was clear however she is hypoxic requiring 4 L.  WBC elevated at 19, heart rate  was 104.  She will be admitted for IV antibiotic therapy.  I will discuss with pulmonology for possible bronchoscopy versus other inpatient work-up due to her refractory symptoms    Review of Systems  Review of Systems   Constitutional: Positive for malaise/fatigue. Negative for chills and fever.   HENT: Negative for sore throat.    Respiratory: Positive for cough, shortness of breath and wheezing.    Cardiovascular: Negative for chest pain and palpitations.   Gastrointestinal: Negative for abdominal pain, blood in stool, diarrhea, heartburn, nausea and vomiting.   Genitourinary: Negative for dysuria and frequency.   Musculoskeletal: Negative for back pain and myalgias.   Neurological: Negative for dizziness, focal weakness, weakness and headaches.   Psychiatric/Behavioral: Negative for depression and hallucinations.   All other systems reviewed and are negative.      Past Medical History   has a past medical history of Asthma, Diabetes (HCC), Hyperlipidemia, Hypertension, and Thyroid disease.    Surgical History   has a past surgical history that includes pr c-sec only,prev c-sec; bronchoscopy-endo (4/23/2019); bronchoscopy/ndl bx (4/23/2019); primary c section; and gastrectomy (2006).     Family History  family history includes Cancer in her father, maternal grandmother, and sister; Cancer (age of onset: 55) in her mother; Diabetes in her father; Heart Disease in her mother.     Social History   reports that she quit smoking about 25 years ago. Her smoking use included cigarettes. She has a 20.00 pack-year smoking history. She has never used smokeless tobacco. She reports that she drinks about 0.6 - 1.2 oz of alcohol per week. She reports that she has current or past drug history. Drug: Marijuana.    Allergies  Allergies   Allergen Reactions   • Codeine      Pt reports that it makes her feel weird    • Sulfa Drugs Rash     .       Medications  Prior to Admission Medications   Prescriptions Last Dose Informant  Patient Reported? Taking?   Ascorbic Acid (VITAMIN C PO) 10/12/2019 at 1700 Patient Yes Yes   Sig: Take 1 Tab by mouth every evening.   CALCIUM PO 10/12/2019 at 1200 Patient Yes Yes   Sig: Take 1 Tab by mouth every day.   Dexlansoprazole (DEXILANT) 60 MG CAPSULE DELAYED RELEASE delayed-release capsule 10/13/2019 at 0900 Patient Yes Yes   Sig: Take 60 mg by mouth every day.   IRON PO 10/12/2019 at 1700 Patient Yes Yes   Sig: Take 1 Tab by mouth every evening.   Omega-3 Fatty Acids (FISH OIL) 1000 MG Cap capsule 10/12/2019 at 1200 Patient Yes No   Sig: Take 1,000 mg by mouth every day.   albuterol 108 (90 Base) MCG/ACT Aero Soln inhalation aerosol 10/13/2019 at 1000 Patient Yes No   Sig: Inhale 2 Puffs by mouth every 6 hours as needed for Shortness of Breath.   cyanocobalamin (VITAMIN B-12) 500 MCG Tab 10/12/2019 at 1200 Patient Yes No   Sig: Take 500 mcg by mouth every day.   fluticasone (FLOVENT HFA) 110 MCG/ACT Aerosol 10/13/2019 at 0900 Patient No No   Sig: Inhale 2 Puffs by mouth every 12 hours.   ibuprofen (MOTRIN) 200 MG Tab 10/13/2019 at 1000 Patient Yes Yes   Sig: Take 400 mg by mouth every 6 hours as needed for Mild Pain.   levothyroxine (SYNTHROID) 88 MCG Tab 10/13/2019 at 0800 Patient Yes Yes   Sig: Take 88 mcg by mouth Every morning on an empty stomach.   lisinopril (PRINIVIL) 20 MG TABS 10/13/2019 at 0900 Patient Yes No   Sig: Take 20 mg by mouth every day.   magnesium gluconate (MAG-G) 500 MG tablet 10/12/2019 at 1200 Patient Yes No   Sig: Take 500 mg by mouth every day.   therapeutic multivitamin-minerals (THERAGRAN-M) Tab 10/12/2019 at 1200 Patient Yes No   Sig: Take 1 Tab by mouth every day.   vitamin D (CHOLECALCIFEROL) 1000 UNIT Tab 10/12/2019 at 1200 Patient Yes No   Sig: Take 1,000 Units by mouth every day.      Facility-Administered Medications: None       Physical Exam  Temp:  [36.7 °C (98 °F)-37.9 °C (100.2 °F)] 36.7 °C (98 °F)  Pulse:  [] 101  Resp:  [16-28] 20  BP: (105-137)/(54-84)  121/54  SpO2:  [91 %-93 %] 91 %    Physical Exam   Constitutional: She appears well-developed and well-nourished. No distress.   HENT:   Head: Normocephalic.   Mouth/Throat: No oropharyngeal exudate.   Eyes: Pupils are equal, round, and reactive to light. No scleral icterus.   Neck: Normal range of motion. No JVD present. No thyromegaly present.   Cardiovascular: Regular rhythm. Tachycardia present.   No murmur heard.  Pulmonary/Chest: She is in respiratory distress. She has wheezes.   Crackles bilateral lower lobes   Abdominal: Soft. Bowel sounds are normal. She exhibits no distension. There is no tenderness.   Musculoskeletal: Normal range of motion. She exhibits no edema.   Neurological: She is alert. No cranial nerve deficit. Coordination normal.   Skin: Skin is warm and dry. No rash noted. No erythema.   Psychiatric: She has a normal mood and affect. Her behavior is normal.       Laboratory:  Recent Labs     10/13/19  1334   WBC 19.0*   RBC 5.75*   HEMOGLOBIN 17.1*   HEMATOCRIT 50.7*   MCV 88.2   MCH 29.7   MCHC 33.7   RDW 47.3   PLATELETCT 264   MPV 9.2     Recent Labs     10/13/19  1334   SODIUM 140   POTASSIUM 3.9   CHLORIDE 105   CO2 20   GLUCOSE 116*   BUN 11   CREATININE 0.59   CALCIUM 9.3     Recent Labs     10/13/19  1334   ALTSGPT 18   ASTSGOT 20   ALKPHOSPHAT 66   TBILIRUBIN 0.7   GLUCOSE 116*     Recent Labs     10/13/19  1334   INR 0.93     Recent Labs     10/13/19  1334   NTPROBNP 121         Recent Labs     10/13/19  1334   TROPONINT 7       Urinalysis:    Recent Labs     10/13/19  1315   SPECGRAVITY 1.015   GLUCOSEUR Negative   KETONES >=80*   NITRITE Negative   LEUKESTERAS Negative        Imaging:  DX-CHEST-PORTABLE (1 VIEW)   Final Result         No acute cardiac or pulmonary abnormality is identified.            Assessment/Plan:  I anticipate this patient will require at least two midnights for appropriate medical management, necessitating inpatient admission.    * Acute on chronic respiratory  failure (HCC)- (present on admission)  Assessment & Plan  She has been started on 2 L of oxygen about 3 weeks ago, presents here with increasing shortness of breath and found to have hypoxia now requiring 4 L.  Recent CT done on 9/27 shows groundglass opacities in the upper lobes possibly consistent with hypersensitivity pneumonitis  She has been having shortness of breath, cough and fatigue for almost 1 year  She is scheduled to get PFTs next week  She has been followed with Dr. Quach outpatient pulmonology  Initiate Solu-Medrol 40 every 8  I will consult pulmonology as I feel she likely needs a bronchoscopy during his hospital stay  Antibiotics  Dayna's  RT protocol    CAP (community acquired pneumonia)  Assessment & Plan  She failed outpatient antibiotics with Augmentin which she finished 2 weeks ago but her symptoms did not improve.  Changed to cefepime which would cover Pseudomonas  I will consult pulmonology, possible bronchoscopy  Sputum culture  RT protocol    Moderate persistent asthma- (present on admission)  Assessment & Plan  She is scheduled for outpatient PFTs next week  Nebulizers  Steroids and antibiotics as discussed above    Sepsis (HCC)  Assessment & Plan  This is Sepsis Present on admission  SIRS criteria identified on my evaluation include: Tachycardia, with heart rate greater than 90 BPM and Leukocyosis, with WBC greater than 12,000  Source is Pulmonary  Sepsis protocol initiated  Fluid resuscitation ordered per protocol  IV antibiotics as appropriate for source of sepsis  While organ dysfunction may be noted elsewhere in this problem list or in the chart, degree of organ dysfunction does not meet CMS criteria for severe sepsis          Silent aspiration- (present on admission)  Assessment & Plan  Continue omeprazole    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  She needs PFTs and a bronchoscopy  I will consult pulmonology for bronchoscopy during his hospital stay  She has had a barium swallow which showed mild reflux therefore is on a PPI, I will continue this  She used to work in a manufacturing warehouse therefore I wonder about inhalational exposures  She has had multiple autoimmune labs drawn recently and all of which are negative  IV Solu-Medrol  Possible bronch    Mixed hyperlipidemia- (present on admission)  Assessment & Plan  She is not on a statin  Check lipid panel    Essential hypertension- (present on admission)  Assessment & Plan  Controlled, continue lisinopril      VTE prophylaxis: Lovenox

## 2019-10-14 LAB
ALBUMIN SERPL BCP-MCNC: 3.1 G/DL (ref 3.2–4.9)
BASOPHILS # BLD AUTO: 0.2 % (ref 0–1.8)
BASOPHILS # BLD: 0.03 K/UL (ref 0–0.12)
BUN SERPL-MCNC: 9 MG/DL (ref 8–22)
CALCIUM SERPL-MCNC: 8.9 MG/DL (ref 8.4–10.2)
CHLORIDE SERPL-SCNC: 105 MMOL/L (ref 96–112)
CHOLEST SERPL-MCNC: 157 MG/DL (ref 100–199)
CO2 SERPL-SCNC: 22 MMOL/L (ref 20–33)
CREAT SERPL-MCNC: 0.6 MG/DL (ref 0.5–1.4)
EOSINOPHIL # BLD AUTO: 0 K/UL (ref 0–0.51)
EOSINOPHIL NFR BLD: 0 % (ref 0–6.9)
ERYTHROCYTE [DISTWIDTH] IN BLOOD BY AUTOMATED COUNT: 49 FL (ref 35.9–50)
GLUCOSE SERPL-MCNC: 187 MG/DL (ref 65–99)
HCT VFR BLD AUTO: 44.8 % (ref 37–47)
HDLC SERPL-MCNC: 80 MG/DL
HGB BLD-MCNC: 14.5 G/DL (ref 12–16)
IMM GRANULOCYTES # BLD AUTO: 0.04 K/UL (ref 0–0.11)
IMM GRANULOCYTES NFR BLD AUTO: 0.3 % (ref 0–0.9)
LACTATE BLD-SCNC: 2.6 MMOL/L (ref 0.5–2)
LACTATE BLD-SCNC: 2.6 MMOL/L (ref 0.5–2)
LACTATE BLD-SCNC: 2.8 MMOL/L (ref 0.5–2)
LDLC SERPL CALC-MCNC: 65 MG/DL
LYMPHOCYTES # BLD AUTO: 0.32 K/UL (ref 1–4.8)
LYMPHOCYTES NFR BLD: 2.7 % (ref 22–41)
MCH RBC QN AUTO: 29.3 PG (ref 27–33)
MCHC RBC AUTO-ENTMCNC: 32.4 G/DL (ref 33.6–35)
MCV RBC AUTO: 90.5 FL (ref 81.4–97.8)
MONOCYTES # BLD AUTO: 0.2 K/UL (ref 0–0.85)
MONOCYTES NFR BLD AUTO: 1.7 % (ref 0–13.4)
NEUTROPHILS # BLD AUTO: 11.46 K/UL (ref 2–7.15)
NEUTROPHILS NFR BLD: 95.1 % (ref 44–72)
NRBC # BLD AUTO: 0 K/UL
NRBC BLD-RTO: 0 /100 WBC
PHOSPHATE SERPL-MCNC: 2.8 MG/DL (ref 2.5–4.5)
PLATELET # BLD AUTO: 224 K/UL (ref 164–446)
PMV BLD AUTO: 9.2 FL (ref 9–12.9)
POTASSIUM SERPL-SCNC: 4.1 MMOL/L (ref 3.6–5.5)
RBC # BLD AUTO: 4.95 M/UL (ref 4.2–5.4)
SODIUM SERPL-SCNC: 140 MMOL/L (ref 135–145)
TRIGL SERPL-MCNC: 60 MG/DL (ref 0–149)
WBC # BLD AUTO: 12.1 K/UL (ref 4.8–10.8)

## 2019-10-14 PROCEDURE — 99233 SBSQ HOSP IP/OBS HIGH 50: CPT | Performed by: HOSPITALIST

## 2019-10-14 PROCEDURE — 700105 HCHG RX REV CODE 258: Performed by: HOSPITALIST

## 2019-10-14 PROCEDURE — 83605 ASSAY OF LACTIC ACID: CPT | Mod: 91

## 2019-10-14 PROCEDURE — 700111 HCHG RX REV CODE 636 W/ 250 OVERRIDE (IP): Performed by: HOSPITALIST

## 2019-10-14 PROCEDURE — 94668 MNPJ CHEST WALL SBSQ: CPT

## 2019-10-14 PROCEDURE — 700111 HCHG RX REV CODE 636 W/ 250 OVERRIDE (IP): Performed by: INTERNAL MEDICINE

## 2019-10-14 PROCEDURE — 94760 N-INVAS EAR/PLS OXIMETRY 1: CPT

## 2019-10-14 PROCEDURE — 80061 LIPID PANEL: CPT

## 2019-10-14 PROCEDURE — 80069 RENAL FUNCTION PANEL: CPT

## 2019-10-14 PROCEDURE — 700101 HCHG RX REV CODE 250: Performed by: HOSPITALIST

## 2019-10-14 PROCEDURE — 700102 HCHG RX REV CODE 250 W/ 637 OVERRIDE(OP): Performed by: HOSPITALIST

## 2019-10-14 PROCEDURE — 85025 COMPLETE CBC W/AUTO DIFF WBC: CPT

## 2019-10-14 PROCEDURE — 700101 HCHG RX REV CODE 250: Performed by: INTERNAL MEDICINE

## 2019-10-14 PROCEDURE — 94640 AIRWAY INHALATION TREATMENT: CPT

## 2019-10-14 PROCEDURE — A9270 NON-COVERED ITEM OR SERVICE: HCPCS | Performed by: HOSPITALIST

## 2019-10-14 PROCEDURE — A9270 NON-COVERED ITEM OR SERVICE: HCPCS | Performed by: INTERNAL MEDICINE

## 2019-10-14 PROCEDURE — 99222 1ST HOSP IP/OBS MODERATE 55: CPT | Performed by: INTERNAL MEDICINE

## 2019-10-14 PROCEDURE — 700102 HCHG RX REV CODE 250 W/ 637 OVERRIDE(OP): Performed by: INTERNAL MEDICINE

## 2019-10-14 PROCEDURE — 700105 HCHG RX REV CODE 258: Performed by: INTERNAL MEDICINE

## 2019-10-14 PROCEDURE — 770020 HCHG ROOM/CARE - TELE (206)

## 2019-10-14 RX ORDER — ZOLPIDEM TARTRATE 5 MG/1
10 TABLET ORAL NIGHTLY PRN
Status: DISCONTINUED | OUTPATIENT
Start: 2019-10-14 | End: 2019-10-15 | Stop reason: HOSPADM

## 2019-10-14 RX ORDER — IPRATROPIUM BROMIDE AND ALBUTEROL SULFATE 2.5; .5 MG/3ML; MG/3ML
3 SOLUTION RESPIRATORY (INHALATION)
Status: DISCONTINUED | OUTPATIENT
Start: 2019-10-14 | End: 2019-10-15 | Stop reason: HOSPADM

## 2019-10-14 RX ORDER — SODIUM CHLORIDE 9 MG/ML
INJECTION, SOLUTION INTRAVENOUS ONCE
Status: COMPLETED | OUTPATIENT
Start: 2019-10-14 | End: 2019-10-14

## 2019-10-14 RX ORDER — OMEPRAZOLE 20 MG/1
40 CAPSULE, DELAYED RELEASE ORAL DAILY
Status: DISCONTINUED | OUTPATIENT
Start: 2019-10-15 | End: 2019-10-15 | Stop reason: HOSPADM

## 2019-10-14 RX ORDER — OMEPRAZOLE 20 MG/1
20 CAPSULE, DELAYED RELEASE ORAL EVERY 12 HOURS
Status: DISCONTINUED | OUTPATIENT
Start: 2019-10-15 | End: 2019-10-14

## 2019-10-14 RX ORDER — PREDNISONE 20 MG/1
40 TABLET ORAL DAILY
Status: DISCONTINUED | OUTPATIENT
Start: 2019-10-14 | End: 2019-10-15

## 2019-10-14 RX ADMIN — FLUTICASONE PROPIONATE 220 MCG: 110 AEROSOL, METERED RESPIRATORY (INHALATION) at 10:25

## 2019-10-14 RX ADMIN — OMEPRAZOLE 20 MG: 20 CAPSULE, DELAYED RELEASE ORAL at 05:42

## 2019-10-14 RX ADMIN — IPRATROPIUM BROMIDE AND ALBUTEROL SULFATE 3 ML: .5; 3 SOLUTION RESPIRATORY (INHALATION) at 14:42

## 2019-10-14 RX ADMIN — IPRATROPIUM BROMIDE AND ALBUTEROL SULFATE 3 ML: .5; 3 SOLUTION RESPIRATORY (INHALATION) at 20:04

## 2019-10-14 RX ADMIN — CEFTRIAXONE SODIUM 2 G: 2 INJECTION, POWDER, FOR SOLUTION INTRAMUSCULAR; INTRAVENOUS at 17:00

## 2019-10-14 RX ADMIN — LEVOTHYROXINE SODIUM 88 MCG: 88 TABLET ORAL at 05:42

## 2019-10-14 RX ADMIN — ENOXAPARIN SODIUM 40 MG: 100 INJECTION SUBCUTANEOUS at 05:36

## 2019-10-14 RX ADMIN — ZOLPIDEM TARTRATE 10 MG: 5 TABLET ORAL at 20:58

## 2019-10-14 RX ADMIN — SODIUM CHLORIDE: 9 INJECTION, SOLUTION INTRAVENOUS at 04:00

## 2019-10-14 RX ADMIN — IPRATROPIUM BROMIDE AND ALBUTEROL SULFATE 3 ML: .5; 3 SOLUTION RESPIRATORY (INHALATION) at 10:25

## 2019-10-14 RX ADMIN — FLUTICASONE PROPIONATE 220 MCG: 110 AEROSOL, METERED RESPIRATORY (INHALATION) at 20:08

## 2019-10-14 RX ADMIN — IPRATROPIUM BROMIDE AND ALBUTEROL SULFATE 3 ML: .5; 3 SOLUTION RESPIRATORY (INHALATION) at 02:04

## 2019-10-14 RX ADMIN — PREDNISONE 40 MG: 20 TABLET ORAL at 21:43

## 2019-10-14 RX ADMIN — IPRATROPIUM BROMIDE AND ALBUTEROL SULFATE 3 ML: .5; 3 SOLUTION RESPIRATORY (INHALATION) at 07:12

## 2019-10-14 RX ADMIN — SODIUM CHLORIDE: 9 INJECTION, SOLUTION INTRAVENOUS at 15:28

## 2019-10-14 RX ADMIN — LISINOPRIL 20 MG: 20 TABLET ORAL at 05:37

## 2019-10-14 RX ADMIN — CEFEPIME 2 G: 2 INJECTION, POWDER, FOR SOLUTION INTRAVENOUS at 05:30

## 2019-10-14 ASSESSMENT — ENCOUNTER SYMPTOMS
PALPITATIONS: 0
MEMORY LOSS: 0
HEMOPTYSIS: 0
BLOOD IN STOOL: 0
SPEECH CHANGE: 0
CHILLS: 0
DEPRESSION: 0
SORE THROAT: 0
COUGH: 0
TINGLING: 0
EYE PAIN: 0
VOMITING: 0
ORTHOPNEA: 0
DIZZINESS: 0
PHOTOPHOBIA: 0
CLAUDICATION: 0
SENSORY CHANGE: 0
NECK PAIN: 0
STRIDOR: 0
NERVOUS/ANXIOUS: 1
CONSTIPATION: 0
BLURRED VISION: 0
TREMORS: 0
WEAKNESS: 0
NAUSEA: 0
FEVER: 0
SPUTUM PRODUCTION: 0
HEARTBURN: 0
HEADACHES: 0
BACK PAIN: 0
MYALGIAS: 0
DOUBLE VISION: 0
SHORTNESS OF BREATH: 1
PND: 0

## 2019-10-14 NOTE — PROGRESS NOTES
Telemetry Shift Summary     Rhythm SR  HR Range 66-99  Ectopy NA  Measurements 0.16/0.08/0.38           Normal Values  Rhythm SR  HR Range    Measurements 0.12-0.20 / 0.06-0.10  / 0.30-0.52

## 2019-10-14 NOTE — CARE PLAN
Pt instructed to use call light, bed in low locked position, call light within reach, provided frequent checks.    Answered Pt questions to the best of my ability, discussed POC.

## 2019-10-14 NOTE — PROGRESS NOTES
Pt arrived to unit via gurney. Ambulated from gurney to bed, standby assist. Tele monitor applied, vitals taken. Pt assessed. A&O x4. Admit profile and med rec complete. Discussed POC with pt, including IV fluids, IV antibiotics, RT treatments. Welcome folder provided and discussed. Communication board filled out. Questions and concerns addressed, verbalized understanding. Fall precautions in place. Pt demonstrates ability to use call light appropriately. Pt left in lowest position. Bed locked and low.

## 2019-10-14 NOTE — FLOWSHEET NOTE
10/14/19 0713   Interdisciplinary Plan of Care-Goals (Indications)   Bronchodilator Indications History / Diagnosis   Bronchopulmonary Hygiene Indications Difficulty with Secretion Clearance   Interdisciplinary Plan of Care-Outcomes    Bronchodilator Outcome Patient at Stable Baseline   Bronchopulmonary Hygiene Outcome Optimal Hydration with Moderate or Less Sputum Production   Education   Education Yes - Pt. / Family has been Instructed in use of Respiratory Equipment;Yes - Pt. / Family has been Instructed in use of Respiratory Medications and Adverse Reactions   RT Assessment of Delivered Medications   Evaluation of Medication Delivery Daily Yes-- Pt /Family has been Instructed in use of Respiratory Medications and Adverse Reactions   SVN Group   #SVN Performed Yes   Given By: Mouthpiece   PEP/CPT Group   PEP/CPT/Airway Clearance Therapy Yes   #PEP/CPT (Manual) Subsequent Subsequent   PEP/CPT Method Flutter Valve   Incentive Spirometry Group   Incentive Spirometry Instruction Yes   Breathing Exercises Yes   Incentive Spirometer Volume 2000 mL   Respiratory WDL   Respiratory (WDL) X   Chest Exam   Respiration 20   Heart Rate (Monitored) 86   Breath Sounds   Pre/Post Intervention Pre Intervention Assessment   RUL Breath Sounds Crackles   RML Breath Sounds Crackles   RLL Breath Sounds Crackles;Diminished   BECCA Breath Sounds Crackles   LLL Breath Sounds Crackles;Diminished   Oximetry   #Pulse Oximetry (Single Determination) Yes   Oxygen   Pulse Oximetry 94 %   O2 (LPM) 4   O2 Daily Delivery Respiratory  Silicone Nasal Cannula

## 2019-10-14 NOTE — PROGRESS NOTES
Report given to Karel CANAS. Plan of care discussed. Patient resting comfortably in bed. Safety precautions in place.

## 2019-10-14 NOTE — FLOWSHEET NOTE
10/14/19 1026   Interdisciplinary Plan of Care-Goals (Indications)   Bronchodilator Indications History / Diagnosis   Bronchopulmonary Hygiene Indications Difficulty with Secretion Clearance   Interdisciplinary Plan of Care-Outcomes    Bronchodilator Outcome Patient at Stable Baseline   Bronchopulmonary Hygiene Outcome Optimal Hydration with Moderate or Less Sputum Production   RT Assessment of Delivered Medications   Evaluation of Medication Delivery Daily Yes-- Pt /Family has been Instructed in use of Respiratory Medications and Adverse Reactions   SVN Group   #SVN Performed Yes   Given By: Mouthpiece   MDI/DPI Group   #MDI/DPI Given MDI/DPI x 1   PEP/CPT Group   PEP/CPT/Airway Clearance Therapy Yes   #PEP/CPT (Manual) Subsequent Subsequent   PEP/CPT Method Flutter Valve   Respiratory WDL   Respiratory (WDL) X   Chest Exam   Respiration 20   Heart Rate (Monitored) 98   Breath Sounds   Pre/Post Intervention Pre Intervention Assessment   RUL Breath Sounds Fine Crackles   RML Breath Sounds Fine Crackles   RLL Breath Sounds Fine Crackles;Diminished   BECCA Breath Sounds Fine Crackles   LLL Breath Sounds Fine Crackles;Diminished   Oxygen   O2 (LPM) 4   O2 Daily Delivery Respiratory  Silicone Nasal Cannula

## 2019-10-14 NOTE — FLOWSHEET NOTE
10/14/19 1443   Interdisciplinary Plan of Care-Goals (Indications)   Bronchodilator Indications History / Diagnosis   Bronchopulmonary Hygiene Indications Difficulty with Secretion Clearance   Interdisciplinary Plan of Care-Outcomes    Bronchodilator Outcome Improvement in Airflow (peak flow, PFT)   Bronchopulmonary Hygiene Outcome Optimal Hydration with Moderate or Less Sputum Production   RT Assessment of Delivered Medications   Evaluation of Medication Delivery Daily Yes-- Pt /Family has been Instructed in use of Respiratory Medications and Adverse Reactions   SVN Group   #SVN Performed Yes   Given By: Mouthpiece   PEP/CPT Group   PEP/CPT/Airway Clearance Therapy Yes   #PEP/CPT (Manual) Subsequent Subsequent   PEP/CPT Method Flutter Valve   Incentive Spirometry Group   Breathing Exercises Yes   Incentive Spirometer Volume 2000 mL   Respiratory WDL   Respiratory (WDL) X   Chest Exam   Respiration 20   Heart Rate (Monitored) 78   Breath Sounds   Pre/Post Intervention Post Intervention Assessment   RUL Breath Sounds Fine Crackles   RML Breath Sounds Fine Crackles;Diminished   RLL Breath Sounds Fine Crackles;Diminished   BECCA Breath Sounds Fine Crackles   LLL Breath Sounds Fine Crackles;Diminished   Oxygen   Pulse Oximetry 96 %   O2 (LPM) 4  (titrated to 3 lpm.)   O2 Daily Delivery Respiratory  Silicone Nasal Cannula

## 2019-10-14 NOTE — CARE PLAN
Problem: Knowledge Deficit  Goal: Knowledge of disease process/condition, treatment plan, diagnostic tests, and medications will improve  Outcome: PROGRESSING AS EXPECTED   Discussed plan of care with patient including IV fluids and IV antibiotics. Allowed time for questions, patient agreed and verbalized understanding.     Problem: Respiratory:  Goal: Respiratory status will improve  Outcome: PROGRESSING AS EXPECTED   Patient declines SOB at rest and ambulation.

## 2019-10-14 NOTE — PROGRESS NOTES
Report received from MISSAEL Sheppard. Pt denies needs at this time. Safety precautions in place. Call light within reach.

## 2019-10-15 ENCOUNTER — ANESTHESIA EVENT (OUTPATIENT)
Dept: SURGERY | Facility: MEDICAL CENTER | Age: 59
DRG: 871 | End: 2019-10-15
Payer: COMMERCIAL

## 2019-10-15 ENCOUNTER — APPOINTMENT (OUTPATIENT)
Dept: RADIOLOGY | Facility: MEDICAL CENTER | Age: 59
DRG: 871 | End: 2019-10-15
Attending: HOSPITALIST
Payer: COMMERCIAL

## 2019-10-15 ENCOUNTER — PATIENT OUTREACH (OUTPATIENT)
Dept: HEALTH INFORMATION MANAGEMENT | Facility: OTHER | Age: 59
End: 2019-10-15

## 2019-10-15 ENCOUNTER — ANESTHESIA (OUTPATIENT)
Dept: SURGERY | Facility: MEDICAL CENTER | Age: 59
DRG: 871 | End: 2019-10-15
Payer: COMMERCIAL

## 2019-10-15 VITALS
WEIGHT: 167.11 LBS | BODY MASS INDEX: 26.23 KG/M2 | OXYGEN SATURATION: 94 % | RESPIRATION RATE: 20 BRPM | TEMPERATURE: 98 F | SYSTOLIC BLOOD PRESSURE: 122 MMHG | DIASTOLIC BLOOD PRESSURE: 66 MMHG | HEART RATE: 65 BPM | HEIGHT: 67 IN

## 2019-10-15 LAB
ALBUMIN SERPL BCP-MCNC: 3.2 G/DL (ref 3.2–4.9)
ALBUMIN/GLOB SERPL: 0.8 G/DL
ALP SERPL-CCNC: 51 U/L (ref 30–99)
ALT SERPL-CCNC: 12 U/L (ref 2–50)
ANION GAP SERPL CALC-SCNC: 15 MMOL/L (ref 0–11.9)
APPEARANCE FLD: NORMAL
APPEARANCE FLD: NORMAL
AST SERPL-CCNC: 12 U/L (ref 12–45)
BACTERIA UR CULT: NORMAL
BASOPHILS # BLD AUTO: 0.4 % (ref 0–1.8)
BASOPHILS # BLD: 0.03 K/UL (ref 0–0.12)
BILIRUB SERPL-MCNC: 0.4 MG/DL (ref 0.1–1.5)
BODY FLD TYPE: NORMAL
BODY FLD TYPE: NORMAL
BUN SERPL-MCNC: 8 MG/DL (ref 8–22)
CALCIUM SERPL-MCNC: 9 MG/DL (ref 8.4–10.2)
CHLORIDE SERPL-SCNC: 102 MMOL/L (ref 96–112)
CO2 SERPL-SCNC: 22 MMOL/L (ref 20–33)
COLOR FLD: COLORLESS
COLOR FLD: NORMAL
CREAT SERPL-MCNC: 0.55 MG/DL (ref 0.5–1.4)
CSF COMMENTS 1658: NORMAL
CSF COMMENTS 1658: NORMAL
CYTOLOGY REG CYTOL: NORMAL
CYTOLOGY REG CYTOL: NORMAL
EOSINOPHIL # BLD AUTO: 0.06 K/UL (ref 0–0.51)
EOSINOPHIL NFR BLD: 0.7 % (ref 0–6.9)
EOSINOPHIL NFR FLD: 1 %
ERYTHROCYTE [DISTWIDTH] IN BLOOD BY AUTOMATED COUNT: 50.4 FL (ref 35.9–50)
GLOBULIN SER CALC-MCNC: 4.1 G/DL (ref 1.9–3.5)
GLUCOSE BLD-MCNC: 118 MG/DL (ref 65–99)
GLUCOSE SERPL-MCNC: 151 MG/DL (ref 65–99)
GRAM STN SPEC: NORMAL
GRAM STN SPEC: NORMAL
HCT VFR BLD AUTO: 44.9 % (ref 37–47)
HGB BLD-MCNC: 14.6 G/DL (ref 12–16)
HISTIOCYTES NFR FLD: 10 %
HISTIOCYTES NFR FLD: 10 %
IGE SERPL-ACNC: <2 KU/L
IMM GRANULOCYTES # BLD AUTO: 0.04 K/UL (ref 0–0.11)
IMM GRANULOCYTES NFR BLD AUTO: 0.5 % (ref 0–0.9)
INR PPP: 0.91 (ref 0.87–1.13)
LACTATE BLD-SCNC: 1.8 MMOL/L (ref 0.5–2)
LYMPHOCYTES # BLD AUTO: 0.5 K/UL (ref 1–4.8)
LYMPHOCYTES NFR BLD: 6.1 % (ref 22–41)
LYMPHOCYTES NFR FLD: 38 %
LYMPHOCYTES NFR FLD: 57 %
MCH RBC QN AUTO: 29.6 PG (ref 27–33)
MCHC RBC AUTO-ENTMCNC: 32.5 G/DL (ref 33.6–35)
MCV RBC AUTO: 91.1 FL (ref 81.4–97.8)
MESOTHL CELL NFR FLD: 4 %
MESOTHL CELL NFR FLD: 5 %
MONOCYTES # BLD AUTO: 0.33 K/UL (ref 0–0.85)
MONOCYTES NFR BLD AUTO: 4 % (ref 0–13.4)
MONONUC CELLS NFR FLD: 13 %
MONONUC CELLS NFR FLD: 8 %
NEUTROPHILS # BLD AUTO: 7.2 K/UL (ref 2–7.15)
NEUTROPHILS NFR BLD: 88.3 % (ref 44–72)
NEUTROPHILS NFR FLD: 16 %
NEUTROPHILS NFR FLD: 38 %
NRBC # BLD AUTO: 0 K/UL
NRBC BLD-RTO: 0 /100 WBC
PLATELET # BLD AUTO: 239 K/UL (ref 164–446)
PMV BLD AUTO: 9.7 FL (ref 9–12.9)
POTASSIUM SERPL-SCNC: 4.6 MMOL/L (ref 3.6–5.5)
PROCALCITONIN SERPL-MCNC: 0.11 NG/ML
PROT SERPL-MCNC: 7.3 G/DL (ref 6–8.2)
PROTHROMBIN TIME: 12.3 SEC (ref 12–14.6)
RBC # BLD AUTO: 4.93 M/UL (ref 4.2–5.4)
RBC # FLD: NORMAL CELLS/UL
RBC # FLD: NORMAL CELLS/UL
SIGNIFICANT IND 70042: NORMAL
SITE SITE: NORMAL
SODIUM SERPL-SCNC: 139 MMOL/L (ref 135–145)
SOURCE SOURCE: NORMAL
WBC # BLD AUTO: 8.2 K/UL (ref 4.8–10.8)
WBC # FLD: NORMAL CELLS/UL
WBC # FLD: NORMAL CELLS/UL

## 2019-10-15 PROCEDURE — 85610 PROTHROMBIN TIME: CPT

## 2019-10-15 PROCEDURE — 85025 COMPLETE CBC W/AUTO DIFF WBC: CPT

## 2019-10-15 PROCEDURE — 700105 HCHG RX REV CODE 258: Performed by: INTERNAL MEDICINE

## 2019-10-15 PROCEDURE — 94640 AIRWAY INHALATION TREATMENT: CPT

## 2019-10-15 PROCEDURE — 160002 HCHG RECOVERY MINUTES (STAT): Performed by: INTERNAL MEDICINE

## 2019-10-15 PROCEDURE — 87205 SMEAR GRAM STAIN: CPT | Mod: 91

## 2019-10-15 PROCEDURE — 0B9J8ZX DRAINAGE OF LEFT LOWER LUNG LOBE, VIA NATURAL OR ARTIFICIAL OPENING ENDOSCOPIC, DIAGNOSTIC: ICD-10-PCS | Performed by: INTERNAL MEDICINE

## 2019-10-15 PROCEDURE — 700102 HCHG RX REV CODE 250 W/ 637 OVERRIDE(OP): Performed by: HOSPITALIST

## 2019-10-15 PROCEDURE — 160009 HCHG ANES TIME/MIN: Performed by: INTERNAL MEDICINE

## 2019-10-15 PROCEDURE — 94760 N-INVAS EAR/PLS OXIMETRY 1: CPT

## 2019-10-15 PROCEDURE — 160029 HCHG SURGERY MINUTES - 1ST 30 MINS LEVEL 4: Performed by: INTERNAL MEDICINE

## 2019-10-15 PROCEDURE — 700101 HCHG RX REV CODE 250: Performed by: HOSPITALIST

## 2019-10-15 PROCEDURE — 99239 HOSP IP/OBS DSCHRG MGMT >30: CPT | Performed by: HOSPITALIST

## 2019-10-15 PROCEDURE — 99232 SBSQ HOSP IP/OBS MODERATE 35: CPT | Mod: 25 | Performed by: INTERNAL MEDICINE

## 2019-10-15 PROCEDURE — 71275 CT ANGIOGRAPHY CHEST: CPT

## 2019-10-15 PROCEDURE — 700111 HCHG RX REV CODE 636 W/ 250 OVERRIDE (IP): Performed by: ANESTHESIOLOGY

## 2019-10-15 PROCEDURE — A9270 NON-COVERED ITEM OR SERVICE: HCPCS | Performed by: INTERNAL MEDICINE

## 2019-10-15 PROCEDURE — 160048 HCHG OR STATISTICAL LEVEL 1-5: Performed by: INTERNAL MEDICINE

## 2019-10-15 PROCEDURE — 80053 COMPREHEN METABOLIC PANEL: CPT

## 2019-10-15 PROCEDURE — A9270 NON-COVERED ITEM OR SERVICE: HCPCS | Performed by: HOSPITALIST

## 2019-10-15 PROCEDURE — 82962 GLUCOSE BLOOD TEST: CPT

## 2019-10-15 PROCEDURE — 700101 HCHG RX REV CODE 250: Performed by: ANESTHESIOLOGY

## 2019-10-15 PROCEDURE — 87015 SPECIMEN INFECT AGNT CONCNTJ: CPT | Mod: 91

## 2019-10-15 PROCEDURE — 88305 TISSUE EXAM BY PATHOLOGIST: CPT

## 2019-10-15 PROCEDURE — 84145 PROCALCITONIN (PCT): CPT

## 2019-10-15 PROCEDURE — 700111 HCHG RX REV CODE 636 W/ 250 OVERRIDE (IP): Performed by: HOSPITALIST

## 2019-10-15 PROCEDURE — 0B9D8ZX DRAINAGE OF RIGHT MIDDLE LUNG LOBE, VIA NATURAL OR ARTIFICIAL OPENING ENDOSCOPIC, DIAGNOSTIC: ICD-10-PCS | Performed by: INTERNAL MEDICINE

## 2019-10-15 PROCEDURE — 700105 HCHG RX REV CODE 258: Performed by: HOSPITALIST

## 2019-10-15 PROCEDURE — 88112 CYTOPATH CELL ENHANCE TECH: CPT

## 2019-10-15 PROCEDURE — 87206 SMEAR FLUORESCENT/ACID STAI: CPT

## 2019-10-15 PROCEDURE — 31624 DX BRONCHOSCOPE/LAVAGE: CPT | Performed by: INTERNAL MEDICINE

## 2019-10-15 PROCEDURE — 302978 HCHG BRONCHOSCOPY-DIAGNOSTIC

## 2019-10-15 PROCEDURE — 87116 MYCOBACTERIA CULTURE: CPT

## 2019-10-15 PROCEDURE — 700117 HCHG RX CONTRAST REV CODE 255: Performed by: HOSPITALIST

## 2019-10-15 PROCEDURE — 83605 ASSAY OF LACTIC ACID: CPT

## 2019-10-15 PROCEDURE — 89051 BODY FLUID CELL COUNT: CPT | Mod: 91

## 2019-10-15 PROCEDURE — 87102 FUNGUS ISOLATION CULTURE: CPT

## 2019-10-15 PROCEDURE — 700102 HCHG RX REV CODE 250 W/ 637 OVERRIDE(OP): Performed by: INTERNAL MEDICINE

## 2019-10-15 PROCEDURE — 160035 HCHG PACU - 1ST 60 MINS PHASE I: Performed by: INTERNAL MEDICINE

## 2019-10-15 PROCEDURE — 87070 CULTURE OTHR SPECIMN AEROBIC: CPT

## 2019-10-15 PROCEDURE — 700111 HCHG RX REV CODE 636 W/ 250 OVERRIDE (IP): Performed by: INTERNAL MEDICINE

## 2019-10-15 RX ORDER — DOXYCYCLINE 100 MG/1
100 TABLET ORAL EVERY 12 HOURS
Qty: 14 TAB | Refills: 0 | Status: SHIPPED | OUTPATIENT
Start: 2019-10-15 | End: 2019-10-22

## 2019-10-15 RX ORDER — ONDANSETRON 2 MG/ML
4 INJECTION INTRAMUSCULAR; INTRAVENOUS
Status: DISCONTINUED | OUTPATIENT
Start: 2019-10-15 | End: 2019-10-15 | Stop reason: HOSPADM

## 2019-10-15 RX ORDER — ONDANSETRON 2 MG/ML
INJECTION INTRAMUSCULAR; INTRAVENOUS PRN
Status: DISCONTINUED | OUTPATIENT
Start: 2019-10-15 | End: 2019-10-15 | Stop reason: SURG

## 2019-10-15 RX ORDER — PREDNISONE 20 MG/1
40 TABLET ORAL DAILY
Qty: 40 TAB | Refills: 3 | Status: SHIPPED | OUTPATIENT
Start: 2019-10-15 | End: 2019-11-04

## 2019-10-15 RX ORDER — OXYCODONE HCL 5 MG/5 ML
10 SOLUTION, ORAL ORAL
Status: DISCONTINUED | OUTPATIENT
Start: 2019-10-15 | End: 2019-10-15 | Stop reason: HOSPADM

## 2019-10-15 RX ORDER — SODIUM CHLORIDE, SODIUM LACTATE, POTASSIUM CHLORIDE, CALCIUM CHLORIDE 600; 310; 30; 20 MG/100ML; MG/100ML; MG/100ML; MG/100ML
INJECTION, SOLUTION INTRAVENOUS CONTINUOUS
Status: DISCONTINUED | OUTPATIENT
Start: 2019-10-15 | End: 2019-10-15 | Stop reason: HOSPADM

## 2019-10-15 RX ORDER — MIDAZOLAM HYDROCHLORIDE 1 MG/ML
1 INJECTION INTRAMUSCULAR; INTRAVENOUS
Status: DISCONTINUED | OUTPATIENT
Start: 2019-10-15 | End: 2019-10-15 | Stop reason: HOSPADM

## 2019-10-15 RX ORDER — DEXAMETHASONE SODIUM PHOSPHATE 4 MG/ML
INJECTION, SOLUTION INTRA-ARTICULAR; INTRALESIONAL; INTRAMUSCULAR; INTRAVENOUS; SOFT TISSUE PRN
Status: DISCONTINUED | OUTPATIENT
Start: 2019-10-15 | End: 2019-10-15 | Stop reason: SURG

## 2019-10-15 RX ORDER — OXYCODONE HCL 5 MG/5 ML
5 SOLUTION, ORAL ORAL
Status: DISCONTINUED | OUTPATIENT
Start: 2019-10-15 | End: 2019-10-15 | Stop reason: HOSPADM

## 2019-10-15 RX ORDER — HALOPERIDOL 5 MG/ML
1 INJECTION INTRAMUSCULAR
Status: DISCONTINUED | OUTPATIENT
Start: 2019-10-15 | End: 2019-10-15 | Stop reason: HOSPADM

## 2019-10-15 RX ORDER — MEPERIDINE HYDROCHLORIDE 25 MG/ML
6.25 INJECTION INTRAMUSCULAR; INTRAVENOUS; SUBCUTANEOUS
Status: DISCONTINUED | OUTPATIENT
Start: 2019-10-15 | End: 2019-10-15 | Stop reason: HOSPADM

## 2019-10-15 RX ORDER — LABETALOL HYDROCHLORIDE 5 MG/ML
5 INJECTION, SOLUTION INTRAVENOUS
Status: DISCONTINUED | OUTPATIENT
Start: 2019-10-15 | End: 2019-10-15 | Stop reason: HOSPADM

## 2019-10-15 RX ORDER — DOXYCYCLINE 100 MG/1
100 TABLET ORAL EVERY 12 HOURS
Status: DISCONTINUED | OUTPATIENT
Start: 2019-10-15 | End: 2019-10-15 | Stop reason: HOSPADM

## 2019-10-15 RX ORDER — DIPHENHYDRAMINE HYDROCHLORIDE 50 MG/ML
12.5 INJECTION INTRAMUSCULAR; INTRAVENOUS
Status: DISCONTINUED | OUTPATIENT
Start: 2019-10-15 | End: 2019-10-15 | Stop reason: HOSPADM

## 2019-10-15 RX ORDER — PREDNISONE 20 MG/1
40 TABLET ORAL
Status: DISCONTINUED | OUTPATIENT
Start: 2019-10-15 | End: 2019-10-15 | Stop reason: HOSPADM

## 2019-10-15 RX ORDER — HYDRALAZINE HYDROCHLORIDE 20 MG/ML
5 INJECTION INTRAMUSCULAR; INTRAVENOUS
Status: DISCONTINUED | OUTPATIENT
Start: 2019-10-15 | End: 2019-10-15 | Stop reason: HOSPADM

## 2019-10-15 RX ADMIN — SODIUM CHLORIDE, POTASSIUM CHLORIDE, SODIUM LACTATE AND CALCIUM CHLORIDE: 600; 310; 30; 20 INJECTION, SOLUTION INTRAVENOUS at 12:32

## 2019-10-15 RX ADMIN — PROPOFOL 200 MG: 10 INJECTION, EMULSION INTRAVENOUS at 13:24

## 2019-10-15 RX ADMIN — IPRATROPIUM BROMIDE AND ALBUTEROL SULFATE 3 ML: .5; 3 SOLUTION RESPIRATORY (INHALATION) at 15:01

## 2019-10-15 RX ADMIN — FENTANYL CITRATE 100 MCG: 50 INJECTION, SOLUTION INTRAMUSCULAR; INTRAVENOUS at 13:24

## 2019-10-15 RX ADMIN — OMEPRAZOLE 40 MG: 20 CAPSULE, DELAYED RELEASE ORAL at 06:10

## 2019-10-15 RX ADMIN — SODIUM CHLORIDE: 9 INJECTION, SOLUTION INTRAVENOUS at 02:30

## 2019-10-15 RX ADMIN — IPRATROPIUM BROMIDE AND ALBUTEROL SULFATE 3 ML: .5; 3 SOLUTION RESPIRATORY (INHALATION) at 10:28

## 2019-10-15 RX ADMIN — CEFTRIAXONE SODIUM 2 G: 2 INJECTION, POWDER, FOR SOLUTION INTRAMUSCULAR; INTRAVENOUS at 06:11

## 2019-10-15 RX ADMIN — LISINOPRIL 20 MG: 20 TABLET ORAL at 06:11

## 2019-10-15 RX ADMIN — IOHEXOL 75 ML: 350 INJECTION, SOLUTION INTRAVENOUS at 11:18

## 2019-10-15 RX ADMIN — IPRATROPIUM BROMIDE AND ALBUTEROL SULFATE 3 ML: .5; 3 SOLUTION RESPIRATORY (INHALATION) at 06:38

## 2019-10-15 RX ADMIN — DEXAMETHASONE SODIUM PHOSPHATE 4 MG: 4 INJECTION, SOLUTION INTRAMUSCULAR; INTRAVENOUS at 13:24

## 2019-10-15 RX ADMIN — ROCURONIUM BROMIDE 25 MG: 10 INJECTION INTRAVENOUS at 13:24

## 2019-10-15 RX ADMIN — LIDOCAINE HYDROCHLORIDE 100 MG: 20 INJECTION, SOLUTION INFILTRATION; PERINEURAL at 13:24

## 2019-10-15 RX ADMIN — FLUTICASONE PROPIONATE 220 MCG: 110 AEROSOL, METERED RESPIRATORY (INHALATION) at 06:38

## 2019-10-15 RX ADMIN — MIDAZOLAM HYDROCHLORIDE 2 MG: 1 INJECTION, SOLUTION INTRAMUSCULAR; INTRAVENOUS at 13:22

## 2019-10-15 RX ADMIN — LEVOTHYROXINE SODIUM 88 MCG: 88 TABLET ORAL at 06:11

## 2019-10-15 RX ADMIN — SUGAMMADEX 200 MG: 100 INJECTION, SOLUTION INTRAVENOUS at 13:45

## 2019-10-15 RX ADMIN — ENOXAPARIN SODIUM 40 MG: 100 INJECTION SUBCUTANEOUS at 06:11

## 2019-10-15 RX ADMIN — ONDANSETRON 4 MG: 2 INJECTION INTRAMUSCULAR; INTRAVENOUS at 13:24

## 2019-10-15 ASSESSMENT — PAIN SCALES - GENERAL: PAIN_LEVEL: 0

## 2019-10-15 ASSESSMENT — ENCOUNTER SYMPTOMS
CARDIOVASCULAR NEGATIVE: 1
EYES NEGATIVE: 1
NEUROLOGICAL NEGATIVE: 1
SPUTUM PRODUCTION: 1
CHILLS: 1
PSYCHIATRIC NEGATIVE: 1
FEVER: 1
MUSCULOSKELETAL NEGATIVE: 1
COUGH: 1
WEIGHT LOSS: 0
GASTROINTESTINAL NEGATIVE: 1
SHORTNESS OF BREATH: 1

## 2019-10-15 NOTE — ANESTHESIA PROCEDURE NOTES
Airway  Date/Time: 10/15/2019 1:25 PM  Performed by: Zachary Lam M.D.  Authorized by: Zachary Lam M.D.     Location:  OR  Urgency:  Elective  Difficult Airway: No    Indications for Airway Management:  Anesthesia  Spontaneous Ventilation: absent    Sedation Level:  Deep  Preoxygenated: Yes    Patient Position:  Sniffing  MILS Maintained Throughout: No    Mask Difficulty Assessment:  1 - vent by mask  Final Airway Type:  Endotracheal airway  Final Endotracheal Airway:  ETT  Cuffed: Yes    Technique Used for Successful ETT Placement:  Direct laryngoscopy  Insertion Site:  Oral  Blade Type:  Molly  Laryngoscope Blade/Videolaryngoscope Blade Size:  3  ETT Size (mm):  8.0  Measured from:  Teeth  ETT to Teeth (cm):  20  Placement Verified by: auscultation and capnometry    Cormack-Lehane Classification:  Grade I - full view of glottis  Number of Attempts at Approach:  1

## 2019-10-15 NOTE — OR NURSING
1355: Pt awake and alert. No pain or nausea.  1425: No change. Meets criteria for transfer to room.

## 2019-10-15 NOTE — PROGRESS NOTES
Critical Care Progress Note    Date of admission  10/13/2019    Chief Complaint  59 y.o. female admitted 10/13/2019 with with SOB and fever and cough   Hospital Course    started on ceftriaxone       Interval Problem Update  Reviewed last 24 hour events:  CTA negative for PE , mild bronchiectasis, mild B/L lower lobes GGO   For bronchoscopy with BAL today     Review of Systems  Review of Systems   Constitutional: Positive for chills and fever. Negative for malaise/fatigue and weight loss.   HENT: Negative.    Eyes: Negative.    Respiratory: Positive for cough, sputum production and shortness of breath.    Cardiovascular: Negative.    Gastrointestinal: Negative.    Genitourinary: Negative.    Musculoskeletal: Negative.    Skin: Negative for itching and rash.   Neurological: Negative.    Endo/Heme/Allergies: Negative.    Psychiatric/Behavioral: Negative.         Vital Signs for last 24 hours   Temp:  [36.3 °C (97.3 °F)-36.8 °C (98.3 °F)] 36.7 °C (98 °F)  Pulse:  [60-89] 89  Resp:  [16-20] 17  BP: (132-156)/(60-79) 135/69  SpO2:  [92 %-96 %] 93 %    Hemodynamic parameters for last 24 hours       Respiratory Information for the last 24 hours       Physical Exam   Physical Exam   Constitutional: She is oriented to person, place, and time. She appears well-developed and well-nourished. No distress.   HENT:   Head: Normocephalic and atraumatic.   Right Ear: External ear normal.   Nose: Nose normal.   Mouth/Throat: Oropharynx is clear and moist. No oropharyngeal exudate.   Eyes: Pupils are equal, round, and reactive to light. Right eye exhibits no discharge. Left eye exhibits no discharge. No scleral icterus.   Neck: Neck supple. No JVD present. No tracheal deviation present. No thyromegaly present.   Cardiovascular: Normal rate. Exam reveals no friction rub.   No murmur heard.  Pulmonary/Chest: Effort normal and breath sounds normal. She has no rales.   Bibasilar rales    Abdominal: Soft. Bowel sounds are normal. She  exhibits no distension. There is no tenderness. There is no rebound.   Musculoskeletal: Normal range of motion. She exhibits no edema, tenderness or deformity.   Neurological: She is alert and oriented to person, place, and time. She displays normal reflexes. No cranial nerve deficit. Coordination normal.   Skin: Skin is warm and dry. No rash noted. She is not diaphoretic. No erythema.       Medications  Current Facility-Administered Medications   Medication Dose Route Frequency Provider Last Rate Last Dose   • [MAR Hold] predniSONE (DELTASONE) tablet 40 mg  40 mg Oral QHS Vasquez Frazier M.D.       • [MAR Hold] doxycycline monohydrate (ADOXA) tablet 100 mg  100 mg Oral Q12HRS Austin Bass M.D.   Stopped at 10/15/19 0930   • lactated ringers infusion   Intravenous Continuous Rosa Elena Richard M.D. 10 mL/hr at 10/15/19 1232     • [MAR Hold] cefTRIAXone (ROCEPHIN) 2 g in  mL IVPB  2 g Intravenous Q24HRS Vasquez Frazier M.D.   Stopped at 10/15/19 0641   • [MAR Hold] ipratropium-albuterol (DUONEB) nebulizer solution  3 mL Nebulization 4X/DAY (RT) Vasquez Frazier M.D.   3 mL at 10/15/19 1028   • [MAR Hold] zolpidem (AMBIEN) tablet 10 mg  10 mg Oral HS PRN Vasquez Frazier M.D.   10 mg at 10/14/19 2058   • [MAR Hold] omeprazole (PRILOSEC) capsule 40 mg  40 mg Oral DAILY Rosalina Amaya M.D.   40 mg at 10/15/19 0610   • [MAR Hold] albuterol inhaler 2 Puff  2 Puff Inhalation Q6HRS PRN Daxa Lopez D.RADHA   Stopped at 10/13/19 2158   • [MAR Hold] levothyroxine (SYNTHROID) tablet 88 mcg  88 mcg Oral AM ES BETHANIE RobbinsOPhuong   88 mcg at 10/15/19 0611   • [MAR Hold] lisinopril (PRINIVIL) tablet 20 mg  20 mg Oral DAILY RAMAN Robbins.O.   20 mg at 10/15/19 0611   • [MAR Hold] senna-docusate (PERICOLACE or SENOKOT S) 8.6-50 MG per tablet 2 Tab  2 Tab Oral BID Daxa Lopez D.O.        And   • [MAR Hold] polyethylene glycol/lytes (MIRALAX) PACKET 1 Packet  1 Packet Oral QDAY PRN Daxa ESTES  SAI Lopez        And   • [MAR Hold] magnesium hydroxide (MILK OF MAGNESIA) suspension 30 mL  30 mL Oral QDAY PRN Daxa Lopez D.O.        And   • [MAR Hold] bisacodyl (DULCOLAX) suppository 10 mg  10 mg Rectal QDAY PRN Daxa Lopez D.O.       • NS infusion   Intravenous Continuous Daxa Lopez D.O. 100 mL/hr at 10/15/19 0230     • [MAR Hold] enoxaparin (LOVENOX) inj 40 mg  40 mg Subcutaneous DAILY Daxa Lopez D.O.   40 mg at 10/15/19 0611   • [MAR Hold] acetaminophen (TYLENOL) tablet 650 mg  650 mg Oral Q6HRS PRN Daxa Lopez D.O.       • [MAR Hold] ondansetron (ZOFRAN) syringe/vial injection 4 mg  4 mg Intravenous Q4HRS PRN Daxa Lopez D.O.       • [MAR Hold] ondansetron (ZOFRAN ODT) dispertab 4 mg  4 mg Oral Q4HRS PRN Daxa Lopez D.O.       • [MAR Hold] promethazine (PHENERGAN) tablet 12.5-25 mg  12.5-25 mg Oral Q4HRS PRN Daxa Lopez D.O.       • [MAR Hold] promethazine (PHENERGAN) suppository 12.5-25 mg  12.5-25 mg Rectal Q4HRS PRN Daxa Lopez D.O.       • [MAR Hold] prochlorperazine (COMPAZINE) injection 5-10 mg  5-10 mg Intravenous Q4HRS PRN Daxa Lopez D.O.       • [MAR Hold] Respiratory Care per Protocol   Nebulization Continuous RT Daxa Lopez D.O.       • [MAR Hold] ipratropium-albuterol (DUONEB) nebulizer solution  3 mL Nebulization Q4H PRN (RT) Daxa Lopez D.O.   Stopped at 10/13/19 2158   • [MAR Hold] fluticasone (FLOVENT HFA) 110 MCG/ACT inhaler 220 mcg  2 Puff Inhalation Q12HRS (RT) Daxa Lopez D.O.   220 mcg at 10/15/19 0638       Fluids  No intake or output data in the 24 hours ending 10/15/19 1315    Laboratory          Recent Labs     10/13/19  1334 10/14/19  0620 10/15/19  0412   SODIUM 140 140 139   POTASSIUM 3.9 4.1 4.6   CHLORIDE 105 105 102   CO2 20 22 22   BUN 11 9 8   CREATININE 0.59 0.60 0.55   PHOSPHORUS  --  2.8  --    CALCIUM 9.3 8.9 9.0     Recent Labs     10/13/19  1334 10/14/19  0620 10/15/19  0412   ALTSGPT  18  --  12   ASTSGOT 20  --  12   ALKPHOSPHAT 66  --  51   TBILIRUBIN 0.7  --  0.4   GLUCOSE 116* 187* 151*     Recent Labs     10/13/19  1334 10/14/19  0620 10/15/19  0412   WBC 19.0* 12.1* 8.2   NEUTSPOLYS 92.10* 95.10* 88.30*   LYMPHOCYTES 2.70* 2.70* 6.10*   MONOCYTES 3.90 1.70 4.00   EOSINOPHILS 0.30 0.00 0.70   BASOPHILS 0.60 0.20 0.40   ASTSGOT 20  --  12   ALTSGPT 18  --  12   ALKPHOSPHAT 66  --  51   TBILIRUBIN 0.7  --  0.4     Recent Labs     10/13/19  1334 10/14/19  0620 10/15/19  0412   RBC 5.75* 4.95 4.93   HEMOGLOBIN 17.1* 14.5 14.6   HEMATOCRIT 50.7* 44.8 44.9   PLATELETCT 264 224 239   PROTHROMBTM 12.6  --  12.3   INR 0.93  --  0.91       Imaging  X-Ray:  I have personally reviewed the images and compared with prior images.    Assessment/Plan  Interstitial lung disease (HCC)- (present on admission)  Assessment & Plan  Unclear etiology  HRCT scan showed improvement, but cont to show GG opacities   CTA this admission was negative to PE, cont ot show GGO  serology for ILD was negative   Pts symptoms of SOB and cough are intermittent, usually start suddenly and improve after few days. however she does have baseline impairment  with GURROLA and hypoxemia on exertions   She does have persistent bibasilar crackles   Chronic microaspiration with subsequent pneumonia/ bronchiolitis is a possibility   Agree with abs because patient was febrile and had leukocytosis and left sift    bronchoscopy with BAL to better assess for active infection. If BAL was negative for infection   Pt may be started on a course of steroids since this was tried before and pt improved     Pt can be discharged after bronch from pulmonary standpoint   She has appointment with Dr Pitt,  The patient primary pulmonologist on Thursday with PFTs         I have performed a physical exam and reviewed and updated ROS and Plan today (10/15/2019). In review of yesterday's note (10/14/2019), there are no changes except as documented above.      Discussed patient condition and risk of morbidity and/or mortality with Hospitalist and Patient

## 2019-10-15 NOTE — DISCHARGE SUMMARY
Discharge Summary    CHIEF COMPLAINT ON ADMISSION  Chief Complaint   Patient presents with   • Cough   • Shortness of Breath       Reason for Admission  SOB; Rapid Heart Beat     Admission Date  10/13/2019    CODE STATUS  Prior    HPI & HOSPITAL COURSE  This is a 59 y.o. female here with dyspnea. She was  Found to have acute on chronic respiratory failure. She has a known history of what is thought to be hypersensitivity pneumonitis vs possible ILD. She was treated with steroids and empiric antibiotics. She was seen by pulmonary medicine and had a bronchoscopy on 10/15/19 that essentially normal. Biopsy results are pending. She will be discharged on steroid therapy and has follow up this week with pulmonary medicine. It is not thought that she had an infectious process. Her imaging shows ground glass opacities as detailed below.     cta chest:  No pulmonary emboli identified.  Hyperexpanded lungs.  Increasing bilateral lower lobe and lingular groundglass opacities consistent with pneumonitis.  Bilateral lower lobe mild bronchiectasis and subsegmental atelectasis. No pleural effusions.           Therefore, she is discharged in good and stable condition to home with close outpatient follow-up.    The patient met 2-midnight criteria for an inpatient stay at the time of discharge.    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 *      FOLLOW UP  Future Appointments   Date Time Provider Department Center   10/17/2019  2:15 PM [id number]-[id number] PULM None   10/17/2019  3:15 PM Irais Pitt M.D. PULM None   12/16/2019  4:15 PM Irais Pitt M.D. PULM None   1/13/2020  4:00 PM MIKHAIL OrrRLAVON [facility] [place]     MIKHAIL OrrRLAVON  [address]  [address]  [place] [address]  [phone]          Alcon Handy M.D.  [address]  [address]  [place] [id number] [phone]            MEDICATIONS ON DISCHARGE     Medication List      START taking these medications      Instructions   doxycycline monohydrate 100 MG tablet  Commonly known as:  ADOXA   Take 1 Tab by mouth every 12 hours for 7 days.  Dose:  100 mg     predniSONE 20 MG Tabs  Commonly known as:  DELTASONE   Take 2 Tabs by mouth every day for 20 days. Please do not stop. Please refill and discuss getting off this with pulmonary medicine.  Dose:  40 mg        CONTINUE taking these medications      Instructions   albuterol 108 (90 Base) MCG/ACT Aers inhalation aerosol   Inhale 2 Puffs by mouth every 6 hours as needed for Shortness of Breath.  Dose:  2 Puff     CALCIUM PO   Take 1 Tab by mouth every day.  Dose:  1 Tab     cyanocobalamin 500 MCG Tabs  Commonly known as:  VITAMIN B-12   Take 500 mcg by mouth every day.  Dose:  500 mcg     DEXILANT 60 MG Cpdr delayed-release capsule  Generic drug:  Dexlansoprazole   Take 60 mg by mouth every day.  Dose:  60 mg     fish oil 1000 MG Caps capsule   Take 1,000 mg by mouth every day.  Dose:  1,000 mg     fluticasone 110 MCG/ACT Aero  Commonly known as:  FLOVENT HFA   Inhale 2 Puffs by mouth every 12 hours.  Dose:  2 Puff     ibuprofen 200 MG Tabs  Commonly known as:  MOTRIN   Take 400 mg by mouth every 6 hours as needed for Mild Pain.  Dose:  400 mg      IRON PO   Take 1 Tab by mouth every evening.  Dose:  1 Tab     levothyroxine 88 MCG Tabs  Commonly known as:  SYNTHROID   Take 88 mcg by mouth Every morning on an empty stomach.  Dose:  88 mcg     lisinopril 20 MG Tabs  Commonly known as:  PRINIVIL   Take 20 mg by mouth every day.  Dose:  20 mg     magnesium gluconate 500 MG tablet  Commonly known as:  MAG-G   Take 500 mg by mouth every day.  Dose:  500 mg     therapeutic multivitamin-minerals Tabs   Take 1 Tab by mouth every day.  Dose:  1 Tab     VITAMIN C PO   Take 1 Tab by mouth every evening.  Dose:  1 Tab     vitamin D 1000 UNIT Tabs  Commonly known as:  cholecalciferol   Take 1,000 Units by mouth every day.  Dose:  1,000 Units            Allergies  Allergies   Allergen Reactions   • Codeine      Pt reports that it makes her feel weird    • Sulfa Drugs Rash     .       DIET  No orders of the defined types were placed in this encounter.      ACTIVITY  As tolerated.  Weight bearing as tolerated    CONSULTATIONS  Pulmonary medicine    PROCEDURES  bronchoscopy    LABORATORY  Lab Results   Component Value Date    SODIUM 139 10/15/2019    POTASSIUM 4.6 10/15/2019    CHLORIDE 102 10/15/2019    CO2 22 10/15/2019    GLUCOSE 151 (H) 10/15/2019    BUN 8 10/15/2019    CREATININE 0.55 10/15/2019        Lab Results   Component Value Date    WBC 8.2 10/15/2019    HEMOGLOBIN 14.6 10/15/2019    HEMATOCRIT 44.9 10/15/2019    PLATELETCT 239 10/15/2019        Total time of the discharge process exceeds 35 minutes.

## 2019-10-15 NOTE — ANESTHESIA PREPROCEDURE EVALUATION
60 yo female with hypoxemia  Hypersensitivity pneumonitis vs interstitial lung dz    Relevant Problems   PULMONARY   (+) CAP (community acquired pneumonia)   (+) Moderate persistent asthma      CARDIAC   (+) Essential hypertension      GI   (+) Chronic GERD      ENDO   (+) Postoperative hypothyroidism   (+) Type 2 diabetes mellitus without complication, without long-term current use of insulin (HCC)       Physical Exam    Airway   Mallampati: II  TM distance: >3 FB  Neck ROM: full       Cardiovascular - normal exam  Rhythm: regular  Rate: normal  (-) murmur     Dental    Pulmonary   (+) rales     Abdominal   (+) obese  Abdomen: soft     Neurological - normal exam                 Anesthesia Plan    ASA 4   ASA physical status 4 criteria: respiratory failure    Plan - general       Airway plan will be ETT        Induction: intravenous    Postoperative Plan: Postoperative administration of opioids is intended.    Pertinent diagnostic labs and testing reviewed    Informed Consent:    Anesthetic plan and risks discussed with patient.    Use of blood products discussed with: patient whom consented to blood products.

## 2019-10-15 NOTE — ANESTHESIA TIME REPORT
Anesthesia Start and Stop Event Times     Date Time Event    10/15/2019 1141 Ready for Procedure     1322 Anesthesia Start     1352 Anesthesia Stop        Responsible Staff  10/15/19    Name Role Begin End    Zachary Lam M.D. Anesth 1322 1352        Preop Diagnosis (Free Text):  Pre-op Diagnosis     shortness of breath        Preop Diagnosis (Codes):  Diagnosis Information     Diagnosis Code(s): Shortness of breath [R06.02]        Post op Diagnosis  Hypoxemia      Premium Reason  Non-Premium    Comments:

## 2019-10-15 NOTE — DIETARY
"Nutrition services: Day 1 of admit.  Lillian Addison is a 59 y.o. female with admitting DX of Acute respiratory failure with hypoxia (HCC), CAP (community acquired pneumonia).   Consult received for MST score of 3 due to report of wt loss.      Assessment:  Height: 170.2 cm (5' 7\")  Weight: 75.8 kg (167 lb 1.7 oz)  Body mass index is 26.17 kg/m²., BMI classification: overweight  Diet/Intake: cardiac/ no recorded PO intake    Evaluation:   1. Pt reports 25# (13%) wt loss x 1 year due to eating better and less per pt but this is her 4th hospitalization this year which also has contributed to weight loss. She is avoiding bread and asked to have this removed from her menu.   2. Labs: 10/14: glucose=187  3. Meds: prednisone.     Malnutrition Risk: Criteria not met    Recommendations/Plan:  1. Provide diet as ordered. No bread   2. Encourage intake of >50%  3. Document intake of all meals as % taken in ADL's to provide interdisciplinary communication across all shifts.   4. Monitor weight.  5. Nutrition rep will continue to see patient for ongoing meal and snack preferences.   6. RD will monitor.           "

## 2019-10-15 NOTE — FLOWSHEET NOTE
10/15/19 1028   Interdisciplinary Plan of Care-Goals (Indications)   Bronchodilator Indications History / Diagnosis   Interdisciplinary Plan of Care-Outcomes    Bronchodilator Outcome Patient at Stable Baseline   RT Assessment of Delivered Medications   Evaluation of Medication Delivery Daily Yes-- Pt /Family has been Instructed in use of Respiratory Medications and Adverse Reactions   SVN Group   #SVN Performed Yes   Given By: Mouthpiece   Respiratory WDL   Respiratory (WDL) X   Chest Exam   Respiration 16   Heart Rate (Monitored) 72   Breath Sounds   Pre/Post Intervention Pre Intervention Assessment   RUL Breath Sounds Fine Crackles   RML Breath Sounds Fine Crackles   RLL Breath Sounds Fine Crackles;Diminished   BECCA Breath Sounds Fine Crackles   LLL Breath Sounds Fine Crackles;Diminished   Oxygen   O2 (LPM) 3   O2 Daily Delivery Respiratory  Silicone Nasal Cannula

## 2019-10-15 NOTE — PROGRESS NOTES
Called CT to see if pt will be fit in before her bronch, they said to bring her down now, pt brought down in wheelchair with o2.

## 2019-10-15 NOTE — PROGRESS NOTES
Pt reports taking 40mg prilosec qday. Pt also requesting a sleep aid, states she takes 10mg ambien at home.  MD valle notified, orders received.

## 2019-10-15 NOTE — FLOWSHEET NOTE
10/15/19 1322   Bronchoscopy Procedure   Bronchoscopy Procedure Yes   Order placed in Epic? Yes   Time Out Performed Yes   Medication Allergy Reviewed Yes   #Diagnostic Procedure  Yes   Start Time 1322   End Time 1346   Performing Physician Hilary   Picture/Video Obtained Labeled with Patients Identification, Date and Time, Place in Chart   Scope Serial Number [id number]   Post Procedure Response   (extubated by anesthia taken to recovery)

## 2019-10-15 NOTE — PROGRESS NOTES
Pt stated that she takes 40 mg of Omeprazole daily q AM; MD Patel notified & order updated/changed;

## 2019-10-15 NOTE — ASSESSMENT & PLAN NOTE
Unclear etiology  HRCT scan showed improvement, but cont to show GG opacities   serology for ILD was negative   Pts symptoms of SOB and cough are intermittent   She does have persistent bibasilar crackles   Chronic microaspiration with subsequent pneumonia/ bronchiolitis is a possibility   Agree with abs because patient was febrile and had leukocytosis and left sift   Will try to do bronchoscopy with BAL to better assess for active infection. If BAL was negative for infection   Pt may be started on a course of steroids since this was tried before and pt improved

## 2019-10-15 NOTE — FLOWSHEET NOTE
10/15/19 0639   Interdisciplinary Plan of Care-Goals (Indications)   Bronchodilator Indications History / Diagnosis   Interdisciplinary Plan of Care-Outcomes    Bronchodilator Outcome Patient at Stable Baseline   Education   Education Yes - Pt. / Family has been Instructed in use of Respiratory Equipment;Yes - Pt. / Family has been Instructed in use of Respiratory Medications and Adverse Reactions   RT Assessment of Delivered Medications   Evaluation of Medication Delivery Daily Yes-- Pt /Family has been Instructed in use of Respiratory Medications and Adverse Reactions   SVN Group   #SVN Performed Yes   Given By: Mouthpiece   Respiratory WDL   Respiratory (WDL) X   Chest Exam   Respiration 20   Heart Rate (Monitored) 70   Breath Sounds   Pre/Post Intervention Pre Intervention Assessment   RUL Breath Sounds Fine Crackles   RML Breath Sounds Fine Crackles   RLL Breath Sounds Fine Crackles;Diminished   BECCA Breath Sounds Fine Crackles   LLL Breath Sounds Fine Crackles;Diminished   Oximetry   #Pulse Oximetry (Single Determination) Yes   Oxygen   Pulse Oximetry 92 %   O2 (LPM) 3   O2 Daily Delivery Respiratory  Silicone Nasal Cannula

## 2019-10-15 NOTE — CONSULTS
Pulmonary  Consultation    Date of consult: 10/14/2019    Referring Physician  Vasquez Frazier M.D.    Reason for Consultation  SOB    History of Presenting Illness  59 y.o. female who presented 10/13/2019 with fever, worsening cough and SOB. Pt was admitted last month with similar problem. She was found to be hypoxemic she was discharged on O2 supplementation. Since then she had HRCT which showed significant improvement of previously seen groundless opacities. But cont to show subtle abnormalities in lung parenchyma suggestive of underlying ILD,     Pt cont to have GURROLA, hypoxemia and she had fever and leukocytosis at presentation. However pro calcitonin was normal     serology for ILD during last admission was negative, no clear history to support HP.     Pt is scheduled to be seen in pulmonary clinic next Thursday with PFT     She is also scheduled to be seen by thoracic surgery for presumed chronic aspiration with history of gastric bypass surgery in the past. Upper GI series last admission was consistent with GERD     Code Status  Full Code    Review of Systems  ROS  Negative other than what mentioned in HPI   Past Medical History   has a past medical history of Asthma, Diabetes (HCC), Hyperlipidemia, Hypertension, and Thyroid disease.    Surgical History   has a past surgical history that includes pr c-sec only,prev c-sec; bronchoscopy-endo (4/23/2019); bronchoscopy/ndl bx (4/23/2019); primary c section; and gastrectomy (2006).    Family History  family history includes Cancer in her father, maternal grandmother, and sister; Cancer (age of onset: 55) in her mother; Diabetes in her father; Heart Disease in her mother.    Social History   reports that she quit smoking about 25 years ago. Her smoking use included cigarettes. She has a 20.00 pack-year smoking history. She has never used smokeless tobacco. She reports that she drinks about 0.6 - 1.2 oz of alcohol per week. She reports that she has current or past  drug history. Drug: Marijuana.    Medications  Home Medications     Reviewed by Mary Cervantes R.N. (Registered Nurse) on 10/13/19 at 1623  Med List Status: Complete   Medication Last Dose Status   albuterol 108 (90 Base) MCG/ACT Aero Soln inhalation aerosol 10/13/2019 Active   Ascorbic Acid (VITAMIN C PO) 10/12/2019 Active   CALCIUM PO 10/12/2019 Active   cyanocobalamin (VITAMIN B-12) 500 MCG Tab 10/12/2019 Active   Dexlansoprazole (DEXILANT) 60 MG CAPSULE DELAYED RELEASE delayed-release capsule 10/13/2019 Active   fluticasone (FLOVENT HFA) 110 MCG/ACT Aerosol 10/13/2019 Active   ibuprofen (MOTRIN) 200 MG Tab 10/13/2019 Active   IRON PO 10/12/2019 Active   levothyroxine (SYNTHROID) 88 MCG Tab 10/13/2019 Active   lisinopril (PRINIVIL) 20 MG TABS 10/13/2019 Active   magnesium gluconate (MAG-G) 500 MG tablet 10/12/2019 Active   Omega-3 Fatty Acids (FISH OIL) 1000 MG Cap capsule 10/12/2019 Active   therapeutic multivitamin-minerals (THERAGRAN-M) Tab 10/12/2019 Active   vitamin D (CHOLECALCIFEROL) 1000 UNIT Tab 10/12/2019 Active              Current Facility-Administered Medications   Medication Dose Route Frequency Provider Last Rate Last Dose   • cefTRIAXone (ROCEPHIN) 2 g in  mL IVPB  2 g Intravenous Q24HRS Vasquez Frazier M.D.   Stopped at 10/14/19 1730   • ipratropium-albuterol (DUONEB) nebulizer solution  3 mL Nebulization 4X/DAY (RT) Vasquez Frazier M.D.   3 mL at 10/14/19 2004   • [START ON 10/15/2019] omeprazole (PRILOSEC) capsule 20 mg  20 mg Oral Q12HRS Vasquez Frazier M.D.       • zolpidem (AMBIEN) tablet 10 mg  10 mg Oral HS PRN Vasquez Frazier M.D.   10 mg at 10/14/19 2058   • predniSONE (DELTASONE) tablet 40 mg  40 mg Oral DAILY Vasquez Frazier M.D.   40 mg at 10/14/19 2143   • albuterol inhaler 2 Puff  2 Puff Inhalation Q6HRS PRN Daxa Lopez D.O.   Stopped at 10/13/19 2158   • levothyroxine (SYNTHROID) tablet 88 mcg  88 mcg Oral AM ES Daxa Lopez D.O.   88 mcg at  10/14/19 0542   • lisinopril (PRINIVIL) tablet 20 mg  20 mg Oral DAILY BETHANIE RobbinsOPhuong   20 mg at 10/14/19 0537   • senna-docusate (PERICOLACE or SENOKOT S) 8.6-50 MG per tablet 2 Tab  2 Tab Oral BID Daxa Lopez D.O.        And   • polyethylene glycol/lytes (MIRALAX) PACKET 1 Packet  1 Packet Oral QDAY PRN Daxa Lopez D.O.        And   • magnesium hydroxide (MILK OF MAGNESIA) suspension 30 mL  30 mL Oral QDAY PRN BETHANIE RobbinsOPhuong        And   • bisacodyl (DULCOLAX) suppository 10 mg  10 mg Rectal QDAY PRN BETHANIE RobbinsO.       • NS infusion   Intravenous Continuous Daxa Lopez D.O. 100 mL/hr at 10/14/19 1528     • enoxaparin (LOVENOX) inj 40 mg  40 mg Subcutaneous DAILY BETHANIE RobbinsOPhuong   40 mg at 10/14/19 0536   • acetaminophen (TYLENOL) tablet 650 mg  650 mg Oral Q6HRS PRN BETHANIE RobbinsO.       • ondansetron (ZOFRAN) syringe/vial injection 4 mg  4 mg Intravenous Q4HRS PRN BETHANIE RobbinsO.       • ondansetron (ZOFRAN ODT) dispertab 4 mg  4 mg Oral Q4HRS PRN BETHANIE RobbinsO.       • promethazine (PHENERGAN) tablet 12.5-25 mg  12.5-25 mg Oral Q4HRS PRN BETHANIE RobbinsO.       • promethazine (PHENERGAN) suppository 12.5-25 mg  12.5-25 mg Rectal Q4HRS PRN BETHANIE RobbinsO.       • prochlorperazine (COMPAZINE) injection 5-10 mg  5-10 mg Intravenous Q4HRS PRN RAMAN Robbins.O.       • Respiratory Care per Protocol   Nebulization Continuous RT Daxa Lopez D.O.       • ipratropium-albuterol (DUONEB) nebulizer solution  3 mL Nebulization Q4H PRN (RT) Daxa Lopez D.O.   Stopped at 10/13/19 2158   • fluticasone (FLOVENT HFA) 110 MCG/ACT inhaler 220 mcg  2 Puff Inhalation Q12HRS (RT) Daxa Lopez D.O.   220 mcg at 10/14/19 2008       Allergies  Allergies   Allergen Reactions   • Codeine      Pt reports that it makes her feel weird    • Sulfa Drugs Rash     .       Vital Signs last 24 hours  Temp:  [36.4 °C (97.6 °F)-36.8 °C (98.3  °F)] 36.8 °C (98.2 °F)  Pulse:  [] 79  Resp:  [16-20] 18  BP: (133-155)/(58-79) 133/79  SpO2:  [92 %-98 %] 93 %    Physical Exam  Physical Exam   Constitutional: She is oriented to person, place, and time. She appears well-developed and well-nourished. No distress.   HENT:   Head: Normocephalic and atraumatic.   Eyes: Pupils are equal, round, and reactive to light. Right eye exhibits no discharge. Left eye exhibits no discharge. No scleral icterus.   Neck: Neck supple. No JVD present. No tracheal deviation present. No thyromegaly present.   Cardiovascular: Normal rate and regular rhythm. Exam reveals no friction rub.   No murmur heard.  Pulmonary/Chest: Effort normal.   Bibasilar crackles, no wheezing    Abdominal: Soft. Bowel sounds are normal. She exhibits no distension.   Musculoskeletal: She exhibits no edema or deformity.   Neurological: She is alert and oriented to person, place, and time.   Skin: Skin is warm and dry. No rash noted. She is not diaphoretic. No erythema. No pallor.       Fluids  No intake or output data in the 24 hours ending 10/14/19 2212    Laboratory  Recent Results (from the past 48 hour(s))   EKG    Collection Time: 10/13/19 12:42 PM   Result Value Ref Range    Report       [facility] Emergency Dept.    Test Date:  2019-10-13  Pt Name:    GINNA ROCHE              Department: [facility]  MRN:        [id number]                      [facility]  Gender:     Female                       Technician: [id number]  :        1960                   Requested By:RANDALL MOORE  Order #:    [id number]                    Reading MD: RANDALL MOORE. AMD    Measurements  Intervals                                Axis  Rate:       105                          P:          66  NY:         128                          QRS:        -76  QRSD:       95                           T:          21  QT:         351  QTc:        465    Interpretive Statements  Sinus  tachycardia  Probable left atrial enlargement  Left anterior fascicular block  Compared to ECG 04/19/2019 03:51:30  Left anterior fascicular block now present  Left-axis deviation no longer present    Electronically Signed On 10- 14:11:56 PDT by RANDALL MOORE. AMD     URINALYSIS    Collection Time: 10/13/19  1:15 PM   Result Value Ref Range    Color Yellow     Character Clear     Specific Gravity 1.015 <1.035    Ph 6.5 5.0 - 8.0    Glucose Negative Negative mg/dL    Ketones >=80 (A) Negative mg/dL    Protein Negative Negative mg/dL    Bilirubin Moderate (A) Negative    Nitrite Negative Negative    Leukocyte Esterase Negative Negative    Occult Blood Negative Negative    Micro Urine Req see below    URINE CULTURE(NEW)    Collection Time: 10/13/19  1:15 PM   Result Value Ref Range    Significant Indicator NEG     Source UR     Site -     Culture Result Mixed skin selwyn 10-50,000 cfu/mL    BLOOD CULTURE    Collection Time: 10/13/19  1:24 PM   Result Value Ref Range    Significant Indicator NEG     Source BLD     Site PERIPHERAL     Culture Result       No Growth  Note: Blood cultures are incubated for 5 days and  are monitored continuously.Positive blood cultures  are called to the RN and reported as soon as  they are identified.  Blood culture testing and Gram stain, if indicated, are  performed at [facility], [address] [address].  Positive blood cultures are  sent to [facility], [address] [place] [address], for organism identification and  susceptibility testing.     CBC WITH DIFFERENTIAL    Collection Time: 10/13/19  1:34 PM   Result Value Ref Range    WBC 19.0 (H) 4.8 - 10.8 K/uL    RBC 5.75 (H) 4.20 - 5.40 M/uL    Hemoglobin 17.1 (H) 12.0 - 16.0 g/dL    Hematocrit 50.7 (H) 37.0 - 47.0 %    MCV 88.2 81.4 - 97.8 fL    MCH 29.7 27.0 - 33.0 pg    MCHC 33.7 33.6 - 35.0 g/dL    RDW 47.3 35.9 - 50.0 fL    Platelet Count 264 164 - 446 K/uL    MPV  9.2 9.0 - 12.9 fL    Neutrophils-Polys 92.10 (H) 44.00 - 72.00 %    Lymphocytes 2.70 (L) 22.00 - 41.00 %    Monocytes 3.90 0.00 - 13.40 %    Eosinophils 0.30 0.00 - 6.90 %    Basophils 0.60 0.00 - 1.80 %    Immature Granulocytes 0.40 0.00 - 0.90 %    Nucleated RBC 0.00 /100 WBC    Neutrophils (Absolute) 17.49 (H) 2.00 - 7.15 K/uL    Lymphs (Absolute) 0.52 (L) 1.00 - 4.80 K/uL    Monos (Absolute) 0.75 0.00 - 0.85 K/uL    Eos (Absolute) 0.06 0.00 - 0.51 K/uL    Baso (Absolute) 0.12 0.00 - 0.12 K/uL    Immature Granulocytes (abs) 0.08 0.00 - 0.11 K/uL    NRBC (Absolute) 0.00 K/uL   COMP METABOLIC PANEL    Collection Time: 10/13/19  1:34 PM   Result Value Ref Range    Sodium 140 135 - 145 mmol/L    Potassium 3.9 3.6 - 5.5 mmol/L    Chloride 105 96 - 112 mmol/L    Co2 20 20 - 33 mmol/L    Anion Gap 15.0 (H) 0.0 - 11.9    Glucose 116 (H) 65 - 99 mg/dL    Bun 11 8 - 22 mg/dL    Creatinine 0.59 0.50 - 1.40 mg/dL    Calcium 9.3 8.4 - 10.2 mg/dL    AST(SGOT) 20 12 - 45 U/L    ALT(SGPT) 18 2 - 50 U/L    Alkaline Phosphatase 66 30 - 99 U/L    Total Bilirubin 0.7 0.1 - 1.5 mg/dL    Albumin 3.5 3.2 - 4.9 g/dL    Total Protein 8.4 (H) 6.0 - 8.2 g/dL    Globulin 4.9 (H) 1.9 - 3.5 g/dL    A-G Ratio 0.7 g/dL   BLOOD CULTURE    Collection Time: 10/13/19  1:34 PM   Result Value Ref Range    Significant Indicator NEG     Source BLD     Site PERIPHERAL     Culture Result       No Growth  Note: Blood cultures are incubated for 5 days and  are monitored continuously.Positive blood cultures  are called to the RN and reported as soon as  they are identified.  Blood culture testing and Gram stain, if indicated, are  performed at [facility], [address] [place] [address].  Positive blood cultures are  sent to [facility], [address] [place] [address], for organism identification and  susceptibility testing.     TROPONIN    Collection Time: 10/13/19  1:34 PM   Result Value Ref Range     Troponin T 7 6 - 19 ng/L   proBrain Natriuretic Peptide, NT    Collection Time: 10/13/19  1:34 PM   Result Value Ref Range    NT-proBNP 121 0 - 125 pg/mL   D-DIMER    Collection Time: 10/13/19  1:34 PM   Result Value Ref Range    D-Dimer Screen 0.75 (H) 0.00 - 0.50 ug/mL (FEU)   LACTIC ACID    Collection Time: 10/13/19  1:34 PM   Result Value Ref Range    Lactic Acid 1.6 0.5 - 2.0 mmol/L   ESTIMATED GFR    Collection Time: 10/13/19  1:34 PM   Result Value Ref Range    GFR If African American >60 >60 mL/min/1.73 m 2    GFR If Non African American >60 >60 mL/min/1.73 m 2   Prothrombin time (INR)    Collection Time: 10/13/19  1:34 PM   Result Value Ref Range    PT 12.6 12.0 - 14.6 sec    INR 0.93 0.87 - 1.13   Procalcitonin    Collection Time: 10/13/19  1:34 PM   Result Value Ref Range    Procalcitonin 0.21 <0.25 ng/mL   Influenza A/B By PCR (Adult - Flu Only)    Collection Time: 10/13/19  1:51 PM   Result Value Ref Range    Influenza virus A RNA Negative Negative    Influenza virus B, PCR Negative Negative   LACTIC ACID    Collection Time: 10/13/19  5:45 PM   Result Value Ref Range    Lactic Acid 2.3 (H) 0.5 - 2.0 mmol/L   LACTIC ACID    Collection Time: 10/13/19 10:16 PM   Result Value Ref Range    Lactic Acid 1.5 0.5 - 2.0 mmol/L   Lactic Acid Every four hours after STAT order    Collection Time: 10/14/19  2:20 AM   Result Value Ref Range    Lactic Acid 2.8 (H) 0.5 - 2.0 mmol/L   CBC WITH DIFFERENTIAL    Collection Time: 10/14/19  6:20 AM   Result Value Ref Range    WBC 12.1 (H) 4.8 - 10.8 K/uL    RBC 4.95 4.20 - 5.40 M/uL    Hemoglobin 14.5 12.0 - 16.0 g/dL    Hematocrit 44.8 37.0 - 47.0 %    MCV 90.5 81.4 - 97.8 fL    MCH 29.3 27.0 - 33.0 pg    MCHC 32.4 (L) 33.6 - 35.0 g/dL    RDW 49.0 35.9 - 50.0 fL    Platelet Count 224 164 - 446 K/uL    MPV 9.2 9.0 - 12.9 fL    Neutrophils-Polys 95.10 (H) 44.00 - 72.00 %    Lymphocytes 2.70 (L) 22.00 - 41.00 %    Monocytes 1.70 0.00 - 13.40 %    Eosinophils 0.00 0.00 - 6.90 %     Basophils 0.20 0.00 - 1.80 %    Immature Granulocytes 0.30 0.00 - 0.90 %    Nucleated RBC 0.00 /100 WBC    Neutrophils (Absolute) 11.46 (H) 2.00 - 7.15 K/uL    Lymphs (Absolute) 0.32 (L) 1.00 - 4.80 K/uL    Monos (Absolute) 0.20 0.00 - 0.85 K/uL    Eos (Absolute) 0.00 0.00 - 0.51 K/uL    Baso (Absolute) 0.03 0.00 - 0.12 K/uL    Immature Granulocytes (abs) 0.04 0.00 - 0.11 K/uL    NRBC (Absolute) 0.00 K/uL   Renal Function Panel    Collection Time: 10/14/19  6:20 AM   Result Value Ref Range    Sodium 140 135 - 145 mmol/L    Potassium 4.1 3.6 - 5.5 mmol/L    Chloride 105 96 - 112 mmol/L    Co2 22 20 - 33 mmol/L    Glucose 187 (H) 65 - 99 mg/dL    Creatinine 0.60 0.50 - 1.40 mg/dL    Bun 9 8 - 22 mg/dL    Calcium 8.9 8.4 - 10.2 mg/dL    Phosphorus 2.8 2.5 - 4.5 mg/dL    Albumin 3.1 (L) 3.2 - 4.9 g/dL   Lipid Profile    Collection Time: 10/14/19  6:20 AM   Result Value Ref Range    Cholesterol,Tot 157 100 - 199 mg/dL    Triglycerides 60 0 - 149 mg/dL    HDL 80 >=40 mg/dL    LDL 65 <100 mg/dL   ESTIMATED GFR    Collection Time: 10/14/19  6:20 AM   Result Value Ref Range    GFR If African American >60 >60 mL/min/1.73 m 2    GFR If Non African American >60 >60 mL/min/1.73 m 2   LACTIC ACID    Collection Time: 10/14/19  6:42 AM   Result Value Ref Range    Lactic Acid 2.6 (H) 0.5 - 2.0 mmol/L   LACTIC ACID    Collection Time: 10/14/19 10:28 AM   Result Value Ref Range    Lactic Acid 2.6 (H) 0.5 - 2.0 mmol/L       Imaging  DX-CHEST-PORTABLE (1 VIEW)   Final Result         No acute cardiac or pulmonary abnormality is identified.          Assessment/Plan  Interstitial lung disease (HCC)- (present on admission)  Assessment & Plan  Unclear etiology  HRCT scan showed improvement, but cont to show GG opacities   serology for ILD was negative   Pts symptoms of SOB and cough are intermittent   She does have persistent bibasilar crackles   Chronic microaspiration with subsequent pneumonia/ bronchiolitis is a possibility   Agree  with abs because patient was febrile and had leukocytosis and left sift   Will try to do bronchoscopy with BAL to better assess for active infection. If BAL was negative for infection   Pt may be started on a course of steroids since this was tried before and pt improved            Thank you very much for the consultation. We will cont to follow     Discussed patient condition and risk of morbidity and/or mortality with Hospitalist and Patient.

## 2019-10-15 NOTE — OR NURSING
1349 Pt to PACU from OR via gurney, respirations spontaneous and non-labored via 6L mask. Pt arousable on calling with no complaints of pain or nausea.  1354 Report to MISSAEL Crane.

## 2019-10-15 NOTE — FLOWSHEET NOTE
10/15/19 1501   Interdisciplinary Plan of Care-Goals (Indications)   Bronchodilator Indications History / Diagnosis   Interdisciplinary Plan of Care-Outcomes    Bronchodilator Outcome Patient at Stable Baseline   RT Assessment of Delivered Medications   Evaluation of Medication Delivery Daily Yes-- Pt /Family has been Instructed in use of Respiratory Medications and Adverse Reactions   SVN Group   #SVN Performed Yes   Given By: Mouthpiece   Respiratory WDL   Respiratory (WDL) X   Chest Exam   Respiration 20   Heart Rate (Monitored) 68   Breath Sounds   Pre/Post Intervention Pre Intervention Assessment   RUL Breath Sounds Fine Crackles;Diminished   RML Breath Sounds Fine Crackles;Diminished   RLL Breath Sounds Diminished;Fine Crackles   BECCA Breath Sounds Fine Crackles;Diminished   LLL Breath Sounds Diminished;Fine Crackles   Oxygen   Pulse Oximetry 94 %   O2 (LPM) 4   O2 Daily Delivery Respiratory  Silicone Nasal Cannula

## 2019-10-15 NOTE — OR NURSING
Patient allergies and NPO status verified. Patient verbalizes understanding of pain scale, expected course of stay and plan of care. Surgical site verified with patient. IV assessed for patency. Pt awaiting procedure.

## 2019-10-15 NOTE — PROGRESS NOTES
Telemetry Shift Summary    Rhythm SR/ST  HR Range   Ectopy rPVC  Measurements 0.14/0.08/0.36        Normal Values  Rhythm SR  HR Range    Measurements 0.12-0.20 / 0.06-0.10  / 0.30-0.52

## 2019-10-15 NOTE — FACE TO FACE
"Face to Face Note  -  Durable Medical Equipment    Austin Bass M.D. - NPI: [id number]  I certify that this patient is under my care and that they had a durable medical equipment(DME)face to face encounter by myself that meets the physician DME face-to-face encounter requirements with this patient on:    Date of encounter:   Patient:                    MRN:                       YOB: 2019  Lillian Addison  [id number]  1960     The encounter with the patient was in whole, or in part, for the following medical condition, which is the primary reason for durable medical equipment:  Other - ILD    I certify that, based on my findings, the following durable medical equipment is medically necessary:  Oxygen.    HOME O2 Saturation Measurements:(Values must be present for Home Oxygen orders)  Room air sat at rest: 90  Room air sat with amb: 88  With liters of O2: 3, O2 sat at rest with O2: 94  With Liters of O2: 4, O2 sat with amb with O2 : 97  Is the patient mobile?: Yes    My Clinical findings support the need for the above equipment due to:  Hypoxia    Supporting Symptoms: The patient requires supplemental oxygen, as the following interventions have been tried with limited or no improvement: \"Bronchodilators and/or steroid inhalers    "

## 2019-10-15 NOTE — FLOWSHEET NOTE
10/14/19 2008   Events/Summary/Plan   Events/Summary/Plan SVN Flutter MDI   Interdisciplinary Plan of Care-Goals (Indications)   Bronchodilator Indications History / Diagnosis   Interdisciplinary Plan of Care-Outcomes    Bronchodilator Outcome Patient at Stable Baseline;Improved Patient Appearance with Decreased use of Accessory Muscles   Education   Education Yes - Pt. / Family has been Instructed in use of Respiratory Equipment;Yes - Pt. / Family has been Instructed in use of Respiratory Medications and Adverse Reactions   RT Assessment of Delivered Medications   Evaluation of Medication Delivery Daily Yes-- Pt /Family has been Instructed in use of Respiratory Medications and Adverse Reactions   SVN Group   #SVN Performed Yes   Given By: Mouthpiece   MDI/DPI Group   #MDI/DPI Given MDI/DPI x 1   PEP/CPT Group   PEP/CPT/Airway Clearance Therapy Yes   #PEP/CPT (Manual) Subsequent Subsequent   PEP/CPT Method Flutter Valve   Respiratory WDL   Respiratory (WDL) X   Chest Exam   Respiration 18   Pulse 79   Breath Sounds   RUL Breath Sounds Clear   RML Breath Sounds Clear   RLL Breath Sounds Coarse Crackles   BECCA Breath Sounds Coarse Crackles   LLL Breath Sounds Coarse Crackles   Oximetry   #Pulse Oximetry (Single Determination) Yes   Oxygen   Pulse Oximetry 93 %   O2 (LPM) 3   O2 Daily Delivery Respiratory  Silicone Nasal Cannula

## 2019-10-15 NOTE — PROGRESS NOTES
Telemetry Shift Summary    Rhythm SR, SB  HR Range 54-84  Ectopy RPVC  Measurements 0.16/0.08/0.40        Normal Values  Rhythm SR  HR Range    Measurements 0.12-0.20 / 0.06-0.10  / 0.30-0.52

## 2019-10-15 NOTE — ANESTHESIA POSTPROCEDURE EVALUATION
Patient: Lillian Addison    Procedure Summary     Date:  10/15/19 Room / Location:   PROCEDURE ROOM / SURGERY [facility]    Anesthesia Start:  1322 Anesthesia Stop:  1352    Procedure:  BRONCHOSCOPY Diagnosis:       Shortness of breath      (shortness of breath)    Surgeon:  Rosa Elena Richard M.D. Responsible Provider:  Zachary Lam M.D.    Anesthesia Type:  general ASA Status:  4          Final Anesthesia Type: general  Last vitals  BP   Blood Pressure: (P) 128/63    Temp   (P) 36.6 °C (97.9 °F)    Pulse   Pulse: 89, Heart Rate (Monitored): (P) 98   Resp   (P) 18    SpO2   (P) 98 %      Anesthesia Post Evaluation    Patient location during evaluation: PACU  Patient participation: complete - patient participated  Level of consciousness: awake  Pain score: 0    Airway patency: patent  Anesthetic complications: no  Cardiovascular status: hemodynamically stable  Respiratory status: acceptable  Hydration status: euvolemic    PONV: none           Nurse Pain Score: 0 (NPRS)

## 2019-10-16 ENCOUNTER — OFFICE VISIT (OUTPATIENT)
Dept: MEDICAL GROUP | Facility: MEDICAL CENTER | Age: 59
End: 2019-10-16
Payer: COMMERCIAL

## 2019-10-16 DIAGNOSIS — Z09 HOSPITAL DISCHARGE FOLLOW-UP: ICD-10-CM

## 2019-10-16 LAB
RHODAMINE-AURAMINE STN SPEC: NORMAL
RHODAMINE-AURAMINE STN SPEC: NORMAL
SIGNIFICANT IND 70042: NORMAL
SIGNIFICANT IND 70042: NORMAL
SITE SITE: NORMAL
SITE SITE: NORMAL
SOURCE SOURCE: NORMAL
SOURCE SOURCE: NORMAL

## 2019-10-16 PROCEDURE — 99213 OFFICE O/P EST LOW 20 MIN: CPT | Performed by: NURSE PRACTITIONER

## 2019-10-16 NOTE — PROGRESS NOTES
Pt verbalized understanding of discharge instructions.  Pt ate a late lunch and drank with no difficulties.  Iv site removed.  All belongings brought down with patient and  to personal vehicle.  Pt  brought in home o2 for patient to get home with, left on 3L o2 NC.  Pt refused wheelchair or hospital escort.

## 2019-10-16 NOTE — PROGRESS NOTES
Telemetry Shift Summary    Rhythm SR  HR Range 68-93  Ectopy rPVC  Measurements 0.14/0.10/0.38        Normal Values  Rhythm SR  HR Range    Measurements 0.12-0.20 / 0.06-0.10  / 0.30-0.52

## 2019-10-16 NOTE — DISCHARGE INSTRUCTIONS
Discharge Instructions per Austin Bass M.D.        DIET: regular    ACTIVITY: as tolerated    DIAGNOSIS: pulmonary fibrosis    Return to ER if symptoms worsen    Discharge Instructions    Discharged to home by car with relative. Discharged via wheelchair, hospital escort: Yes.  Special equipment needed: Not Applicable    Be sure to schedule a follow-up appointment with your primary care doctor or any specialists as instructed.     Discharge Plan:   Diet Plan: Discussed  Activity Level: Discussed  Confirmed Follow up Appointment: Appointment Scheduled  Confirmed Symptoms Management: Discussed  Medication Reconciliation Updated: Yes  Influenza Vaccine Indication: Patient Refuses    I understand that a diet low in cholesterol, fat, and sodium is recommended for good health. Unless I have been given specific instructions below for another diet, I accept this instruction as my diet prescription.   Other diet: Regular    Special Instructions: None    · Is patient discharged on Warfarin / Coumadin?   No     Depression / Suicide Risk    As you are discharged from this [facility] facility, it is important to learn how to keep safe from harming yourself.    Recognize the warning signs:  · Abrupt changes in personality, positive or negative- including increase in energy   · Giving away possessions  · Change in eating patterns- significant weight changes-  positive or negative  · Change in sleeping patterns- unable to sleep or sleeping all the time   · Unwillingness or inability to communicate  · Depression  · Unusual sadness, discouragement and loneliness  · Talk of wanting to die  · Neglect of personal appearance   · Rebelliousness- reckless behavior  · Withdrawal from people/activities they love  · Confusion- inability to concentrate     If you or a loved one observes any of these behaviors or has concerns about self-harm, here's what you can do:  · Talk about it- your feelings and reasons for harming  yourself  · Remove any means that you might use to hurt yourself (examples: pills, rope, extension cords, firearm)  · Get professional help from the community (Mental Health, Substance Abuse, psychological counseling)  · Do not be alone:Call your Safe Contact- someone whom you trust who will be there for you.  · Call your local CRISIS HOTLINE 020-5762 or 287-995-8393  · Call your local Children's Mobile Crisis Response Team [place] (226) 508-0549 or www.[employer]  · Call the toll free National Suicide Prevention Hotlines   · National Suicide Prevention Lifeline 104-259-GSWJ (7469)  · National Hope Line Network 800-SUICIDE (190-8995)

## 2019-10-17 ENCOUNTER — NON-PROVIDER VISIT (OUTPATIENT)
Dept: PULMONOLOGY | Facility: HOSPICE | Age: 59
End: 2019-10-17
Attending: INTERNAL MEDICINE
Payer: COMMERCIAL

## 2019-10-17 ENCOUNTER — OFFICE VISIT (OUTPATIENT)
Dept: PULMONOLOGY | Facility: HOSPICE | Age: 59
End: 2019-10-17
Payer: COMMERCIAL

## 2019-10-17 VITALS
HEART RATE: 82 BPM | TEMPERATURE: 98.7 F | SYSTOLIC BLOOD PRESSURE: 120 MMHG | BODY MASS INDEX: 24.77 KG/M2 | HEIGHT: 70 IN | WEIGHT: 173 LBS | DIASTOLIC BLOOD PRESSURE: 74 MMHG | OXYGEN SATURATION: 90 %

## 2019-10-17 VITALS
HEART RATE: 96 BPM | BODY MASS INDEX: 24.88 KG/M2 | DIASTOLIC BLOOD PRESSURE: 76 MMHG | RESPIRATION RATE: 16 BRPM | OXYGEN SATURATION: 96 % | HEIGHT: 69 IN | WEIGHT: 168 LBS | SYSTOLIC BLOOD PRESSURE: 124 MMHG

## 2019-10-17 VITALS — BODY MASS INDEX: 24.88 KG/M2 | WEIGHT: 168 LBS | HEIGHT: 69 IN

## 2019-10-17 DIAGNOSIS — J96.01 ACUTE RESPIRATORY FAILURE WITH HYPOXIA (HCC): ICD-10-CM

## 2019-10-17 DIAGNOSIS — J84.9 ILD (INTERSTITIAL LUNG DISEASE) (HCC): ICD-10-CM

## 2019-10-17 DIAGNOSIS — Z98.84 HISTORY OF ROUX-EN-Y GASTRIC BYPASS: ICD-10-CM

## 2019-10-17 LAB
BACTERIA SPEC RESP CULT: NORMAL
BACTERIA SPEC RESP CULT: NORMAL
GRAM STN SPEC: NORMAL
GRAM STN SPEC: NORMAL
SIGNIFICANT IND 70042: NORMAL
SIGNIFICANT IND 70042: NORMAL
SITE SITE: NORMAL
SITE SITE: NORMAL
SOURCE SOURCE: NORMAL
SOURCE SOURCE: NORMAL

## 2019-10-17 PROCEDURE — 94729 DIFFUSING CAPACITY: CPT | Performed by: INTERNAL MEDICINE

## 2019-10-17 PROCEDURE — 94726 PLETHYSMOGRAPHY LUNG VOLUMES: CPT | Performed by: INTERNAL MEDICINE

## 2019-10-17 PROCEDURE — 94060 EVALUATION OF WHEEZING: CPT | Performed by: INTERNAL MEDICINE

## 2019-10-17 PROCEDURE — 99214 OFFICE O/P EST MOD 30 MIN: CPT | Performed by: INTERNAL MEDICINE

## 2019-10-17 PROCEDURE — 94761 N-INVAS EAR/PLS OXIMETRY MLT: CPT | Performed by: INTERNAL MEDICINE

## 2019-10-17 RX ORDER — PREDNISONE 10 MG/1
TABLET ORAL
Qty: 75 TAB | Refills: 0 | Status: SHIPPED | OUTPATIENT
Start: 2019-10-17 | End: 2020-01-13

## 2019-10-17 ASSESSMENT — ENCOUNTER SYMPTOMS
STRIDOR: 0
WEAKNESS: 0
ABDOMINAL PAIN: 0
WEIGHT LOSS: 0
WHEEZING: 1
HEMOPTYSIS: 0
BACK PAIN: 0
TREMORS: 0
COUGH: 0
NAUSEA: 0
DIZZINESS: 0
BLURRED VISION: 0
FEVER: 0
SINUS PAIN: 0
CONSTIPATION: 0
DOUBLE VISION: 0
DIAPHORESIS: 0
DEPRESSION: 0
CLAUDICATION: 0
MYALGIAS: 0
VOMITING: 0
FALLS: 0
SPUTUM PRODUCTION: 0
SORE THROAT: 0
HEARTBURN: 0
PND: 0
NECK PAIN: 0
SPEECH CHANGE: 0
CHILLS: 0
SHORTNESS OF BREATH: 1
HEADACHES: 0
EYE REDNESS: 0
ORTHOPNEA: 0
EYE PAIN: 0
FOCAL WEAKNESS: 0
PHOTOPHOBIA: 0
DIARRHEA: 0
EYE DISCHARGE: 0
PALPITATIONS: 0

## 2019-10-17 ASSESSMENT — PULMONARY FUNCTION TESTS
FVC_PREDICTED: 3.96
FEV1/FVC: 86.99
FVC_LLN: 3.31
FEV1/FVC: 87
FEV1/FVC_PERCENT_PREDICTED: 112
FEV1_PERCENT_CHANGE: 1
FEV1/FVC: 87
FEV1_LLN: 2.56
FVC: 2.64
FEV1/FVC_PERCENT_PREDICTED: 111
FEV1: 2.3
FVC_PERCENT_PREDICTED: 66
FEV1/FVC_PREDICTED: 78
FEV1_PERCENT_PREDICTED: 74
FEV1/FVC: 87
FEV1: 2.34
FEV1/FVC_PERCENT_PREDICTED: 113
FEV1/FVC_PERCENT_PREDICTED: 78
FEV1_PREDICTED: 3.07
FEV1_PERCENT_PREDICTED: 76
FEV1/FVC_PERCENT_CHANGE: 100
FVC: 2.69
FEV1_PERCENT_CHANGE: 1
FEV1/FVC_PERCENT_CHANGE: 0
FEV1/FVC_PERCENT_LLN: 65
FEV1/FVC_PERCENT_PREDICTED: 111
FVC_PERCENT_PREDICTED: 67

## 2019-10-17 NOTE — ASSESSMENT & PLAN NOTE
Admitted from 10/13-10/15 for aspiration pneumonitis/PNA due to sol en Y surgery. Treated with IV antibiotics, recovered well. Discharged on 2L oxygen which she was not on before.     Had bronchoscopy and Chest CT yesterday. Concern for possible ILD vs aspiration pneumonitis. Results pending. CT shows pneumonitis.     Tomorrow has appointment with pulmonology and for PFT.     Has apt with Dr. Castellanos, same office as Dr. Ganser, on Tuesday. Plan for possible revision of sol en Y.     Feeling tired. Breathing has been good. Doing her IS at home, getting 3401-3790 ml.

## 2019-10-17 NOTE — PROCEDURES
Procedure:  Preanesthesia assessment:   History of physical has been performed. Patient medications and allergies have been reviewed. The risks and the benefits of the procedure and  the sedation options and risks were discussed with the patient. All questions were answered and informed consent was obtained. Patient identification and proposed procedure were verified prior to the procedure by the physician, the nurse and the anesthesiologist in the preprocedure area.  Normal mental status examination: Normal oropharyngeal exam. Respiratory examination: Clear to auscultation. Cardiovascular examination: Normal. ASA grade assessment: 2- the patient has mild systemic disease. After reviewing the risks and benefits, the patient was deemed in satisfactory conditions to undergo the procedure.     Anesthesia:  The patient received general anesthesia by the anesthesiologist.  Fiberoptic bronchoscope was introduced through the airways through 8.5 endotracheal tube.     Findings/ Intervention:   The procedure was accomplished without difficulty. The patient tolerated the procedure well.  The trachea was normal caliber. The kay was sharp. The tracheal bronchial tree of the right lung was examined to at least the first subsegmental level. Bronchial mucosa and anatomy in the right lung was normal, no endobronchial lesions  The tracheal bronchial tree of the left lung was examined to at least subsegmental level. Bronchial mucosa and anatomy in the left lung were normal.    No endobronchial lesions     There was no terminal secretions bilaterally    The bronchoscope was then wedged to the lateral segment of the right middle lobe 100 mL of sterile saline instilled through the working channel of the bronchoscope about 25 mL of cloudy fluid was drawn back    After that the bronchoscope was switched in the anterior segment of the left lower lobe.  100 mL of sterile saline instilled and about 15 mL of cloudy liquids drawn  back    Both the specimens were sent for bacterial, fungal and AFB cultures.  They were also sent for white blood cell differential      Impression:  Groundglass opacity bilaterally associated with shortness of breath and cough     Recommendations:  Follow-up  BAL results        Complications:  No immediate complications were observed. Minimal blood loss.

## 2019-10-17 NOTE — PROCEDURES
Technician: RUBINA Benitez    Technician Comment:  Good patient effort & cooperation.  The results of this test meet the ATS/ERS standards for acceptability & reproducibility.  Test was performed on the [employer] Body Plethysmograph-Elite DX system.  Predicted values were [facility] for spirometry, [facility] for DLCO, ITS for Lung Volumes.  The DLCO was uncorrected for Hgb.  A bronchodilator of Ventolin HFA -2puffs via spacer administered.  DLCO performed during dilation period.    Interpretation:  1.  Baseline spirometry shows airflow restriction with reduction in FEV1 at 74% predicted and FVC at 66% of predicted and an FEV1/FVC ratio of 87.  2.  There is no significant bronchodilator response.  3.  Total lung capacity is within normal limits at 93%.  There is mild air trapping with elevated RV/TLC at 126% predicted.  4.  DLCO is within normal limits at 94% of predicted.  Pulmonary function test is suggestive of mild restrictive process with preserved DLCO.  This may be related to body habitus.

## 2019-10-17 NOTE — PROGRESS NOTES
Chief Complaint   Patient presents with   • Follow-Up     acute respiratory failure with hypoxia last seen 9/18/19    • Results     PFT 60 10/17/19, hrct 9/27/19         HPI: This patient is a 59 y.o. female whom is followed in our clinic for acute hypoxic respiratory failure last seen by me on 9/18/19.  The patient's past medical history is significant for hypertension, prediabetes currently controlled with diet, history of obesity treated with Kennedy-en-Y gastric bypass and more recently acute hypoxic respiratory failure in the setting of presumed community-acquired pneumonia.  The patient is a former smoker with a roughly 20-pack-year history having quit in 1994.  The patient was hospitalized in April of this year at [facility] for acute hypoxic respiratory failure after a trip to [place] with family where several other family members became ill although not the same severity.  She did require ICU admission with high flow nasal cannula oxygen due to severe hypoxia.  A CT chest obtained on admission showed no pulmonary embolism but did show extensive groundglass opacities bilaterally in both upper and lower lung fields but predominantly upper lobes.  An echocardiogram showed normal left ventricular ejection fraction of 65%.  A procalcitonin was elevated at 4.14 and she was initiated on antibiotic treatment.  She did have a mildly elevated BNP in the range of 300 and underwent diuresis during her hospital stay.  Following diuresis the patient had bronchoscopy on April 23 with a BAL showing no organisms, fluid was described as red and bloody with rare WBCs, 100% polys.  She was treated with Rocephin and azithromycin inpt and changed to oral Augmentin prior to discharge home.  She was also treated with prednisone and given 40 mg to take 5 days upon discharge.  The patient did not require oxygen at the time of discharge.  I saw her in f/u and she was using flovent twice daily as well as as needed EDER via nebulizer.  She had been told that she had an abnormal lung exam in the past and was given rescue inhaler although outside of a PNA in 11/2018 and her hospital stay in April, she denies chronic respiratory sxs. PFTs show bronchodilator responsiveness but otherwise normal from Jan 2019.  We repeated a CT chest in June which showed near resolution of her GGO despite a persistently abnormal exam.  I saw her in June and she was no longer requiring supplemental O2 with improved CT as per above so we planned to continue flovent and EDER.  She began having increased cough and chest congestion in early September and had stopped flovent so  I recommended she resume flovent and start flonase as well as OTC mucinex for upper airway congestion.  Pt sxs continued despite therapy and her SaO2 began to drop and we saw her for sick visit 9/18 when she had markedly abnormal and SaO2 is 86% on RA at rest.  I admitted her to the hospital where CT was not obtained due to malfunction of machine and the presumptive dx was aspiration pna in pt with prior Kennedy-en-Y.  She was d/c'd with supplemental O2, abx but NO steroids this time and recommended to f/u with bariatric surgery for possible revision.  She had f/u HRCT chest which showed some areas of improvement and some new small areas of GGO. I requested earlier f/u with me however pt required repeat admission for worsening SOB on 10/13 at which pt a CTA was done and although different protocol, appears to have increasing GGO mainly peripheral in location since 9/27 but not as severe as CT from April.  She underwent bronchoscopy again which showed no e/o infx. Cells were mainly lymphocytes this time, no transbronchial bx performed.  A few basic CTD serologies were sent and negative.  She was tx with prednisone and abx and discharged.  Her O2 is improved today with brief desaturation to 88% on RA during multi-ox.  She has plans to see bariatric surgery in the near future.  She is currently on  prednisone 40 mg daily.  Repeat PFTs today show mild restriction of airflows, normal TLC, mild air trapping and normal DLCO.    Past Medical History:   Diagnosis Date   • Asthma    • Diabetes (HCC)    • Hyperlipidemia    • Hypertension    • Thyroid disease        Social History     Socioeconomic History   • Marital status:      Spouse name: Not on file   • Number of children: Not on file   • Years of education: Not on file   • Highest education level: Not on file   Occupational History   • Not on file   Social Needs   • Financial resource strain: Not on file   • Food insecurity:     Worry: Not on file     Inability: Not on file   • Transportation needs:     Medical: Not on file     Non-medical: Not on file   Tobacco Use   • Smoking status: Former Smoker     Packs/day: 1.00     Years: 20.00     Pack years: 20.00     Types: Cigarettes     Last attempt to quit: 1994     Years since quittin.7   • Smokeless tobacco: Never Used   Substance and Sexual Activity   • Alcohol use: Yes     Alcohol/week: 0.6 - 1.2 oz     Types: 1 - 2 Shots of liquor per week     Comment: 1-2x per week, 1-2 drinks   • Drug use: Not Currently     Types: Marijuana, Oral     Comment: edibled   • Sexual activity: Not on file   Lifestyle   • Physical activity:     Days per week: Not on file     Minutes per session: Not on file   • Stress: Not on file   Relationships   • Social connections:     Talks on phone: Not on file     Gets together: Not on file     Attends Anabaptism service: Not on file     Active member of club or organization: Not on file     Attends meetings of clubs or organizations: Not on file     Relationship status: Not on file   • Intimate partner violence:     Fear of current or ex partner: Not on file     Emotionally abused: Not on file     Physically abused: Not on file     Forced sexual activity: Not on file   Other Topics Concern   • Not on file   Social History Narrative   • Not on file       Family History    Problem Relation Age of Onset   • Cancer Mother 55        breast   • Heart Disease Mother         MI   • Cancer Maternal Grandmother         breast   • Cancer Sister         breast   • Diabetes Father    • Cancer Father         SCC lung       Current Outpatient Medications on File Prior to Visit   Medication Sig Dispense Refill   • Probiotic Product (PROBIOTIC DAILY PO) Take  by mouth.     • doxycycline monohydrate (ADOXA) 100 MG tablet Take 1 Tab by mouth every 12 hours for 7 days. 14 Tab 0   • predniSONE (DELTASONE) 20 MG Tab Take 2 Tabs by mouth every day for 20 days. Please do not stop. Please refill and discuss getting off this with pulmonary medicine. 40 Tab 3   • levothyroxine (SYNTHROID) 88 MCG Tab Take 88 mcg by mouth Every morning on an empty stomach.     • Dexlansoprazole (DEXILANT) 60 MG CAPSULE DELAYED RELEASE delayed-release capsule Take 60 mg by mouth every day.     • ibuprofen (MOTRIN) 200 MG Tab Take 400 mg by mouth every 6 hours as needed for Mild Pain.     • CALCIUM PO Take 1 Tab by mouth every day.     • IRON PO Take 1 Tab by mouth every evening.     • Ascorbic Acid (VITAMIN C PO) Take 1 Tab by mouth every evening.     • fluticasone (FLOVENT HFA) 110 MCG/ACT Aerosol Inhale 2 Puffs by mouth every 12 hours. 1 Inhaler 6   • albuterol 108 (90 Base) MCG/ACT Aero Soln inhalation aerosol Inhale 2 Puffs by mouth every 6 hours as needed for Shortness of Breath.     • Omega-3 Fatty Acids (FISH OIL) 1000 MG Cap capsule Take 1,000 mg by mouth every day.     • vitamin D (CHOLECALCIFEROL) 1000 UNIT Tab Take 1,000 Units by mouth every day.     • therapeutic multivitamin-minerals (THERAGRAN-M) Tab Take 1 Tab by mouth every day.     • magnesium gluconate (MAG-G) 500 MG tablet Take 500 mg by mouth every day.     • cyanocobalamin (VITAMIN B-12) 500 MCG Tab Take 500 mcg by mouth every day.     • lisinopril (PRINIVIL) 20 MG TABS Take 20 mg by mouth every day.       No current facility-administered medications on  "file prior to visit.        Codeine and Sulfa drugs      ROS:   Review of Systems   Constitutional: Negative for chills, diaphoresis, fever, malaise/fatigue and weight loss.   HENT: Negative for congestion, ear discharge, ear pain, hearing loss, nosebleeds, sinus pain, sore throat and tinnitus.    Eyes: Negative for blurred vision, double vision, photophobia, pain, discharge and redness.   Respiratory: Positive for shortness of breath and wheezing. Negative for cough, hemoptysis, sputum production and stridor.    Cardiovascular: Negative for chest pain, palpitations, orthopnea, claudication, leg swelling and PND.   Gastrointestinal: Negative for abdominal pain, constipation, diarrhea, heartburn, nausea and vomiting.   Genitourinary: Negative for dysuria and urgency.   Musculoskeletal: Negative for back pain, falls, joint pain, myalgias and neck pain.   Skin: Negative for itching and rash.   Neurological: Negative for dizziness, tremors, speech change, focal weakness, weakness and headaches.   Endo/Heme/Allergies: Negative for environmental allergies.   Psychiatric/Behavioral: Negative for depression.       /76 (BP Location: Left arm, Patient Position: Sitting, BP Cuff Size: Adult)   Pulse 96   Resp 16   Ht 1.753 m (5' 9\")   Wt 76.2 kg (168 lb)   SpO2 96%   Physical Exam   Constitutional: She is oriented to person, place, and time. She appears well-developed and well-nourished. No distress.   HENT:   Head: Normocephalic and atraumatic.   Right Ear: External ear normal.   Left Ear: External ear normal.   Mouth/Throat: Oropharynx is clear and moist. No oropharyngeal exudate.   Eyes: Pupils are equal, round, and reactive to light. Conjunctivae and EOM are normal. No scleral icterus.   Neck: Normal range of motion. Neck supple. No JVD present. No tracheal deviation present.   Cardiovascular: Normal rate, regular rhythm and normal heart sounds. Exam reveals no gallop and no friction rub.   No murmur " heard.  Pulmonary/Chest: Effort normal. No accessory muscle usage. No respiratory distress. She has no wheezes. She has no rales.   + inspiratory squeaks and crackles b/u upper and lower lobes   Abdominal: Soft. She exhibits no distension. There is no tenderness.   Musculoskeletal: Normal range of motion. She exhibits no edema, tenderness or deformity.   Lymphadenopathy:     She has no cervical adenopathy.   Neurological: She is alert and oriented to person, place, and time. No cranial nerve deficit. Gait normal.   Skin: Skin is warm and dry. No rash noted. No cyanosis. Nails show no clubbing.   Psychiatric: She has a normal mood and affect.       PFTs as reviewed by me personally:  As per HPI  Imaging as reviewed by me personally:  As per HPI    Assessment:  1. Acute respiratory failure with hypoxia (HCC)  COSMO-1 AB,IGG    CREATINE KINASE    ALDOLASE    predniSONE (DELTASONE) 10 MG Tab    Multiple Oximetry    REFERRAL TO OTHER   2. ILD (interstitial lung disease) ([facility])  REFERRAL TO OTHER   3. History of Kennedy-en-Y gastric bypass         Plan:  1.  This is recurrent and acute to subacute each time.  Differential diagnosis includes aspiration related to her gastric bypass however my suspicion for this is somewhat lower given that she has had an abnormal exam with inspiratory crackles and squeaks in both upper and lower lobes even when her CT was relatively normal and room air oxygenation was normal.  She appears to be responsive to steroids with resumption of symptoms following discontinuation.  I again reviewed potential exposures for possible hypersensitivity pneumonitis but for the most part we cannot come up with any obvious sources.  Her hypoxia and exam have seemed somewhat out of proportion to her CT findings other than her presentation in April.  I am concerned for underlying interstitial lung disease and possibly connective tissue disease.  Rheumatoid factor, CCP, CHELLE and Sjogren's serologies were sent during  her hospital stay and negative.  I will further expand the serology screening with labs looking for anti-synthetase syndrome.  We will plan to continue prolonged steroid taper at 40 mg for 2 weeks, 30 mg for 2 weeks, 20 mg for 2 weeks then 10 mg for 2 weeks.  We will see her back within a week of completing steroids for reevaluation and in the interim she will have evaluation by bariatric surgery.  I have also referred her to the interstitial lung disease clinic at [facility].  2.  See discussion above.  While aspiration or reflux related to her bariatric surgery is in the differential, she has never had a normal exam even when her CT and hypoxia improves.  I am furthering the work-up for connective tissue disease and referring her for evaluation at [facility] as per above.  3.  Patient is at risk for gastroesophageal reflux and resultant aspiration related to her surgery.  She does have an appointment with the bariatric surgery department to evaluate for Kennedy-en-Y reversal.  In the meantime we will continue work-up for other causes.  Return in about 8 weeks (around 12/12/2019) for respiratory failure, labs.

## 2019-10-17 NOTE — PROGRESS NOTES
Subjective:   Lillian Addison is a 59 y.o. female here today for hospital follow up:    Hospital discharge follow-up  Admitted from 10/13-10/15 for aspiration pneumonitis/PNA due to sol en Y surgery. Treated with IV antibiotics, recovered well. Discharged on 2L oxygen which she was not on before.     Had bronchoscopy and Chest CT yesterday. Concern for possible ILD vs aspiration pneumonitis. Results pending. CT shows pneumonitis.     Tomorrow has appointment with pulmonology and for PFT.     Has apt with Dr. Castellanos, same office as Dr. Ganser, on Tuesday. Plan for possible revision of sol en Y.     Feeling tired. Breathing has been good. Doing her IS at home, getting 0607-1819 ml.       Current medicines (including changes today)  Current Outpatient Medications   Medication Sig Dispense Refill   • doxycycline monohydrate (ADOXA) 100 MG tablet Take 1 Tab by mouth every 12 hours for 7 days. 14 Tab 0   • predniSONE (DELTASONE) 20 MG Tab Take 2 Tabs by mouth every day for 20 days. Please do not stop. Please refill and discuss getting off this with pulmonary medicine. 40 Tab 3   • levothyroxine (SYNTHROID) 88 MCG Tab Take 88 mcg by mouth Every morning on an empty stomach.     • Dexlansoprazole (DEXILANT) 60 MG CAPSULE DELAYED RELEASE delayed-release capsule Take 60 mg by mouth every day.     • ibuprofen (MOTRIN) 200 MG Tab Take 400 mg by mouth every 6 hours as needed for Mild Pain.     • CALCIUM PO Take 1 Tab by mouth every day.     • IRON PO Take 1 Tab by mouth every evening.     • Ascorbic Acid (VITAMIN C PO) Take 1 Tab by mouth every evening.     • fluticasone (FLOVENT HFA) 110 MCG/ACT Aerosol Inhale 2 Puffs by mouth every 12 hours. 1 Inhaler 6   • albuterol 108 (90 Base) MCG/ACT Aero Soln inhalation aerosol Inhale 2 Puffs by mouth every 6 hours as needed for Shortness of Breath.     • Omega-3 Fatty Acids (FISH OIL) 1000 MG Cap capsule Take 1,000 mg by mouth every day.     • vitamin D (CHOLECALCIFEROL) 1000 UNIT Tab  "Take 1,000 Units by mouth every day.     • therapeutic multivitamin-minerals (THERAGRAN-M) Tab Take 1 Tab by mouth every day.     • magnesium gluconate (MAG-G) 500 MG tablet Take 500 mg by mouth every day.     • cyanocobalamin (VITAMIN B-12) 500 MCG Tab Take 500 mcg by mouth every day.     • lisinopril (PRINIVIL) 20 MG TABS Take 20 mg by mouth every day.       No current facility-administered medications for this visit.      She  has a past medical history of Asthma, Diabetes (HCC), Hyperlipidemia, Hypertension, and Thyroid disease.    ROS   No chest pain, no shortness of breath, no abdominal pain  Positive ROS as per HPI.  All other systems reviewed and are negative.     Objective:     /74 (BP Location: Right arm, Patient Position: Sitting)   Pulse 82   Temp 37.1 °C (98.7 °F) (Temporal)   Ht 1.778 m (5' 10\")   Wt 78.5 kg (173 lb)   SpO2 90%  Body mass index is 24.82 kg/m².     Physical Exam:  Constitutional: Alert, no distress.  Skin: Warm, dry, good turgor, no rashes in visible areas.  Eye: Equal, round and reactive, conjunctiva clear, lids normal.  ENMT: Lips without lesions, good dentition, oropharynx clear.  Neck: Trachea midline, no masses, no thyromegaly. No cervical or supraclavicular lymphadenopathy  Respiratory: Unlabored respiratory effort, lungs clear to auscultation, no wheezes, no ronchi.  Cardiovascular: Normal S1, S2, no murmur, no edema.  Abdomen: Soft, non-tender, no masses, no hepatosplenomegaly.  Psych: Alert and oriented x3, normal affect and mood.      Assessment and Plan:   The following treatment plan was discussed    1. Hospital discharge follow-up  Stable  Continue oxygen 24/7 until cleared by pulmonology  Follow up with specialists to review CT and bronchoscopy results   Follow up with surgery to discuss revision of sol en Y      Followup: Return in about 3 months (around 1/16/2020).    I have placed the below orders and discussed them with an approved delegating provider. The " MA is performing the below orders under the direction of Dr. Franco

## 2019-10-17 NOTE — PROCEDURES
Multi-Ox Readings  Multi Ox #1 Room air   O2 sat % at rest 93   O2 sat % on exertion 88   O2 sat average on exertion 91   Multi Ox #2     O2 sat % at rest     O2 sat % on exertion     O2 sat average on exertion       Oxygen Use 2   Oxygen Frequency With exertion and nocturnal   Duration of need     Is the patient mobile within the home?     CPAP Use?     BIPAP Use?     Servo Titration

## 2019-10-18 LAB
BACTERIA BLD CULT: NORMAL
BACTERIA BLD CULT: NORMAL
SIGNIFICANT IND 70042: NORMAL
SIGNIFICANT IND 70042: NORMAL
SITE SITE: NORMAL
SITE SITE: NORMAL
SOURCE SOURCE: NORMAL
SOURCE SOURCE: NORMAL

## 2019-10-19 ENCOUNTER — HOSPITAL ENCOUNTER (OUTPATIENT)
Dept: LAB | Facility: MEDICAL CENTER | Age: 59
End: 2019-10-19
Attending: NURSE PRACTITIONER
Payer: COMMERCIAL

## 2019-10-19 ENCOUNTER — HOSPITAL ENCOUNTER (OUTPATIENT)
Dept: LAB | Facility: MEDICAL CENTER | Age: 59
End: 2019-10-19
Attending: INTERNAL MEDICINE
Payer: COMMERCIAL

## 2019-10-19 DIAGNOSIS — J96.01 ACUTE RESPIRATORY FAILURE WITH HYPOXIA (HCC): ICD-10-CM

## 2019-10-19 LAB
25(OH)D3 SERPL-MCNC: 43 NG/ML (ref 30–100)
ALBUMIN SERPL BCP-MCNC: 3.8 G/DL (ref 3.2–4.9)
ALBUMIN/GLOB SERPL: 1 G/DL
ALP SERPL-CCNC: 48 U/L (ref 30–99)
ALT SERPL-CCNC: 21 U/L (ref 2–50)
ANION GAP SERPL CALC-SCNC: 11 MMOL/L (ref 0–11.9)
AST SERPL-CCNC: 15 U/L (ref 12–45)
BASOPHILS # BLD AUTO: 0.5 % (ref 0–1.8)
BASOPHILS # BLD: 0.12 K/UL (ref 0–0.12)
BILIRUB SERPL-MCNC: 0.6 MG/DL (ref 0.1–1.5)
BUN SERPL-MCNC: 12 MG/DL (ref 8–22)
CALCIUM SERPL-MCNC: 9.7 MG/DL (ref 8.5–10.5)
CHLORIDE SERPL-SCNC: 105 MMOL/L (ref 96–112)
CHOLEST SERPL-MCNC: 219 MG/DL (ref 100–199)
CK SERPL-CCNC: 19 U/L (ref 0–154)
CO2 SERPL-SCNC: 28 MMOL/L (ref 20–33)
CREAT SERPL-MCNC: 0.68 MG/DL (ref 0.5–1.4)
EOSINOPHIL # BLD AUTO: 0.12 K/UL (ref 0–0.51)
EOSINOPHIL NFR BLD: 0.5 % (ref 0–6.9)
ERYTHROCYTE [DISTWIDTH] IN BLOOD BY AUTOMATED COUNT: 48.2 FL (ref 35.9–50)
FOLATE SERPL-MCNC: >22.4 NG/ML
GLOBULIN SER CALC-MCNC: 4 G/DL (ref 1.9–3.5)
GLUCOSE SERPL-MCNC: 78 MG/DL (ref 65–99)
HCT VFR BLD AUTO: 54.6 % (ref 37–47)
HDLC SERPL-MCNC: 72 MG/DL
HGB BLD-MCNC: 18 G/DL (ref 12–16)
IMM GRANULOCYTES # BLD AUTO: 0.13 K/UL (ref 0–0.11)
IMM GRANULOCYTES NFR BLD AUTO: 0.6 % (ref 0–0.9)
IRON SATN MFR SERPL: 36 % (ref 15–55)
IRON SERPL-MCNC: 115 UG/DL (ref 40–170)
LDLC SERPL CALC-MCNC: 125 MG/DL
LYMPHOCYTES # BLD AUTO: 1.26 K/UL (ref 1–4.8)
LYMPHOCYTES NFR BLD: 5.7 % (ref 22–41)
MCH RBC QN AUTO: 30.3 PG (ref 27–33)
MCHC RBC AUTO-ENTMCNC: 32.7 G/DL (ref 33.6–35)
MCV RBC AUTO: 92.4 FL (ref 81.4–97.8)
MONOCYTES # BLD AUTO: 0.78 K/UL (ref 0–0.85)
MONOCYTES NFR BLD AUTO: 3.5 % (ref 0–13.4)
NEUTROPHILS # BLD AUTO: 19.78 K/UL (ref 2–7.15)
NEUTROPHILS NFR BLD: 89.2 % (ref 44–72)
NRBC # BLD AUTO: 0 K/UL
NRBC BLD-RTO: 0 /100 WBC
PLATELET # BLD AUTO: 315 K/UL (ref 164–446)
PMV BLD AUTO: 9.8 FL (ref 9–12.9)
POTASSIUM SERPL-SCNC: 3.7 MMOL/L (ref 3.6–5.5)
PREALB SERPL-MCNC: 26 MG/DL (ref 18–38)
PROT SERPL-MCNC: 7.8 G/DL (ref 6–8.2)
RBC # BLD AUTO: 5.95 M/UL (ref 4.2–5.4)
SODIUM SERPL-SCNC: 144 MMOL/L (ref 135–145)
TIBC SERPL-MCNC: 316 UG/DL (ref 250–450)
TRIGL SERPL-MCNC: 109 MG/DL (ref 0–149)
VIT B12 SERPL-MCNC: >1500 PG/ML (ref 211–911)
WBC # BLD AUTO: 22.2 K/UL (ref 4.8–10.8)

## 2019-10-19 PROCEDURE — 36415 COLL VENOUS BLD VENIPUNCTURE: CPT

## 2019-10-19 PROCEDURE — 83540 ASSAY OF IRON: CPT

## 2019-10-19 PROCEDURE — 80061 LIPID PANEL: CPT

## 2019-10-19 PROCEDURE — 82085 ASSAY OF ALDOLASE: CPT

## 2019-10-19 PROCEDURE — 85025 COMPLETE CBC W/AUTO DIFF WBC: CPT

## 2019-10-19 PROCEDURE — 82607 VITAMIN B-12: CPT

## 2019-10-19 PROCEDURE — 84134 ASSAY OF PREALBUMIN: CPT

## 2019-10-19 PROCEDURE — 82306 VITAMIN D 25 HYDROXY: CPT

## 2019-10-19 PROCEDURE — 84425 ASSAY OF VITAMIN B-1: CPT

## 2019-10-19 PROCEDURE — 84207 ASSAY OF VITAMIN B-6: CPT

## 2019-10-19 PROCEDURE — 83550 IRON BINDING TEST: CPT

## 2019-10-19 PROCEDURE — 82746 ASSAY OF FOLIC ACID SERUM: CPT

## 2019-10-19 PROCEDURE — 86235 NUCLEAR ANTIGEN ANTIBODY: CPT

## 2019-10-19 PROCEDURE — 84591 ASSAY OF NOS VITAMIN: CPT

## 2019-10-19 PROCEDURE — 80053 COMPREHEN METABOLIC PANEL: CPT

## 2019-10-19 PROCEDURE — 82550 ASSAY OF CK (CPK): CPT

## 2019-10-21 LAB
ALDOLASE SERPL-CCNC: 4.7 U/L (ref 1.5–8.1)
ENA JO1 AB TITR SER: 0 AU/ML (ref 0–40)

## 2019-10-22 LAB — VIT B6 SERPL-MCNC: 54.3 NMOL/L (ref 20–125)

## 2019-10-23 LAB — VIT B1 BLD-MCNC: 207 NMOL/L (ref 70–180)

## 2019-10-24 LAB — NIACIN SERPL-MCNC: 4.28 UG/ML (ref 0.5–8.45)

## 2019-10-29 NOTE — DOCUMENTATION QUERY
"                                                                         [facility]                                                                       Query Response Note      PATIENT:               GINNA ROCHE  ACCT #:                  [id number]  MRN:                     [id number]  :                      1960  ADMIT DATE:       10/13/2019 12:48 PM  DISCH DATE:        10/15/2019 6:15 PM  RESPONDING  PROVIDER #:        [id number]           QUERY TEXT:    Please clarify status of pneumonia:    NOTE:  If an appropriate response is not listed below, please respond with a new note.       The patient's Clinical Indicators include:  Patient was admitted with suspected pneumonia and diagnosed with sepsis.  Patient received IV Cefepime this admission.  Patient has a known history of interstitial lung disease (ILD) of unclear etiology so Pulmonary was consulted, performed BAL.     Patient was discharged on steroid therapy, per d/c summary \"It is not thought that she had an infectious process.\"  However pneumonia is listed as a discharge diagnosis.  Options provided:   -- Pneumonia is ruled in   -- Pneumonia is ruled out   -- Unable to determine      Query created by: Nell John on 10/21/2019 3:38 PM    RESPONSE TEXT:    Pneumonia is ruled in          Electronically signed by:  GRISELDA ISABEL 10/29/2019 7:27 AM              "

## 2019-10-30 ENCOUNTER — HOSPITAL ENCOUNTER (OUTPATIENT)
Facility: MEDICAL CENTER | Age: 59
End: 2019-10-30
Attending: SURGERY | Admitting: SURGERY
Payer: COMMERCIAL

## 2019-11-13 LAB
FUNGUS SPEC CULT: NORMAL
FUNGUS SPEC CULT: NORMAL
SIGNIFICANT IND 70042: NORMAL
SIGNIFICANT IND 70042: NORMAL
SITE SITE: NORMAL
SITE SITE: NORMAL
SOURCE SOURCE: NORMAL
SOURCE SOURCE: NORMAL

## 2019-12-05 ENCOUNTER — TELEPHONE (OUTPATIENT)
Dept: MEDICAL GROUP | Facility: MEDICAL CENTER | Age: 59
End: 2019-12-05

## 2019-12-05 DIAGNOSIS — E11.9 TYPE 2 DIABETES MELLITUS WITHOUT COMPLICATION, WITHOUT LONG-TERM CURRENT USE OF INSULIN (HCC): ICD-10-CM

## 2019-12-05 DIAGNOSIS — Z00.00 ANNUAL PHYSICAL EXAM: ICD-10-CM

## 2019-12-05 DIAGNOSIS — Z12.31 SCREENING MAMMOGRAM, ENCOUNTER FOR: ICD-10-CM

## 2019-12-05 DIAGNOSIS — E89.0 POSTOPERATIVE HYPOTHYROIDISM: ICD-10-CM

## 2019-12-05 DIAGNOSIS — I10 ESSENTIAL HYPERTENSION: ICD-10-CM

## 2019-12-05 DIAGNOSIS — E78.2 MIXED HYPERLIPIDEMIA: ICD-10-CM

## 2019-12-06 NOTE — TELEPHONE ENCOUNTER
----- Message from Juana Delgado, Med Ass't sent at 12/4/2019  5:25 PM PST -----  Regarding: FW: Non-Urgent Medical Question  Contact: [phone]    ----- Message -----  From: Lillian Addison  Sent: 12/4/2019   5:14 PM PST  To: Shaneka Roberts  Subject: Non-Urgent Medical Question                      Could I please get orders for annual 3D mammogram and labs, due in January.  Thank you!

## 2019-12-11 LAB
MYCOBACTERIUM SPEC CULT: NORMAL
MYCOBACTERIUM SPEC CULT: NORMAL
RHODAMINE-AURAMINE STN SPEC: NORMAL
RHODAMINE-AURAMINE STN SPEC: NORMAL
SIGNIFICANT IND 70042: NORMAL
SIGNIFICANT IND 70042: NORMAL
SITE SITE: NORMAL
SITE SITE: NORMAL
SOURCE SOURCE: NORMAL
SOURCE SOURCE: NORMAL

## 2019-12-16 ENCOUNTER — OFFICE VISIT (OUTPATIENT)
Dept: PULMONOLOGY | Facility: HOSPICE | Age: 59
End: 2019-12-16
Payer: COMMERCIAL

## 2019-12-16 VITALS
BODY MASS INDEX: 25.92 KG/M2 | SYSTOLIC BLOOD PRESSURE: 112 MMHG | OXYGEN SATURATION: 94 % | DIASTOLIC BLOOD PRESSURE: 72 MMHG | TEMPERATURE: 98.1 F | WEIGHT: 175 LBS | HEIGHT: 69 IN | HEART RATE: 72 BPM | RESPIRATION RATE: 16 BRPM

## 2019-12-16 DIAGNOSIS — R05.9 COUGH: ICD-10-CM

## 2019-12-16 DIAGNOSIS — Z98.84 HISTORY OF ROUX-EN-Y GASTRIC BYPASS: ICD-10-CM

## 2019-12-16 DIAGNOSIS — J84.9 ILD (INTERSTITIAL LUNG DISEASE) (HCC): ICD-10-CM

## 2019-12-16 PROCEDURE — 99214 OFFICE O/P EST MOD 30 MIN: CPT | Performed by: INTERNAL MEDICINE

## 2019-12-16 RX ORDER — OMEPRAZOLE 40 MG/1
CAPSULE, DELAYED RELEASE ORAL
COMMUNITY
Start: 2019-11-15 | End: 2020-11-17 | Stop reason: SDUPTHER

## 2019-12-16 ASSESSMENT — ENCOUNTER SYMPTOMS
PHOTOPHOBIA: 0
COUGH: 0
DOUBLE VISION: 0
NECK PAIN: 0
SINUS PAIN: 0
SORE THROAT: 0
HEARTBURN: 0
DEPRESSION: 0
PND: 0
EYE REDNESS: 0
CONSTIPATION: 0
DIZZINESS: 0
VOMITING: 0
DIARRHEA: 0
BACK PAIN: 0
WEAKNESS: 0
NAUSEA: 0
FOCAL WEAKNESS: 0
SPUTUM PRODUCTION: 0
WEIGHT LOSS: 0
EYE PAIN: 0
CHILLS: 0
HEMOPTYSIS: 0
ABDOMINAL PAIN: 0
SHORTNESS OF BREATH: 0
CLAUDICATION: 0
PALPITATIONS: 0
EYE DISCHARGE: 0
HEADACHES: 0
ORTHOPNEA: 0
STRIDOR: 0
TREMORS: 0
DIAPHORESIS: 0
BLURRED VISION: 0
SPEECH CHANGE: 0
FALLS: 0
MYALGIAS: 0
FEVER: 0
WHEEZING: 0

## 2019-12-17 ENCOUNTER — PATIENT MESSAGE (OUTPATIENT)
Dept: MEDICAL GROUP | Facility: MEDICAL CENTER | Age: 59
End: 2019-12-17

## 2019-12-17 NOTE — PROGRESS NOTES
Chief Complaint   Patient presents with   • Follow-Up     last seen 10/17/19    • Results     labs 10/19/19          HPI: This patient is a 59 y.o. female whom is followed in our clinic for recurrent acute respiratory failure last seen by me on 10/17/19.   The patient's past medical history is significant for hypertension, prediabetes currently controlled with diet, history of obesity treated with Kennedy-en-Y gastric bypass.  Her respiratory hx began earlier this year when she was hospitalized for acute hypoxic respiratory failure after a trip to [place] with family where several other family members became ill although not the same severity.  She did require ICU admission with high flow nasal cannula oxygen due to severe hypoxia.  A CT chest that admission showed no pulmonary embolism but did show extensive groundglass opacities bilaterally in both upper and lower lung fields but predominantly upper lobes. Echocardiogram showed normal left ventricular ejection fraction of 65%.  She was tx with abx and mild diruesis.  Following diuresis the patient had bronchoscopy on April 23 with a BAL showing no organisms, fluid was described as red and bloody with rare WBCs, 100% polys.  She was tx with abx and prednisone, 40 mg to take 5 days upon discharge.  The patient did not require oxygen at the time of discharge.  I saw her in f/u and she was using flovent twice daily as well as as needed EDER via nebulizer. She had been told that she had an abnormal lung exam in the past and was given rescue inhaler although outside of a PNA in 11/2018 and her hospital stay in April, she denied chronic respiratory sxs. PFTs showed bronchodilator responsiveness but otherwise normal from Jan 2019.  We repeated a CT chest in June which showed near resolution of her GGO despite a persistently abnormal exam but overall sxs were improved.  She began having increased cough and chest congestion in early September and had stopped flovent so  I  recommended she resume flovent and start flonase as well as OTC mucinex for upper airway congestion.  Pt sxs continued despite therapy and her SaO2 began to drop and we saw her for sick visit 9/18 when she had markedly abnormal and SaO2 is 86% on RA at rest.  I admitted her to the hospital where CT was not obtained due to malfunction of machine and the presumptive dx was aspiration pna in pt with prior Kennedy-en-Y.  She was d/c'd with supplemental O2, abx but NO steroids this time and recommended to f/u with bariatric surgery for possible revision.  She had f/u HRCT chest which showed some areas of improvement and some new small areas of GGO. I requested earlier f/u with me however pt required repeat admission for worsening SOB on 10/13 at which pt a CTA was done and although different protocol, appeared to have increasing GGO mainly peripheral in location since 9/27 but not as severe as CT from April.  She underwent bronchoscopy again which showed no e/o infx. Cells were mainly lymphocytes this time, no transbronchial bx performed.  She was tx with higher dose steroids and and has improved sxs at f/u with me 10/17.  PFts at that time showed mild restriction of airflows, normal TLC, mild air trapping and normal DLCO. We sent expanded CTD serologies all of which have been negative and referred her to [facility] ILD program while she was getting bariatric eval. she presents today after long steroid taper and has been off prednisone for the past 9 days.  Her oxygen has remained within normal limits and her symptoms for the most part improved although she does report increase in gravelly voice and mild cough over the past 24 hours.  Her proton pump inhibitor was increased from 20 mg to 40 mg when seen by GI and she has plans to undergo manometry, pH testing and EGD in early January.  She has an initial appointment with [facility] in February.  No new imaging today.       Past Medical History:   Diagnosis Date   • Asthma    •  Diabetes (HCC)    • Hyperlipidemia    • Hypertension    • Thyroid disease        Social History     Socioeconomic History   • Marital status:      Spouse name: Not on file   • Number of children: Not on file   • Years of education: Not on file   • Highest education level: Not on file   Occupational History   • Not on file   Social Needs   • Financial resource strain: Not on file   • Food insecurity:     Worry: Not on file     Inability: Not on file   • Transportation needs:     Medical: Not on file     Non-medical: Not on file   Tobacco Use   • Smoking status: Former Smoker     Packs/day: 1.00     Years: 20.00     Pack years: 20.00     Types: Cigarettes     Last attempt to quit: 1994     Years since quittin.9   • Smokeless tobacco: Never Used   Substance and Sexual Activity   • Alcohol use: Yes     Alcohol/week: 0.6 - 1.2 oz     Types: 1 - 2 Shots of liquor per week     Comment: 1-2x per week, 1-2 drinks   • Drug use: Not Currently     Types: Marijuana, Oral     Comment: edibled   • Sexual activity: Not on file   Lifestyle   • Physical activity:     Days per week: Not on file     Minutes per session: Not on file   • Stress: Not on file   Relationships   • Social connections:     Talks on phone: Not on file     Gets together: Not on file     Attends Alevism service: Not on file     Active member of club or organization: Not on file     Attends meetings of clubs or organizations: Not on file     Relationship status: Not on file   • Intimate partner violence:     Fear of current or ex partner: Not on file     Emotionally abused: Not on file     Physically abused: Not on file     Forced sexual activity: Not on file   Other Topics Concern   • Not on file   Social History Narrative   • Not on file       Family History   Problem Relation Age of Onset   • Cancer Mother 55        breast   • Heart Disease Mother         MI   • Cancer Maternal Grandmother         breast   • Cancer Sister         breast   •  Diabetes Father    • Cancer Father         SCC lung       Current Outpatient Medications on File Prior to Visit   Medication Sig Dispense Refill   • omeprazole (PRILOSEC) 40 MG delayed-release capsule TK 1 C PO QD 30 MIN B JASSON MEAL     • Coenzyme Q10 (COQ-10 PO) Take  by mouth.     • levothyroxine (SYNTHROID) 88 MCG Tab Take 88 mcg by mouth Every morning on an empty stomach.     • ibuprofen (MOTRIN) 200 MG Tab Take 400 mg by mouth every 6 hours as needed for Mild Pain.     • CALCIUM PO Take 1 Tab by mouth every day.     • IRON PO Take 1 Tab by mouth every evening.     • Ascorbic Acid (VITAMIN C PO) Take 1 Tab by mouth every evening.     • albuterol 108 (90 Base) MCG/ACT Aero Soln inhalation aerosol Inhale 2 Puffs by mouth every 6 hours as needed for Shortness of Breath.     • vitamin D (CHOLECALCIFEROL) 1000 UNIT Tab Take 1,000 Units by mouth every day.     • therapeutic multivitamin-minerals (THERAGRAN-M) Tab Take 1 Tab by mouth every day.     • magnesium gluconate (MAG-G) 500 MG tablet Take 500 mg by mouth every day.     • cyanocobalamin (VITAMIN B-12) 500 MCG Tab Take 500 mcg by mouth every day.     • lisinopril (PRINIVIL) 20 MG TABS Take 20 mg by mouth every day.     • Probiotic Product (PROBIOTIC DAILY PO) Take  by mouth.     • predniSONE (DELTASONE) 10 MG Tab Take 30mg x 14 days, then take 20mg x 14 days, then take 10mg x 14 days, with food, then discontinue. (Patient not taking: Reported on 12/16/2019) 75 Tab 0   • Dexlansoprazole (DEXILANT) 60 MG CAPSULE DELAYED RELEASE delayed-release capsule Take 60 mg by mouth every day.     • fluticasone (FLOVENT HFA) 110 MCG/ACT Aerosol Inhale 2 Puffs by mouth every 12 hours. (Patient not taking: Reported on 12/16/2019) 1 Inhaler 6   • Omega-3 Fatty Acids (FISH OIL) 1000 MG Cap capsule Take 1,000 mg by mouth every day.       No current facility-administered medications on file prior to visit.        Codeine and Sulfa drugs      ROS:   Review of Systems   Constitutional:  "Negative for chills, diaphoresis, fever, malaise/fatigue and weight loss.   HENT: Negative for congestion, ear discharge, ear pain, hearing loss, nosebleeds, sinus pain, sore throat and tinnitus.    Eyes: Negative for blurred vision, double vision, photophobia, pain, discharge and redness.   Respiratory: Negative for cough, hemoptysis, sputum production, shortness of breath, wheezing and stridor.    Cardiovascular: Negative for chest pain, palpitations, orthopnea, claudication, leg swelling and PND.   Gastrointestinal: Negative for abdominal pain, constipation, diarrhea, heartburn, nausea and vomiting.   Genitourinary: Negative for dysuria and urgency.   Musculoskeletal: Negative for back pain, falls, joint pain, myalgias and neck pain.   Skin: Negative for itching and rash.   Neurological: Negative for dizziness, tremors, speech change, focal weakness, weakness and headaches.   Endo/Heme/Allergies: Negative for environmental allergies.   Psychiatric/Behavioral: Negative for depression.       /72 (BP Location: Left arm, Patient Position: Sitting, BP Cuff Size: Adult)   Pulse 72   Temp 36.7 °C (98.1 °F) (Temporal)   Resp 16   Ht 1.753 m (5' 9\")   Wt 79.4 kg (175 lb)   SpO2 94%   Physical Exam  Constitutional:       General: She is not in acute distress.     Appearance: Normal appearance. She is well-developed.   HENT:      Head: Normocephalic and atraumatic.      Right Ear: External ear normal.      Left Ear: External ear normal.      Nose: Nose normal.      Mouth/Throat:      Mouth: Mucous membranes are moist.      Pharynx: Oropharynx is clear. No oropharyngeal exudate.   Eyes:      General: No scleral icterus.     Conjunctiva/sclera: Conjunctivae normal.      Pupils: Pupils are equal, round, and reactive to light.   Neck:      Musculoskeletal: Neck supple.      Vascular: No JVD.      Trachea: No tracheal deviation.   Cardiovascular:      Rate and Rhythm: Normal rate and regular rhythm.      Heart " sounds: Normal heart sounds. No murmur. No friction rub. No gallop.    Pulmonary:      Effort: Pulmonary effort is normal. No accessory muscle usage or respiratory distress.      Breath sounds: No wheezing or rales.      Comments: Coarse bs b/l lower lung fields  Abdominal:      General: There is no distension.      Palpations: Abdomen is soft.      Tenderness: There is no tenderness.   Musculoskeletal: Normal range of motion.         General: No tenderness or deformity.   Lymphadenopathy:      Cervical: No cervical adenopathy.   Skin:     General: Skin is warm and dry.      Findings: No rash.      Nails: There is no clubbing.     Neurological:      Mental Status: She is alert and oriented to person, place, and time.      Cranial Nerves: No cranial nerve deficit.      Gait: Gait normal.   Psychiatric:         Mood and Affect: Mood normal.         Behavior: Behavior normal.         PFTs as reviewed by me personally: As per HPI    Imaging as reviewed by me personally: As per HPI    Assessment:  1. Cough     2. ILD (interstitial lung disease) (HCC)     3. History of Kennedy-en-Y gastric bypass         Plan:  1.  This is recurrent and has responded to inhaled corticosteroids.  It is typically associated with new pulmonary infiltrates and highly concerning for aspiration events in the setting of prior gastric bypass with Kennedy-en-Y.  Patient does feel her symptoms have improved with increase in proton pump inhibitor dosage as well as elevation of her head of bed at night with a wedge pillow.  At this point I think we should continue to remain off steroids unless her symptoms worsen at which point we discussed resuming steroids at a lower dose such as 20 mg and if symptoms improve after 1 week reducing this to 10 mg.  In the meantime we will await GI evaluation and keep her appointment with ILD clinic at [facility].  I will try to see her after her GI work-up and before her appointment at [facility] to review results of GI  work-up and determine if [facility] eval is still appropriate.  2.  Patient has transient infiltrates that come and go and if persistently abnormal exam although today's exam is markedly improved from my previous appointments with her.  Her oxygenation is markedly improved after steroids suggesting an inflammatory process.  Connective tissue disease work-up has been unrevealing to date and she does have an appointment with ILD clinic at [facility] in February as per above.  In the meantime we will proceed with GI evaluation and I will see her after this is completed and before she sees pulmonary at [facility].  3.  As per above there is concern that she has recurrent aspiration events related to her Kennedy-en-Y.  She has been seen by CT surgery and referred to gastroenterology for ongoing evaluation.  We will follow-up after her test in January as per above.  Return in about 8 weeks (around 2/10/2020) for ILD.

## 2019-12-19 RX ORDER — LEVOTHYROXINE SODIUM 88 UG/1
88 TABLET ORAL
Qty: 90 TAB | Refills: 1 | Status: SHIPPED | OUTPATIENT
Start: 2019-12-19 | End: 2020-06-03

## 2019-12-27 ENCOUNTER — HOSPITAL ENCOUNTER (OUTPATIENT)
Dept: RADIOLOGY | Facility: MEDICAL CENTER | Age: 59
End: 2019-12-27
Attending: NURSE PRACTITIONER
Payer: COMMERCIAL

## 2019-12-27 DIAGNOSIS — Z12.31 VISIT FOR SCREENING MAMMOGRAM: ICD-10-CM

## 2019-12-27 PROCEDURE — 77063 BREAST TOMOSYNTHESIS BI: CPT

## 2020-01-08 ENCOUNTER — HOSPITAL ENCOUNTER (OUTPATIENT)
Dept: RADIOLOGY | Facility: MEDICAL CENTER | Age: 60
End: 2020-01-08

## 2020-01-08 ENCOUNTER — HOSPITAL ENCOUNTER (OUTPATIENT)
Dept: LAB | Facility: MEDICAL CENTER | Age: 60
End: 2020-01-08
Attending: NURSE PRACTITIONER
Payer: COMMERCIAL

## 2020-01-08 DIAGNOSIS — Z00.00 ANNUAL PHYSICAL EXAM: ICD-10-CM

## 2020-01-08 DIAGNOSIS — I10 ESSENTIAL HYPERTENSION: ICD-10-CM

## 2020-01-08 DIAGNOSIS — E89.0 POSTOPERATIVE HYPOTHYROIDISM: ICD-10-CM

## 2020-01-08 DIAGNOSIS — E11.9 TYPE 2 DIABETES MELLITUS WITHOUT COMPLICATION, WITHOUT LONG-TERM CURRENT USE OF INSULIN (HCC): ICD-10-CM

## 2020-01-08 DIAGNOSIS — E78.2 MIXED HYPERLIPIDEMIA: ICD-10-CM

## 2020-01-08 LAB
25(OH)D3 SERPL-MCNC: 43 NG/ML (ref 30–100)
ALBUMIN SERPL BCP-MCNC: 3.8 G/DL (ref 3.2–4.9)
ALBUMIN/GLOB SERPL: 1 G/DL
ALP SERPL-CCNC: 55 U/L (ref 30–99)
ALT SERPL-CCNC: 19 U/L (ref 2–50)
ANION GAP SERPL CALC-SCNC: 10 MMOL/L (ref 0–11.9)
AST SERPL-CCNC: 18 U/L (ref 12–45)
BASOPHILS # BLD AUTO: 1 % (ref 0–1.8)
BASOPHILS # BLD: 0.13 K/UL (ref 0–0.12)
BILIRUB SERPL-MCNC: 0.8 MG/DL (ref 0.1–1.5)
BUN SERPL-MCNC: 14 MG/DL (ref 8–22)
CALCIUM SERPL-MCNC: 9.9 MG/DL (ref 8.5–10.5)
CHLORIDE SERPL-SCNC: 104 MMOL/L (ref 96–112)
CHOLEST SERPL-MCNC: 203 MG/DL (ref 100–199)
CO2 SERPL-SCNC: 30 MMOL/L (ref 20–33)
CREAT SERPL-MCNC: 0.72 MG/DL (ref 0.5–1.4)
CREAT UR-MCNC: 129.1 MG/DL
EOSINOPHIL # BLD AUTO: 0.16 K/UL (ref 0–0.51)
EOSINOPHIL NFR BLD: 1.2 % (ref 0–6.9)
ERYTHROCYTE [DISTWIDTH] IN BLOOD BY AUTOMATED COUNT: 54.4 FL (ref 35.9–50)
FASTING STATUS PATIENT QL REPORTED: NORMAL
GLOBULIN SER CALC-MCNC: 3.9 G/DL (ref 1.9–3.5)
GLUCOSE SERPL-MCNC: 113 MG/DL (ref 65–99)
HCT VFR BLD AUTO: 50.1 % (ref 37–47)
HDLC SERPL-MCNC: 92 MG/DL
HGB BLD-MCNC: 16.5 G/DL (ref 12–16)
IMM GRANULOCYTES # BLD AUTO: 0.05 K/UL (ref 0–0.11)
IMM GRANULOCYTES NFR BLD AUTO: 0.4 % (ref 0–0.9)
LDLC SERPL CALC-MCNC: 89 MG/DL
LYMPHOCYTES # BLD AUTO: 0.91 K/UL (ref 1–4.8)
LYMPHOCYTES NFR BLD: 6.9 % (ref 22–41)
MCH RBC QN AUTO: 32 PG (ref 27–33)
MCHC RBC AUTO-ENTMCNC: 32.9 G/DL (ref 33.6–35)
MCV RBC AUTO: 97.1 FL (ref 81.4–97.8)
MICROALBUMIN UR-MCNC: 0.7 MG/DL
MICROALBUMIN/CREAT UR: 5 MG/G (ref 0–30)
MONOCYTES # BLD AUTO: 0.67 K/UL (ref 0–0.85)
MONOCYTES NFR BLD AUTO: 5.1 % (ref 0–13.4)
NEUTROPHILS # BLD AUTO: 11.22 K/UL (ref 2–7.15)
NEUTROPHILS NFR BLD: 85.4 % (ref 44–72)
NRBC # BLD AUTO: 0 K/UL
NRBC BLD-RTO: 0 /100 WBC
PLATELET # BLD AUTO: 293 K/UL (ref 164–446)
PMV BLD AUTO: 9.3 FL (ref 9–12.9)
POTASSIUM SERPL-SCNC: 3.9 MMOL/L (ref 3.6–5.5)
PROT SERPL-MCNC: 7.7 G/DL (ref 6–8.2)
RBC # BLD AUTO: 5.16 M/UL (ref 4.2–5.4)
SODIUM SERPL-SCNC: 144 MMOL/L (ref 135–145)
T3 SERPL-MCNC: 69.6 NG/DL (ref 60–181)
T4 FREE SERPL-MCNC: 1.25 NG/DL (ref 0.53–1.43)
TRIGL SERPL-MCNC: 110 MG/DL (ref 0–149)
TSH SERPL DL<=0.005 MIU/L-ACNC: 1.56 UIU/ML (ref 0.38–5.33)
WBC # BLD AUTO: 13.1 K/UL (ref 4.8–10.8)

## 2020-01-08 PROCEDURE — 82306 VITAMIN D 25 HYDROXY: CPT

## 2020-01-08 PROCEDURE — 84443 ASSAY THYROID STIM HORMONE: CPT

## 2020-01-08 PROCEDURE — 83036 HEMOGLOBIN GLYCOSYLATED A1C: CPT

## 2020-01-08 PROCEDURE — 85025 COMPLETE CBC W/AUTO DIFF WBC: CPT

## 2020-01-08 PROCEDURE — 80061 LIPID PANEL: CPT

## 2020-01-08 PROCEDURE — 84439 ASSAY OF FREE THYROXINE: CPT

## 2020-01-08 PROCEDURE — 82043 UR ALBUMIN QUANTITATIVE: CPT

## 2020-01-08 PROCEDURE — 80053 COMPREHEN METABOLIC PANEL: CPT

## 2020-01-08 PROCEDURE — 36415 COLL VENOUS BLD VENIPUNCTURE: CPT

## 2020-01-08 PROCEDURE — 82570 ASSAY OF URINE CREATININE: CPT

## 2020-01-08 PROCEDURE — 84480 ASSAY TRIIODOTHYRONINE (T3): CPT

## 2020-01-09 LAB
EST. AVERAGE GLUCOSE BLD GHB EST-MCNC: 123 MG/DL
HBA1C MFR BLD: 5.9 % (ref 0–5.6)

## 2020-01-13 ENCOUNTER — TELEPHONE (OUTPATIENT)
Dept: MEDICAL GROUP | Facility: MEDICAL CENTER | Age: 60
End: 2020-01-13

## 2020-01-13 ENCOUNTER — OFFICE VISIT (OUTPATIENT)
Dept: MEDICAL GROUP | Facility: MEDICAL CENTER | Age: 60
End: 2020-01-13
Payer: COMMERCIAL

## 2020-01-13 VITALS
TEMPERATURE: 97.8 F | HEIGHT: 69 IN | BODY MASS INDEX: 26.51 KG/M2 | DIASTOLIC BLOOD PRESSURE: 92 MMHG | OXYGEN SATURATION: 96 % | HEART RATE: 76 BPM | SYSTOLIC BLOOD PRESSURE: 132 MMHG | WEIGHT: 179 LBS

## 2020-01-13 DIAGNOSIS — T17.900S SILENT ASPIRATION, SEQUELA: ICD-10-CM

## 2020-01-13 DIAGNOSIS — E89.0 POSTOPERATIVE HYPOTHYROIDISM: ICD-10-CM

## 2020-01-13 DIAGNOSIS — E11.9 TYPE 2 DIABETES MELLITUS WITHOUT COMPLICATION, WITHOUT LONG-TERM CURRENT USE OF INSULIN (HCC): ICD-10-CM

## 2020-01-13 DIAGNOSIS — Z00.00 ANNUAL PHYSICAL EXAM: ICD-10-CM

## 2020-01-13 DIAGNOSIS — E78.2 MIXED HYPERLIPIDEMIA: ICD-10-CM

## 2020-01-13 PROBLEM — A41.9 SEPSIS (HCC): Status: RESOLVED | Noted: 2019-10-13 | Resolved: 2020-01-13

## 2020-01-13 PROBLEM — R73.03 PREDIABETES: Status: RESOLVED | Noted: 2019-09-25 | Resolved: 2020-01-13

## 2020-01-13 PROCEDURE — 99396 PREV VISIT EST AGE 40-64: CPT | Performed by: NURSE PRACTITIONER

## 2020-01-13 RX ORDER — ZALEPLON 10 MG/1
CAPSULE ORAL
COMMUNITY
Start: 2019-11-01 | End: 2020-03-13

## 2020-01-13 ASSESSMENT — PATIENT HEALTH QUESTIONNAIRE - PHQ9: CLINICAL INTERPRETATION OF PHQ2 SCORE: 0

## 2020-01-14 NOTE — ASSESSMENT & PLAN NOTE
Stable, non medicated. Eating healthy, well balanced diet.     Results for GINNA ROCHE (MRN [id number]) as of 1/13/2020 16:27   Ref. Range 10/19/2019 08:52 1/8/2020 09:12   Cholesterol,Tot Latest Ref Range: 100 - 199 mg/dL 219 (H) 203 (H)   Triglycerides Latest Ref Range: 0 - 149 mg/dL 109 110   HDL Latest Ref Range: >=40 mg/dL 72 92   LDL Latest Ref Range: <100 mg/dL 125 (H) 89

## 2020-01-14 NOTE — TELEPHONE ENCOUNTER
Pt notified via vm  ----- Message from JENNY Orr sent at 1/13/2020  3:32 PM PST -----  Will discuss lab results at future appointment.   JENNY Orr

## 2020-01-14 NOTE — ASSESSMENT & PLAN NOTE
Diet controlled. Did not tolerate metformin due to nerve symptoms. A1C 5.9. Still using steroids as needed for flares in lung disease.

## 2020-01-14 NOTE — ASSESSMENT & PLAN NOTE
Planning for Endoscopy and manometry and pH testing 1/21 and 1/30. Has follow up with pulmonology 2/11 and Dr. Castillo 2/12, and will decide if she needs to go to [facility].

## 2020-01-14 NOTE — PROGRESS NOTES
Subjective:   Lillian Addison is a 59 y.o. female here today for lab review and annual:    Type 2 diabetes mellitus without complication, without long-term current use of insulin (HCC)  Diet controlled. Did not tolerate metformin due to nerve symptoms. A1C 5.9. Still using steroids as needed for flares in lung disease.     Silent aspiration  Planning for Endoscopy and manometry and pH testing 1/21 and 1/30. Has follow up with pulmonology 2/11 and Dr. Castillo 2/12, and will decide if she needs to go to [facility].     Postoperative hypothyroidism  Labs stable on levothyroxine 88 mcg daily. Denies hair loss, skin changes, weight gain, fatigue.     Mixed hyperlipidemia  Stable, non medicated. Eating healthy, well balanced diet.     Results for LILLIAN ADDISON (MRN [id number]) as of 1/13/2020 16:27   Ref. Range 10/19/2019 08:52 1/8/2020 09:12   Cholesterol,Tot Latest Ref Range: 100 - 199 mg/dL 219 (H) 203 (H)   Triglycerides Latest Ref Range: 0 - 149 mg/dL 109 110   HDL Latest Ref Range: >=40 mg/dL 72 92   LDL Latest Ref Range: <100 mg/dL 125 (H) 89        Current medicines (including changes today)  Current Outpatient Medications   Medication Sig Dispense Refill   • zaleplon (SONATA) 10 MG capsule TK 1 C PO QD HS PRN FOR UP TO 30 DAYS     • levothyroxine (SYNTHROID) 88 MCG Tab Take 1 Tab by mouth Every morning on an empty stomach. 90 Tab 1   • omeprazole (PRILOSEC) 40 MG delayed-release capsule TK 1 C PO QD 30 MIN B JASSON MEAL     • Coenzyme Q10 (COQ-10 PO) Take  by mouth.     • Probiotic Product (PROBIOTIC DAILY PO) Take  by mouth.     • Dexlansoprazole (DEXILANT) 60 MG CAPSULE DELAYED RELEASE delayed-release capsule Take 60 mg by mouth every day.     • ibuprofen (MOTRIN) 200 MG Tab Take 400 mg by mouth every 6 hours as needed for Mild Pain.     • CALCIUM PO Take 1 Tab by mouth every day.     • IRON PO Take 1 Tab by mouth every evening.     • Ascorbic Acid (VITAMIN C PO) Take 1 Tab by mouth every evening.     •  "albuterol 108 (90 Base) MCG/ACT Aero Soln inhalation aerosol Inhale 2 Puffs by mouth every 6 hours as needed for Shortness of Breath.     • Omega-3 Fatty Acids (FISH OIL) 1000 MG Cap capsule Take 1,000 mg by mouth every day.     • vitamin D (CHOLECALCIFEROL) 1000 UNIT Tab Take 1,000 Units by mouth every day.     • therapeutic multivitamin-minerals (THERAGRAN-M) Tab Take 1 Tab by mouth every day.     • magnesium gluconate (MAG-G) 500 MG tablet Take 500 mg by mouth every day.     • cyanocobalamin (VITAMIN B-12) 500 MCG Tab Take 500 mcg by mouth every day.     • lisinopril (PRINIVIL) 20 MG TABS Take 20 mg by mouth every day.       No current facility-administered medications for this visit.      She  has a past medical history of Asthma, Diabetes (HCC), Hyperlipidemia, Hypertension, and Thyroid disease.    ROS  No chest pain, no shortness of breath, no abdominal pain  Positive ROS as per HPI.  All other systems reviewed and are negative.     Objective:     /92 (BP Location: Right arm, Patient Position: Sitting)   Pulse 76   Temp 36.6 °C (97.8 °F) (Temporal)   Ht 1.753 m (5' 9\")   Wt 81.2 kg (179 lb)   SpO2 96%  Body mass index is 26.43 kg/m².     Physical Exam:  Constitutional: Alert, no distress.  Skin: Warm, dry, good turgor, no rashes in visible areas.  Eye: Equal, round and reactive, conjunctiva clear, lids normal.  ENMT: Lips without lesions, good dentition, oropharynx clear.  Neck: Trachea midline, no masses, thyroid surgical absent. No cervical or supraclavicular lymphadenopathy  Respiratory: Unlabored respiratory effort, lungs diminished throughout, no wheezes, fine crackles noted throughout.   Cardiovascular: Normal S1, S2, no murmur, no edema.  Abdomen: Soft, non-tender, no masses, no hepatosplenomegaly.  Psych: Alert and oriented x3, normal affect and mood.      Assessment and Plan:   The following treatment plan was discussed    1. Annual physical exam  Patient and I discussed the importance of " healthy lifestyle, with particular emphasis on limiting sugar and carbohydrate intake and increasing plant-based nutrition (for the purposes of weight loss, general health, and prevention of chronic illnesses), as well as regular cardiovascular exercise, proper sleep, and stress management. Patient verbalized understanding.    2. Type 2 diabetes mellitus without complication, without long-term current use of insulin (HCC)  Stable  Now in prediabetic range   Continue healthy diabetic diet    3. Silent aspiration, sequela  Unstable  Continue follow up with [facility] and [facility]    4. Postoperative hypothyroidism  Stable  Continue levothyroxine 88 mcg    5. Mixed hyperlipidemia  Stable  Continue healthy diet      Followup: Return in about 1 year (around 1/13/2021).    I have placed the below orders and discussed them with an approved delegating provider. The MA is performing the below orders under the direction of Dr. Franco

## 2020-02-12 ENCOUNTER — OFFICE VISIT (OUTPATIENT)
Dept: PULMONOLOGY | Facility: HOSPICE | Age: 60
End: 2020-02-12
Payer: COMMERCIAL

## 2020-02-12 VITALS
SYSTOLIC BLOOD PRESSURE: 144 MMHG | TEMPERATURE: 98.4 F | BODY MASS INDEX: 26.14 KG/M2 | HEART RATE: 85 BPM | DIASTOLIC BLOOD PRESSURE: 94 MMHG | OXYGEN SATURATION: 95 % | WEIGHT: 177 LBS | RESPIRATION RATE: 16 BRPM

## 2020-02-12 DIAGNOSIS — R05.9 COUGH: ICD-10-CM

## 2020-02-12 DIAGNOSIS — J96.11 CHRONIC RESPIRATORY FAILURE WITH HYPOXIA (HCC): ICD-10-CM

## 2020-02-12 DIAGNOSIS — J84.9 ILD (INTERSTITIAL LUNG DISEASE) (HCC): ICD-10-CM

## 2020-02-12 DIAGNOSIS — Z98.84 HISTORY OF ROUX-EN-Y GASTRIC BYPASS: ICD-10-CM

## 2020-02-12 PROCEDURE — 99213 OFFICE O/P EST LOW 20 MIN: CPT | Performed by: INTERNAL MEDICINE

## 2020-02-12 SDOH — HEALTH STABILITY: MENTAL HEALTH: HOW OFTEN DO YOU HAVE A DRINK CONTAINING ALCOHOL?: NEVER

## 2020-02-12 SDOH — HEALTH STABILITY: MENTAL HEALTH: HOW MANY STANDARD DRINKS CONTAINING ALCOHOL DO YOU HAVE ON A TYPICAL DAY?: 1 OR 2

## 2020-02-12 SDOH — HEALTH STABILITY: MENTAL HEALTH: HOW OFTEN DO YOU HAVE 6 OR MORE DRINKS ON ONE OCCASION?: NEVER

## 2020-02-12 ASSESSMENT — ENCOUNTER SYMPTOMS
BLURRED VISION: 0
EYE REDNESS: 0
SPEECH CHANGE: 0
STRIDOR: 0
PALPITATIONS: 0
WEIGHT LOSS: 0
ORTHOPNEA: 0
HEMOPTYSIS: 0
EYE PAIN: 0
DIAPHORESIS: 0
FEVER: 0
MYALGIAS: 0
CHILLS: 0
PHOTOPHOBIA: 0
WEAKNESS: 0
HEADACHES: 0
ABDOMINAL PAIN: 0
DIZZINESS: 0
NECK PAIN: 0
VOMITING: 0
FALLS: 0
CONSTIPATION: 0
PND: 0
FOCAL WEAKNESS: 0
EYE DISCHARGE: 0
TREMORS: 0
DIARRHEA: 0
DEPRESSION: 0
DOUBLE VISION: 0
SHORTNESS OF BREATH: 1
NAUSEA: 0
CLAUDICATION: 0
SPUTUM PRODUCTION: 0
COUGH: 0
SORE THROAT: 0
BACK PAIN: 0
SINUS PAIN: 0
WHEEZING: 0
HEARTBURN: 0

## 2020-02-13 NOTE — PROGRESS NOTES
Chief Complaint   Patient presents with   • Follow-Up     last seen 12/16/19          HPI: This patient is a 59 y.o. female whom is followed in our clinic for ILD c/b recurrent episodes of acute respiratory failure last seen by me on 12/16/19.  The patient's past medical history is significant for hypertension, prediabetes currently controlled with diet, history of obesity treated with Kenendy-en-Y gastric bypass.  Her respiratory hx began earlier this year when she was hospitalized for acute hypoxic respiratory failure after a trip to [place] with family where several other family members became ill although not the same severity.  She did require ICU admission with high flow nasal cannula oxygen due to severe hypoxia.  A CT chest that admission showed no pulmonary embolism but did show extensive groundglass opacities bilaterally in both upper and lower lung fields but predominantly upper lobes. Echocardiogram showed normal left ventricular ejection fraction of 65%.  She was tx with abx and mild diruesis.  Following diuresis the patient had bronchoscopy on April 23 with a BAL showing no organisms, fluid was described as red and bloody with rare WBCs, 100% polys.  She was tx with abx and prednisone, 40 mg to take 5 days upon discharge.  The patient did not require oxygen at the time of discharge.  I saw her in f/u and she was using flovent twice daily as well as as needed EDER via nebulizer. She had been told that she had an abnormal lung exam in the past and was given rescue inhaler although outside of a PNA in 11/2018 and her hospital stay in April, she denied chronic respiratory sxs. PFTs showed bronchodilator responsiveness but otherwise normal from Jan 2019.  We repeated a CT chest in June which showed near resolution of her GGO despite a persistently abnormal exam but overall sxs were improved.  She began having increased cough and chest congestion in early September and had stopped flovent so  I recommended she  resume flovent and start flonase as well as OTC mucinex for upper airway congestion.  Pt sxs continued despite therapy and her SaO2 began to drop and we saw her for sick visit 9/18 when she had markedly abnormal and SaO2 is 86% on RA at rest.  I admitted her to the hospital where CT was not obtained due to malfunction of machine and the presumptive dx was aspiration pna in pt with prior Kennedy-en-Y.  She was d/c'd with supplemental O2, abx but NO steroids this time and recommended to f/u with bariatric surgery for possible revision.  She had f/u HRCT chest which showed some areas of improvement and some new small areas of GGO. I requested earlier f/u with me however pt required repeat admission for worsening SOB on 10/13 at which pt a CTA was done and although different protocol, appeared to have increasing GGO mainly peripheral in location since 9/27 but not as severe as CT from April.  She underwent bronchoscopy again which showed no e/o infx. Cells were mainly lymphocytes this time, no transbronchial bx performed.  She was tx with higher dose steroids and had improved sxs at f/u with me 10/17.  PFts at that time showed mild restriction of airflows, normal TLC, mild air trapping and normal DLCO. We sent expanded CTD serologies all of which have been negative and referred her to [facility] ILD program while she was getting bariatric eval.  we planned on seeing her back after full evaluation by GI and CT surgery.  She has undergone EGD, pH testing and manometry in January all of which are negative.  She was subsequently seen by Dr. Castillo to evaluate for reversal of bariatric procedure which she does not feel is necessary at this time given essentially unremarkable GI work-up.  Patient has an appointment in the interstitial lung disease clinic at [facility] next week.  She continues to use Flovent but rarely uses rescue bronchodilator.  She also uses supplemental oxygen at night but her measured SaO2 at home has been  greater than 90% for the most part since our last clinic visit.    Past Medical History:   Diagnosis Date   • Asthma    • Diabetes (HCC)    • Hyperlipidemia    • Hypertension    • Thyroid disease        Social History     Socioeconomic History   • Marital status:      Spouse name: Not on file   • Number of children: Not on file   • Years of education: Not on file   • Highest education level: Not on file   Occupational History   • Not on file   Social Needs   • Financial resource strain: Not on file   • Food insecurity     Worry: Not on file     Inability: Not on file   • Transportation needs     Medical: Not on file     Non-medical: Not on file   Tobacco Use   • Smoking status: Former Smoker     Packs/day: 1.00     Years: 20.00     Pack years: 20.00     Types: Cigarettes     Last attempt to quit: 1994     Years since quittin.0   • Smokeless tobacco: Never Used   Substance and Sexual Activity   • Alcohol use: Yes     Alcohol/week: 0.6 - 1.2 oz     Types: 1 - 2 Shots of liquor per week     Frequency: Never     Drinks per session: 1 or 2     Binge frequency: Never     Comment: 1-2x per week, 1-2 drinks   • Drug use: Not Currently     Types: Marijuana, Oral     Comment: edibled   • Sexual activity: Not on file   Lifestyle   • Physical activity     Days per week: Not on file     Minutes per session: Not on file   • Stress: Not on file   Relationships   • Social connections     Talks on phone: Not on file     Gets together: Not on file     Attends Congregation service: Not on file     Active member of club or organization: Not on file     Attends meetings of clubs or organizations: Not on file     Relationship status: Not on file   • Intimate partner violence     Fear of current or ex partner: Not on file     Emotionally abused: Not on file     Physically abused: Not on file     Forced sexual activity: Not on file   Other Topics Concern   • Not on file   Social History Narrative   • Not on file       Family  History   Problem Relation Age of Onset   • Cancer Mother 55        breast   • Heart Disease Mother         MI   • Cancer Maternal Grandmother         breast   • Cancer Sister         breast   • Diabetes Father    • Cancer Father         SCC lung       Current Outpatient Medications on File Prior to Visit   Medication Sig Dispense Refill   • zaleplon (SONATA) 10 MG capsule TK 1 C PO QD HS PRN FOR UP TO 30 DAYS     • levothyroxine (SYNTHROID) 88 MCG Tab Take 1 Tab by mouth Every morning on an empty stomach. 90 Tab 1   • omeprazole (PRILOSEC) 40 MG delayed-release capsule TK 1 C PO QD 30 MIN B JASSON MEAL     • Coenzyme Q10 (COQ-10 PO) Take  by mouth.     • ibuprofen (MOTRIN) 200 MG Tab Take 400 mg by mouth every 6 hours as needed for Mild Pain.     • CALCIUM PO Take 1 Tab by mouth every day.     • IRON PO Take 1 Tab by mouth every evening.     • Ascorbic Acid (VITAMIN C PO) Take 1 Tab by mouth every evening.     • albuterol 108 (90 Base) MCG/ACT Aero Soln inhalation aerosol Inhale 2 Puffs by mouth every 6 hours as needed for Shortness of Breath.     • Omega-3 Fatty Acids (FISH OIL) 1000 MG Cap capsule Take 1,000 mg by mouth every day.     • vitamin D (CHOLECALCIFEROL) 1000 UNIT Tab Take 1,000 Units by mouth every day.     • therapeutic multivitamin-minerals (THERAGRAN-M) Tab Take 1 Tab by mouth every day.     • magnesium gluconate (MAG-G) 500 MG tablet Take 500 mg by mouth every day.     • cyanocobalamin (VITAMIN B-12) 500 MCG Tab Take 500 mcg by mouth every day.     • lisinopril (PRINIVIL) 20 MG TABS Take 20 mg by mouth every day.     • Probiotic Product (PROBIOTIC DAILY PO) Take  by mouth.     • Dexlansoprazole (DEXILANT) 60 MG CAPSULE DELAYED RELEASE delayed-release capsule Take 60 mg by mouth every day.       No current facility-administered medications on file prior to visit.        Codeine and Sulfa drugs      ROS:   Review of Systems   Constitutional: Negative for chills, diaphoresis, fever, malaise/fatigue and  weight loss.   HENT: Negative for congestion, ear discharge, ear pain, hearing loss, nosebleeds, sinus pain, sore throat and tinnitus.    Eyes: Negative for blurred vision, double vision, photophobia, pain, discharge and redness.   Respiratory: Positive for shortness of breath. Negative for cough, hemoptysis, sputum production, wheezing and stridor.    Cardiovascular: Negative for chest pain, palpitations, orthopnea, claudication, leg swelling and PND.   Gastrointestinal: Negative for abdominal pain, constipation, diarrhea, heartburn, nausea and vomiting.   Genitourinary: Negative for dysuria and urgency.   Musculoskeletal: Negative for back pain, falls, joint pain, myalgias and neck pain.   Skin: Negative for itching and rash.   Neurological: Negative for dizziness, tremors, speech change, focal weakness, weakness and headaches.   Endo/Heme/Allergies: Negative for environmental allergies.   Psychiatric/Behavioral: Negative for depression.       /94 (BP Location: Left arm, Patient Position: Sitting, BP Cuff Size: Adult)   Pulse 85   Temp 36.9 °C (98.4 °F) (Temporal)   Resp 16   Wt 80.3 kg (177 lb)   SpO2 95%   Physical Exam  Constitutional:       General: She is not in acute distress.     Appearance: Normal appearance. She is well-developed and normal weight.   HENT:      Head: Normocephalic and atraumatic.      Right Ear: External ear normal.      Left Ear: External ear normal.      Nose: Nose normal. No congestion.      Mouth/Throat:      Mouth: Mucous membranes are moist.      Pharynx: Oropharynx is clear. No oropharyngeal exudate.   Eyes:      General: No scleral icterus.     Extraocular Movements: Extraocular movements intact.      Conjunctiva/sclera: Conjunctivae normal.      Pupils: Pupils are equal, round, and reactive to light.   Neck:      Musculoskeletal: Normal range of motion and neck supple.      Vascular: No JVD.      Trachea: No tracheal deviation.   Cardiovascular:      Rate and Rhythm:  Normal rate and regular rhythm.      Heart sounds: Normal heart sounds. No murmur. No friction rub. No gallop.    Pulmonary:      Effort: Pulmonary effort is normal. No accessory muscle usage or respiratory distress.      Breath sounds: No wheezing or rales.      Comments: B/l inspiratory squeaks and few coarse rhonchi  Abdominal:      General: There is no distension.      Palpations: Abdomen is soft.      Tenderness: There is no abdominal tenderness.   Musculoskeletal: Normal range of motion.         General: No tenderness or deformity.      Right lower leg: No edema.      Left lower leg: No edema.   Lymphadenopathy:      Cervical: No cervical adenopathy.   Skin:     General: Skin is warm and dry.      Findings: No rash.      Nails: There is no clubbing.     Neurological:      Mental Status: She is alert and oriented to person, place, and time.      Cranial Nerves: No cranial nerve deficit.      Gait: Gait normal.   Psychiatric:         Mood and Affect: Mood normal.         Behavior: Behavior normal.         PFTs as reviewed by me personally:  As per hPI    Imaging as reviewed by me personally:  As per HPI    Assessment:  1. ILD (interstitial lung disease) (HCC)     2. History of Kennedy-en-Y gastric bypass     3. Cough     4. Chronic respiratory failure with hypoxia (HCC)         Plan:  1.  Patient with transient infiltrates that come and go but a persistently abnormal exam.  She appears to have responded to systemic steroids each time suggesting an inflammatory process.  Oxygenation is markedly improved after steroids.  Connective tissue disease work-up to date has been unrevealing.  She has been evaluated by GI including endoscopy, manometry and pH testing all of which argue against significant reflux.  At this point in time I recommend evaluation in ILD clinic at [facility].  She is currently off steroids other than her inhaled corticosteroids and has persistently abnormal exam although SaO2 is 95% on room air in  clinic today.  We will see her back following her evaluation at [facility] to determine next steps.  2.  As per HPI she was evaluated by her surgeon for possible reversal given concern for reflux.  Given fairly unremarkable GI work-up, there is no clear indication for reversal at this time.  Appreciate assistance of bariatric surgery.  3.  This is chronic and overall stable today on inhaled corticosteroids.  She has had adverse reacting to long-acting beta agonist and uses her rescue bronchodilator infrequently.  As per above I suspect an inflammatory process.  Exam remains abnormal.  We will continue ICS for now and have her evaluated in ILD clinic at [facility].  4.  This is acute and occurring in discrete episodes other than at night and appears to respond to corticosteroids.  We have not done formal overnight oximetry study but patient does note she records home SaO2 low on occasion when she wakes up from sleep.  I will have her continue supplemental oxygen use at night and we can obtain formal overnight oximetry study after she is seen at [facility].  Low suspicion for obstructive sleep apnea.  SaO2 on room air today is within normal limits.    Return in about 6 weeks (around 3/25/2020) for ild.

## 2020-03-12 DIAGNOSIS — R06.02 SOB (SHORTNESS OF BREATH): ICD-10-CM

## 2020-03-13 ENCOUNTER — HOSPITAL ENCOUNTER (OUTPATIENT)
Dept: LAB | Facility: MEDICAL CENTER | Age: 60
End: 2020-03-13
Attending: INTERNAL MEDICINE
Payer: COMMERCIAL

## 2020-03-13 DIAGNOSIS — R06.02 SOB (SHORTNESS OF BREATH): ICD-10-CM

## 2020-03-13 LAB — NT-PROBNP SERPL IA-MCNC: 16 PG/ML (ref 0–125)

## 2020-03-13 PROCEDURE — 83880 ASSAY OF NATRIURETIC PEPTIDE: CPT

## 2020-03-13 PROCEDURE — 36415 COLL VENOUS BLD VENIPUNCTURE: CPT

## 2020-03-25 ENCOUNTER — APPOINTMENT (OUTPATIENT)
Dept: PULMONOLOGY | Facility: HOSPICE | Age: 60
End: 2020-03-25
Payer: COMMERCIAL

## 2020-03-25 ENCOUNTER — HOSPITAL ENCOUNTER (OUTPATIENT)
Dept: LAB | Facility: MEDICAL CENTER | Age: 60
End: 2020-03-25
Attending: INTERNAL MEDICINE
Payer: COMMERCIAL

## 2020-03-25 ENCOUNTER — OFFICE VISIT (OUTPATIENT)
Dept: PULMONOLOGY | Facility: HOSPICE | Age: 60
End: 2020-03-25
Payer: COMMERCIAL

## 2020-03-25 VITALS
DIASTOLIC BLOOD PRESSURE: 84 MMHG | WEIGHT: 173 LBS | HEART RATE: 85 BPM | OXYGEN SATURATION: 97 % | SYSTOLIC BLOOD PRESSURE: 136 MMHG | BODY MASS INDEX: 25.55 KG/M2 | RESPIRATION RATE: 16 BRPM | TEMPERATURE: 98.1 F

## 2020-03-25 DIAGNOSIS — D75.1 ERYTHROCYTOSIS: ICD-10-CM

## 2020-03-25 DIAGNOSIS — Z98.84 HISTORY OF ROUX-EN-Y GASTRIC BYPASS: ICD-10-CM

## 2020-03-25 DIAGNOSIS — S27.309D ACUTE LUNG INJURY, SUBSEQUENT ENCOUNTER: ICD-10-CM

## 2020-03-25 DIAGNOSIS — R05.9 COUGH: ICD-10-CM

## 2020-03-25 LAB
BASOPHILS # BLD AUTO: 1.3 % (ref 0–1.8)
BASOPHILS # BLD: 0.13 K/UL (ref 0–0.12)
EOSINOPHIL # BLD AUTO: 0.12 K/UL (ref 0–0.51)
EOSINOPHIL NFR BLD: 1.2 % (ref 0–6.9)
ERYTHROCYTE [DISTWIDTH] IN BLOOD BY AUTOMATED COUNT: 43.2 FL (ref 35.9–50)
HCT VFR BLD AUTO: 50.9 % (ref 37–47)
HGB BLD-MCNC: 17.1 G/DL (ref 12–16)
IMM GRANULOCYTES # BLD AUTO: 0.03 K/UL (ref 0–0.11)
IMM GRANULOCYTES NFR BLD AUTO: 0.3 % (ref 0–0.9)
LYMPHOCYTES # BLD AUTO: 1.21 K/UL (ref 1–4.8)
LYMPHOCYTES NFR BLD: 11.8 % (ref 22–41)
MCH RBC QN AUTO: 31.4 PG (ref 27–33)
MCHC RBC AUTO-ENTMCNC: 33.6 G/DL (ref 33.6–35)
MCV RBC AUTO: 93.4 FL (ref 81.4–97.8)
MONOCYTES # BLD AUTO: 0.78 K/UL (ref 0–0.85)
MONOCYTES NFR BLD AUTO: 7.6 % (ref 0–13.4)
NEUTROPHILS # BLD AUTO: 8.02 K/UL (ref 2–7.15)
NEUTROPHILS NFR BLD: 77.8 % (ref 44–72)
NRBC # BLD AUTO: 0 K/UL
NRBC BLD-RTO: 0 /100 WBC
PLATELET # BLD AUTO: 299 K/UL (ref 164–446)
PMV BLD AUTO: 9.2 FL (ref 9–12.9)
RBC # BLD AUTO: 5.45 M/UL (ref 4.2–5.4)
WBC # BLD AUTO: 10.3 K/UL (ref 4.8–10.8)

## 2020-03-25 PROCEDURE — 99214 OFFICE O/P EST MOD 30 MIN: CPT | Performed by: INTERNAL MEDICINE

## 2020-03-25 PROCEDURE — 85025 COMPLETE CBC W/AUTO DIFF WBC: CPT

## 2020-03-25 PROCEDURE — 36415 COLL VENOUS BLD VENIPUNCTURE: CPT

## 2020-03-25 ASSESSMENT — ENCOUNTER SYMPTOMS
TREMORS: 0
DIAPHORESIS: 0
NECK PAIN: 0
DIZZINESS: 0
CONSTIPATION: 0
BACK PAIN: 0
SPUTUM PRODUCTION: 0
HEADACHES: 0
COUGH: 1
SHORTNESS OF BREATH: 0
SPEECH CHANGE: 0
ABDOMINAL PAIN: 0
EYE DISCHARGE: 0
PALPITATIONS: 0
FALLS: 0
HEARTBURN: 0
ORTHOPNEA: 0
VOMITING: 0
EYE REDNESS: 0
CHILLS: 0
SINUS PAIN: 0
FEVER: 0
DOUBLE VISION: 0
NAUSEA: 0
WEIGHT LOSS: 0
PHOTOPHOBIA: 0
PND: 0
WHEEZING: 0
DIARRHEA: 0
BLURRED VISION: 0
MYALGIAS: 0
WEAKNESS: 0
DEPRESSION: 0
EYE PAIN: 0
HEMOPTYSIS: 0
FOCAL WEAKNESS: 0
CLAUDICATION: 0
SORE THROAT: 0
STRIDOR: 0

## 2020-03-25 ASSESSMENT — FIBROSIS 4 INDEX: FIB4 SCORE: 0.83

## 2020-03-25 NOTE — PROGRESS NOTES
Chief Complaint   Patient presents with   • Follow-Up     ILD last seen 2/12/2020    • Lab Results     labs 3/13/2020 [facility]          HPI: This patient is a 59 y.o. female whom is followed in our clinic for recurrent episodes of acute respiratory failure last seen by me on 2/12/20.   The patient's past medical history is significant for hypertension, prediabetes currently controlled with diet, history of obesity treated with Kennedy-en-Y gastric bypass.  Her respiratory hx began in April of 2014 when she was hospitalized for acute hypoxic respiratory failure after a trip to [place] with family where several other family members became ill.  She did require ICU admission with high flow nasal cannula oxygen due to severe hypoxia.  CT chest that admission showed no pulmonary embolism but did show extensive groundglass opacities bilaterally in both upper and lower lung fields but predominantly upper lobes. Echocardiogram showed normal left ventricular ejection fraction of 65%.  She was tx with abx and mild diruesis.  Following diuresis the patient had bronchoscopy on April 23 with a BAL showing no organisms, fluid was described as red and bloody with rare WBCs, 100% polys.  She was tx with abx and prednisone.  The patient did not require oxygen at the time of discharge.  I saw her in f/u in May and she was using flovent twice daily as well as as needed EDER via nebulizer. She had been told that she had an abnormal lung exam in the past and was given rescue inhaler although outside of a PNA in 11/2018 and her hospital stay in April, she denied chronic respiratory sxs. PFTs showed bronchodilator responsiveness but otherwise normal from Jan 2019.  We repeated a CT chest in June which showed near resolution of her GGO despite a persistently abnormal exam but overall sxs were improved.  She began having increased cough and chest congestion in early September and had stopped flovent so  I recommended she resume flovent and  start flonase as well as OTC mucinex for upper airway congestion.  Pt sxs continued despite therapy and her SaO2 began to drop and we saw her for sick visit 9/18 when she had markedly abnormal and SaO2 is 86% on RA at rest.  I admitted her to the hospital where CT was not obtained due to malfunction of machine and the presumptive dx was aspiration pna in pt with prior Kennedy-en-Y.  She was d/c'd with supplemental O2, abx but NO steroids this time and recommended to f/u with bariatric surgery for possible revision.  She had f/u HRCT chest which showed some areas of improvement and some new small areas of GGO. I requested earlier f/u with me however pt required repeat admission for worsening SOB on 10/13 at which pt a CTA was done and although different protocol, appeared to have increasing GGO mainly peripheral in location since 9/27 but not as severe as CT from April.  She underwent bronchoscopy again which showed no e/o infx. Cells were mainly lymphocytes that time, no transbronchial bx performed.  She was tx with higher dose steroids and had improved sxs at f/u with me 10/17.  Repeat PFts at that time showed mild restriction of airflows, normal TLC, mild air trapping and normal DLCO. We sent expanded CTD serologies all of which have been negative and referred her to [facility] ILD program while she was getting bariatric eval. She has undergone EGD, pH testing and manometry in January all of which are negative other than some reflux without drop in pH.  She was subsequently seen by Dr. Castillo to evaluate for reversal of bariatric procedure which he does not feel is necessary at this time given essentially unremarkable GI work-up.  Since her last appointment she has been seen at the ILD clinic at [facility] where it was felt her symptoms were likely related again to aspiration in the setting of reflux versus cardiac in etiology.  Recommendation was also made to consider hematology consult given persistently elevated  hemoglobin and white blood cell count.  We did repeat a BNP as she has had elevations in her BNP that were mild during previous admissions and repeat NT proBNP is normal.  Patient overall feels well, saturating 97% on room air.  She is started walking daily fairly vigorously of pills up to 3 miles at a time with no shortness of breath.  She continues to use Flovent.  She did have a CBC from January of this year that showed mildly elevated white blood cell count at 13 in addition to hemoglobin of 16.5.  She has increased her proton pump inhibitor to 40 mg daily but otherwise has made no changes in her medical regimen.  Past Medical History:   Diagnosis Date   • Asthma    • Diabetes (HCC)    • Hyperlipidemia    • Hypertension    • Thyroid disease        Social History     Socioeconomic History   • Marital status:      Spouse name: Not on file   • Number of children: Not on file   • Years of education: Not on file   • Highest education level: Not on file   Occupational History   • Not on file   Social Needs   • Financial resource strain: Not on file   • Food insecurity     Worry: Not on file     Inability: Not on file   • Transportation needs     Medical: Not on file     Non-medical: Not on file   Tobacco Use   • Smoking status: Former Smoker     Packs/day: 1.00     Years: 20.00     Pack years: 20.00     Types: Cigarettes     Last attempt to quit: 1994     Years since quittin.2   • Smokeless tobacco: Never Used   Substance and Sexual Activity   • Alcohol use: Yes     Alcohol/week: 0.6 - 1.2 oz     Types: 1 - 2 Shots of liquor per week     Frequency: Never     Drinks per session: 1 or 2     Binge frequency: Never     Comment: 1-2x per week, 1-2 drinks   • Drug use: Not Currently     Types: Marijuana, Oral     Comment: edibled   • Sexual activity: Not on file   Lifestyle   • Physical activity     Days per week: Not on file     Minutes per session: Not on file   • Stress: Not on file   Relationships   •  Social connections     Talks on phone: Not on file     Gets together: Not on file     Attends Oriental orthodox service: Not on file     Active member of club or organization: Not on file     Attends meetings of clubs or organizations: Not on file     Relationship status: Not on file   • Intimate partner violence     Fear of current or ex partner: Not on file     Emotionally abused: Not on file     Physically abused: Not on file     Forced sexual activity: Not on file   Other Topics Concern   • Not on file   Social History Narrative   • Not on file       Family History   Problem Relation Age of Onset   • Cancer Mother 55        breast   • Heart Disease Mother         MI   • Cancer Maternal Grandmother         breast   • Cancer Sister         breast   • Diabetes Father    • Cancer Father         SCC lung       Current Outpatient Medications on File Prior to Visit   Medication Sig Dispense Refill   • zaleplon (SONATA) 10 MG capsule TAKE 1 CAPSULE BY MOUTH EVERY DAY AT BEDTIME AS NEEDED FOR UP TO 30 DAYS 30 Cap 1   • levothyroxine (SYNTHROID) 88 MCG Tab Take 1 Tab by mouth Every morning on an empty stomach. 90 Tab 1   • omeprazole (PRILOSEC) 40 MG delayed-release capsule TK 1 C PO QD 30 MIN B JASSON MEAL     • Coenzyme Q10 (COQ-10 PO) Take  by mouth.     • ibuprofen (MOTRIN) 200 MG Tab Take 400 mg by mouth every 6 hours as needed for Mild Pain.     • CALCIUM PO Take 1 Tab by mouth every day.     • IRON PO Take 1 Tab by mouth every evening.     • Ascorbic Acid (VITAMIN C PO) Take 1 Tab by mouth every evening.     • albuterol 108 (90 Base) MCG/ACT Aero Soln inhalation aerosol Inhale 2 Puffs by mouth every 6 hours as needed for Shortness of Breath.     • Omega-3 Fatty Acids (FISH OIL) 1000 MG Cap capsule Take 1,000 mg by mouth every day.     • vitamin D (CHOLECALCIFEROL) 1000 UNIT Tab Take 1,000 Units by mouth every day.     • therapeutic multivitamin-minerals (THERAGRAN-M) Tab Take 1 Tab by mouth every day.     • magnesium  gluconate (MAG-G) 500 MG tablet Take 500 mg by mouth every day.     • cyanocobalamin (VITAMIN B-12) 500 MCG Tab Take 500 mcg by mouth every day.     • lisinopril (PRINIVIL) 20 MG TABS Take 20 mg by mouth every day.     • Probiotic Product (PROBIOTIC DAILY PO) Take  by mouth.     • Dexlansoprazole (DEXILANT) 60 MG CAPSULE DELAYED RELEASE delayed-release capsule Take 60 mg by mouth every day.       No current facility-administered medications on file prior to visit.        Codeine and Sulfa drugs      ROS:   Review of Systems   Constitutional: Negative for chills, diaphoresis, fever, malaise/fatigue and weight loss.   HENT: Positive for congestion. Negative for ear discharge, ear pain, hearing loss, nosebleeds, sinus pain, sore throat and tinnitus.    Eyes: Negative for blurred vision, double vision, photophobia, pain, discharge and redness.   Respiratory: Positive for cough. Negative for hemoptysis, sputum production, shortness of breath, wheezing and stridor.    Cardiovascular: Negative for chest pain, palpitations, orthopnea, claudication, leg swelling and PND.   Gastrointestinal: Negative for abdominal pain, constipation, diarrhea, heartburn, nausea and vomiting.   Genitourinary: Negative for dysuria and urgency.   Musculoskeletal: Negative for back pain, falls, joint pain, myalgias and neck pain.   Skin: Negative for itching and rash.   Neurological: Negative for dizziness, tremors, speech change, focal weakness, weakness and headaches.   Endo/Heme/Allergies: Negative for environmental allergies.   Psychiatric/Behavioral: Negative for depression.       /84 (BP Location: Left arm, Patient Position: Sitting, BP Cuff Size: Adult)   Pulse 85   Temp 36.7 °C (98.1 °F) (Temporal)   Resp 16   Wt 78.5 kg (173 lb)   SpO2 97%   Physical Exam  Constitutional:       General: She is not in acute distress.     Appearance: Normal appearance. She is well-developed and normal weight.   HENT:      Head: Normocephalic and  atraumatic.      Right Ear: External ear normal.      Left Ear: External ear normal.      Nose: Nose normal. No congestion.      Mouth/Throat:      Mouth: Mucous membranes are moist.      Pharynx: Oropharynx is clear. No oropharyngeal exudate.   Eyes:      General: No scleral icterus.     Extraocular Movements: Extraocular movements intact.      Conjunctiva/sclera: Conjunctivae normal.      Pupils: Pupils are equal, round, and reactive to light.   Neck:      Musculoskeletal: Neck supple.      Vascular: No JVD.      Trachea: No tracheal deviation.   Cardiovascular:      Rate and Rhythm: Normal rate and regular rhythm.      Heart sounds: Normal heart sounds. No murmur. No friction rub. No gallop.    Pulmonary:      Effort: Pulmonary effort is normal. No accessory muscle usage or respiratory distress.      Breath sounds: No wheezing or rales.      Comments: Inspiratory squeaks b/l upper lobes  Abdominal:      General: There is no distension.      Palpations: Abdomen is soft.      Tenderness: There is no abdominal tenderness.   Musculoskeletal: Normal range of motion.         General: No tenderness or deformity.      Right lower leg: No edema.      Left lower leg: No edema.   Lymphadenopathy:      Cervical: No cervical adenopathy.   Skin:     General: Skin is warm and dry.      Findings: No rash.      Nails: There is no clubbing.     Neurological:      Mental Status: She is alert and oriented to person, place, and time.      Cranial Nerves: No cranial nerve deficit.      Gait: Gait normal.   Psychiatric:         Mood and Affect: Mood normal.         Behavior: Behavior normal.         PFTs as reviewed by me personally: as per hPI    Imaging as reviewed by me personally:  As per hPI    Assessment:  1. Erythrocytosis  CBC WITH DIFFERENTIAL   2. Acute lung injury, subsequent encounter     3. History of Kennedy-en-Y gastric bypass     4. Cough         Plan:  1.  Patient has had intermittent erythrocytosis that is mild to  moderate.  In addition to this her white blood cell count has been elevated on multiple occasions however many of these times are during acute presentation or steroid use.  I am recommending we repeat CBC now and if abnormalities persist we can consider hematology consult versus overnight oximetry to rule out sleep disordered breathing.  2.  Patient with intermittent episodes of acute hypoxic respiratory failure with waxing and waning groundglass opacities or alveolar filling process.  No clear evidence for interstitial lung disease and patient has had formal evaluation for this.  Appreciate Dr. Pagan's input.  She has been evaluated for autoimmune disease.  I agree that this could still be retrograde aspiration via reflux.  Less likely but still a possibility would be flash pulmonary edema.  Echocardiogram was within normal limits even in the setting of acute hypoxic respiratory failure in April.  She is tolerating exercise which she has actually been increasing more recently.  At this point work-up would likely include exercise stress test and possible event monitor which would need to be long-term given that we have had a relatively long period without symptoms.  Patient agreed to hold off on cardiology consult at this time.  We will continue to monitor her symptomatically and consider cardiac referral and or open lung biopsy if symptoms recur.  3.  This does put patient at risk for aspiration via reflux.  She has been evaluated by Dr. Castillo been seen by GI.  At this point in time we will continue medical management of reflux, lifestyle modifications.  4.  Patient with mild cough today which she feels is related to postnasal drip.  She has been using Afrin as needed but not intranasal steroids.  I advised her to use this to see if this improves her cough.  Patient states this cough is different than her cough related to previous episodes of respiratory failure.  Return in about 8 years (around 3/25/2028) for  labs.

## 2020-03-26 ENCOUNTER — HOME STUDY (OUTPATIENT)
Dept: PULMONOLOGY | Facility: HOSPICE | Age: 60
End: 2020-03-26
Attending: INTERNAL MEDICINE
Payer: COMMERCIAL

## 2020-03-26 DIAGNOSIS — D75.1 ERYTHROCYTOSIS: ICD-10-CM

## 2020-03-26 PROCEDURE — 94762 N-INVAS EAR/PLS OXIMTRY CONT: CPT | Performed by: INTERNAL MEDICINE

## 2020-03-26 NOTE — PROGRESS NOTES
Irais Pitt M.D.  Jennifer Hunter, Med Ass't             Pt knows I ordered this. We will just need to contact her to set up time she can come .

## 2020-03-28 NOTE — PROCEDURES
Overnight Pulse Oximetry    Study Date: 3/26/2020    Data  Supplemental Oxygen:   2 L/min  Analyzed Time:    8 hours, 15 minutes, 21 seconds  Basal (average) saturation:   93 %  Sleep time with saturations < 88%:  6.7 minutes  Average low saturation:   89.8 %  Maximal single time less than 88%:  228 seconds  Total Desaturation Events:   12  SpO2 Desaturation Index:   1.4 (events/hour)  Average pulse rate:    66.9 bpm  Low pulse rate:    18 bpm    Impression:    Adequate maintenance of saturation at night was seen for the majority of the study period. Several brief periods of desaturation were observed, which appear to be artifactual.  __________  Cyrus Castillo MD  Pulmonary and Critical Care Medicine  [facility]

## 2020-05-26 ENCOUNTER — OFFICE VISIT (OUTPATIENT)
Dept: PULMONOLOGY | Facility: HOSPICE | Age: 60
End: 2020-05-26
Payer: COMMERCIAL

## 2020-05-26 VITALS
DIASTOLIC BLOOD PRESSURE: 92 MMHG | WEIGHT: 179 LBS | HEART RATE: 98 BPM | BODY MASS INDEX: 26.51 KG/M2 | SYSTOLIC BLOOD PRESSURE: 140 MMHG | TEMPERATURE: 98.4 F | RESPIRATION RATE: 16 BRPM | OXYGEN SATURATION: 96 % | HEIGHT: 69 IN

## 2020-05-26 DIAGNOSIS — J45.909 ASTHMA, UNSPECIFIED ASTHMA SEVERITY, UNSPECIFIED WHETHER COMPLICATED, UNSPECIFIED WHETHER PERSISTENT: ICD-10-CM

## 2020-05-26 DIAGNOSIS — R05.9 COUGH: ICD-10-CM

## 2020-05-26 DIAGNOSIS — R91.8 LUNG FIELD ABNORMAL FINDING ON EXAMINATION: ICD-10-CM

## 2020-05-26 DIAGNOSIS — Z98.84 HISTORY OF ROUX-EN-Y GASTRIC BYPASS: ICD-10-CM

## 2020-05-26 PROCEDURE — 99214 OFFICE O/P EST MOD 30 MIN: CPT | Performed by: INTERNAL MEDICINE

## 2020-05-26 RX ORDER — FLUTICASONE PROPIONATE 220 UG/1
2 AEROSOL, METERED RESPIRATORY (INHALATION) 2 TIMES DAILY
Qty: 1 INHALER | Refills: 11 | Status: SHIPPED | OUTPATIENT
Start: 2020-05-26 | End: 2021-05-04

## 2020-05-26 RX ORDER — MONTELUKAST SODIUM 10 MG/1
10 TABLET ORAL
Qty: 30 TAB | Refills: 11 | Status: SHIPPED | OUTPATIENT
Start: 2020-05-26 | End: 2021-05-04

## 2020-05-26 RX ORDER — DEXAMETHASONE 4 MG/1
2 TABLET ORAL 2 TIMES DAILY
COMMUNITY
Start: 2020-04-28 | End: 2020-05-26

## 2020-05-26 ASSESSMENT — ENCOUNTER SYMPTOMS
SHORTNESS OF BREATH: 0
CONSTIPATION: 0
PHOTOPHOBIA: 0
TREMORS: 0
DIAPHORESIS: 0
FALLS: 0
PND: 0
WHEEZING: 0
PALPITATIONS: 0
CLAUDICATION: 0
EYE DISCHARGE: 0
DIARRHEA: 0
EYE REDNESS: 0
NAUSEA: 0
STRIDOR: 0
EYE PAIN: 0
HEARTBURN: 0
WEIGHT LOSS: 0
DOUBLE VISION: 0
WEAKNESS: 0
FEVER: 0
CHILLS: 0
BLURRED VISION: 0
ABDOMINAL PAIN: 0
VOMITING: 0
SORE THROAT: 0
MYALGIAS: 0
COUGH: 1
NECK PAIN: 0
FOCAL WEAKNESS: 0
HEADACHES: 0
SINUS PAIN: 0
SPEECH CHANGE: 0
HEMOPTYSIS: 0
ORTHOPNEA: 0
SPUTUM PRODUCTION: 1
DEPRESSION: 0
BACK PAIN: 0
DIZZINESS: 0

## 2020-05-26 ASSESSMENT — FIBROSIS 4 INDEX: FIB4 SCORE: 0.81

## 2020-05-26 NOTE — PROGRESS NOTES
Chief Complaint   Patient presents with   • Follow-Up     last seen 3/25/2020    • Results     OPO 3/26/2020, labs 3/13-3/25/2020          HPI: This patient is a 59 y.o. female whom is followed in our clinic for RAD and recurrent episodes of acute hypoxic respiratory failure responsive to prednisone with no clear dx last seen by me on 3/25/20.      The patient's past medical history is significant for hypertension, prediabetes currently controlled with diet, history of obesity treated with Kennedy-en-Y gastric bypass.  Her respiratory hx began in April of 2014 when she was hospitalized for acute hypoxic respiratory failure after a trip to [place] with family where several other family members became ill.  She did require ICU admission with high flow nasal cannula oxygen due to severe hypoxia.  CT chest that admission showed no pulmonary embolism but did show extensive groundglass opacities bilaterally in both upper and lower lung fields but predominantly upper lobes. Echocardiogram showed normal left ventricular ejection fraction of 65%.  She was tx with abx and mild diruesis.  Following diuresis the patient had bronchoscopy on April 23 with a BAL showing no organisms, fluid was described as red and bloody with rare WBCs, 100% polys.  She was tx with abx and prednisone.  The patient did not require oxygen at the time of discharge.  I saw her in f/u in May and she was using flovent twice daily as well as as needed EDER via nebulizer. She had been told that she had an abnormal lung exam in the past and was given rescue inhaler although outside of a PNA in 11/2018 and her hospital stay in April, she denied chronic respiratory sxs. PFTs showed bronchodilator responsiveness but otherwise normal from Jan 2019.  We repeated a CT chest in June which showed near resolution of her GGO despite a persistently abnormal exam but overall sxs were improved.  She began having increased cough and chest congestion in early September and  had stopped flovent so  I recommended she resume flovent and her SaO2 began to drop so we saw her for sick visit 9/18 when she had markedly abnormal exam and SaO2 was 86% on RA at rest.  I admitted her to the hospital where CT was not obtained due to malfunction of machine and the presumptive dx was aspiration pna in pt with prior Kennedy-en-Y.  She was d/c'd with supplemental O2, abx but NO steroids this time and recommended to f/u with bariatric surgery for possible revision.  She had f/u HRCT chest which showed some areas of improvement and some new small areas of GGO. I requested earlier f/u with me however pt required repeat admission for worsening SOB on 10/13 at which pt a CTA was done and although different protocol, appeared to have increasing GGO mainly peripheral in location since 9/27 but not as severe as CT from April.  She underwent bronchoscopy again which showed no e/o infx. Cells were mainly lymphocytes that time, no transbronchial bx performed.  She was tx with higher dose steroids and had improved sxs at f/u with me 10/17.  Repeat PFts at that time showed mild restriction of airflows, normal TLC, mild air trapping and normal DLCO. We sent expanded CTD serologies all of which have been negative and referred her to [facility] ILD program while she was getting bariatric eval. She has undergone EGD, pH testing and manometry in January all of which are negative other than some reflux without drop in pH.  She was subsequently seen by Dr. Castillo to evaluate for reversal of bariatric procedure which he does not feel is necessary at this time given essentially unremarkable GI work-up.  Since her last appointment she has been seen at the ILD clinic at [facility] where it was felt her symptoms were likely related again to aspiration in the setting of reflux versus cardiac in etiology.  Recommendation was also made to consider hematology consult given persistently elevated hemoglobin and white blood cell count.  We  did repeat a BNP as she has had elevations in her BNP that were mild during previous admissions and repeat NT proBNP is normal. At our last visit we obtained overnight oximetry on 2 LPM which showed no desaturation to explain erythrocytosis.     She presents today for routine f/u.   She was walking daily and using flovent with reasonable symptom control.  Today she tells me she is coughing every few hours to clear mucous in her throat despite flonase.  She is also taking PPI.  She has expiratory rales b/l bases of her lungs in addition to squeaks in upper lung fields nearly always present.  No f/c. No nt sweats. SaO2 wnls.     Past Medical History:   Diagnosis Date   • Asthma    • Diabetes (HCC)    • Hyperlipidemia    • Hypertension    • Thyroid disease        Social History     Socioeconomic History   • Marital status:      Spouse name: Not on file   • Number of children: Not on file   • Years of education: Not on file   • Highest education level: Not on file   Occupational History   • Not on file   Social Needs   • Financial resource strain: Not on file   • Food insecurity     Worry: Not on file     Inability: Not on file   • Transportation needs     Medical: Not on file     Non-medical: Not on file   Tobacco Use   • Smoking status: Former Smoker     Packs/day: 1.00     Years: 20.00     Pack years: 20.00     Types: Cigarettes     Last attempt to quit: 1994     Years since quittin.3   • Smokeless tobacco: Never Used   Substance and Sexual Activity   • Alcohol use: Yes     Alcohol/week: 0.6 - 1.2 oz     Types: 1 - 2 Shots of liquor per week     Frequency: Never     Drinks per session: 1 or 2     Binge frequency: Never     Comment: 1-2x per week, 1-2 drinks   • Drug use: Not Currently     Types: Marijuana, Oral     Comment: edibled   • Sexual activity: Not on file   Lifestyle   • Physical activity     Days per week: Not on file     Minutes per session: Not on file   • Stress: Not on file    Relationships   • Social connections     Talks on phone: Not on file     Gets together: Not on file     Attends Anabaptism service: Not on file     Active member of club or organization: Not on file     Attends meetings of clubs or organizations: Not on file     Relationship status: Not on file   • Intimate partner violence     Fear of current or ex partner: Not on file     Emotionally abused: Not on file     Physically abused: Not on file     Forced sexual activity: Not on file   Other Topics Concern   • Not on file   Social History Narrative   • Not on file       Family History   Problem Relation Age of Onset   • Cancer Mother 55        breast   • Heart Disease Mother         MI   • Cancer Maternal Grandmother         breast   • Cancer Sister         breast   • Diabetes Father    • Cancer Father         SCC lung       Current Outpatient Medications on File Prior to Visit   Medication Sig Dispense Refill   • Multiple Vitamins-Minerals (ZINC PO) Take 35 mg by mouth every day.     • levothyroxine (SYNTHROID) 88 MCG Tab Take 1 Tab by mouth Every morning on an empty stomach. 90 Tab 1   • omeprazole (PRILOSEC) 40 MG delayed-release capsule TK 1 C PO QD 30 MIN B JASSON MEAL     • Coenzyme Q10 (COQ-10 PO) Take  by mouth.     • ibuprofen (MOTRIN) 200 MG Tab Take 400 mg by mouth every 6 hours as needed for Mild Pain.     • CALCIUM PO Take 1 Tab by mouth every day.     • IRON PO Take 1 Tab by mouth every evening.     • Ascorbic Acid (VITAMIN C PO) Take 1 Tab by mouth every evening.     • albuterol 108 (90 Base) MCG/ACT Aero Soln inhalation aerosol Inhale 2 Puffs by mouth every 6 hours as needed for Shortness of Breath.     • Omega-3 Fatty Acids (FISH OIL) 1000 MG Cap capsule Take 1,000 mg by mouth every day.     • vitamin D (CHOLECALCIFEROL) 1000 UNIT Tab Take 1,000 Units by mouth every day.     • therapeutic multivitamin-minerals (THERAGRAN-M) Tab Take 1 Tab by mouth every day.     • magnesium gluconate (MAG-G) 500 MG tablet  "Take 500 mg by mouth every day.     • cyanocobalamin (VITAMIN B-12) 500 MCG Tab Take 500 mcg by mouth every day.     • lisinopril (PRINIVIL) 20 MG TABS Take 20 mg by mouth every day.     • Probiotic Product (PROBIOTIC DAILY PO) Take  by mouth.     • Dexlansoprazole (DEXILANT) 60 MG CAPSULE DELAYED RELEASE delayed-release capsule Take 60 mg by mouth every day.       No current facility-administered medications on file prior to visit.        Codeine and Sulfa drugs      ROS:   Review of Systems   Constitutional: Negative for chills, diaphoresis, fever, malaise/fatigue and weight loss.   HENT: Positive for congestion. Negative for ear discharge, ear pain, hearing loss, nosebleeds, sinus pain, sore throat and tinnitus.    Eyes: Negative for blurred vision, double vision, photophobia, pain, discharge and redness.   Respiratory: Positive for cough and sputum production. Negative for hemoptysis, shortness of breath, wheezing and stridor.    Cardiovascular: Negative for chest pain, palpitations, orthopnea, claudication, leg swelling and PND.   Gastrointestinal: Negative for abdominal pain, constipation, diarrhea, heartburn, nausea and vomiting.   Genitourinary: Negative for dysuria and urgency.   Musculoskeletal: Negative for back pain, falls, joint pain, myalgias and neck pain.   Skin: Negative for itching and rash.   Neurological: Negative for dizziness, tremors, speech change, focal weakness, weakness and headaches.   Endo/Heme/Allergies: Negative for environmental allergies.   Psychiatric/Behavioral: Negative for depression.       /92 (BP Location: Left arm, Patient Position: Sitting, BP Cuff Size: Adult)   Pulse 98   Temp 36.9 °C (98.4 °F) (Temporal)   Resp 16   Ht 1.753 m (5' 9\")   Wt 81.2 kg (179 lb)   SpO2 96%   Physical Exam  Constitutional:       General: She is not in acute distress.     Appearance: Normal appearance. She is well-developed and normal weight.   HENT:      Head: Normocephalic and " atraumatic.      Right Ear: External ear normal.      Left Ear: External ear normal.      Nose: Nose normal. No congestion.      Mouth/Throat:      Mouth: Mucous membranes are moist.      Pharynx: Oropharynx is clear. No oropharyngeal exudate.   Eyes:      General: No scleral icterus.     Extraocular Movements: Extraocular movements intact.      Conjunctiva/sclera: Conjunctivae normal.      Pupils: Pupils are equal, round, and reactive to light.   Neck:      Musculoskeletal: Neck supple.      Vascular: No JVD.      Trachea: No tracheal deviation.   Cardiovascular:      Rate and Rhythm: Normal rate and regular rhythm.      Heart sounds: Normal heart sounds. No murmur. No friction rub. No gallop.    Pulmonary:      Effort: Pulmonary effort is normal. No accessory muscle usage or respiratory distress.      Breath sounds: No wheezing or rales.      Comments: Inspiratory squeaks b/l upper lobes L>R, expiratory rales b/l bases  Abdominal:      General: There is no distension.      Palpations: Abdomen is soft.      Tenderness: There is no abdominal tenderness.   Musculoskeletal: Normal range of motion.         General: No tenderness or deformity.      Right lower leg: No edema.      Left lower leg: No edema.   Lymphadenopathy:      Cervical: No cervical adenopathy.   Skin:     General: Skin is warm and dry.      Findings: No rash.      Nails: There is no clubbing.     Neurological:      Mental Status: She is alert and oriented to person, place, and time.      Cranial Nerves: No cranial nerve deficit.      Gait: Gait normal.   Psychiatric:         Behavior: Behavior normal.         PFTs as reviewed by me personally:as per HPI    Imagaing as reviewed by me personally:  As per HPI    Assessment:  1. Lung field abnormal finding on examination  CT-CHEST (THORAX) W/O   2. Cough  fluticasone (FLOVENT HFA) 220 MCG/ACT Aerosol    montelukast (SINGULAIR) 10 MG Tab   3. History of Kennedy-en-Y gastric bypass     4. Asthma, unspecified  asthma severity, unspecified whether complicated, unspecified whether persistent         Plan:  1. Given pt hx and abnormal lung findings, I am concerned we are having another respiratory episode and have ordered CT chest. I am still concerned for aspiration which is the most likely source at this point.   2. See above. I am still concerned for retrograde aspiration in pt with former Kennedy-en-Y. We are tx for GERD and PND. She did not tolerate LAMA/LABA combination therapy in the past but we will increase her flovent dose and start singulair. Ct as per above.  3. See discussion above. We may need to send her back to bariatric surgery.  4. Exam is not c/w simple asthma and her imaging in the past suggests this is not simple asthma however we will increase flovent and start singulair. Pt has f/u in August but pending sxs and imaging we may need to see her sooner.   Return in about 3 months (around 8/26/2020) for ct chest.

## 2020-06-02 ENCOUNTER — HOSPITAL ENCOUNTER (OUTPATIENT)
Dept: RADIOLOGY | Facility: MEDICAL CENTER | Age: 60
End: 2020-06-02
Attending: INTERNAL MEDICINE
Payer: COMMERCIAL

## 2020-06-02 DIAGNOSIS — R91.8 LUNG FIELD ABNORMAL FINDING ON EXAMINATION: ICD-10-CM

## 2020-06-02 PROCEDURE — 71250 CT THORAX DX C-: CPT

## 2020-06-03 RX ORDER — LEVOTHYROXINE SODIUM 88 UG/1
TABLET ORAL
Qty: 90 TAB | Refills: 1 | Status: SHIPPED | OUTPATIENT
Start: 2020-06-03 | End: 2020-12-18 | Stop reason: SDUPTHER

## 2020-06-03 NOTE — TELEPHONE ENCOUNTER
Received request via: Pharmacy    Was the patient seen in the last year in this department? Yes 1/13/2020    Does the patient have an active prescription (recently filled or refills available) for medication(s) requested? No

## 2020-06-08 ENCOUNTER — TELEPHONE (OUTPATIENT)
Dept: PULMONOLOGY | Facility: HOSPICE | Age: 60
End: 2020-06-08

## 2020-06-08 NOTE — TELEPHONE ENCOUNTER
----- Message from Irais Pitt M.D. sent at 6/8/2020 10:54 AM PDT -----  Reviewed patient's CT.  No evidence of recurrent groundglass other than some mild findings in the left lower lobe.  Routine follow-up unless patient has increasing or worsening symptoms.

## 2020-07-22 ENCOUNTER — HOSPITAL ENCOUNTER (OUTPATIENT)
Dept: LAB | Facility: MEDICAL CENTER | Age: 60
End: 2020-07-22
Payer: COMMERCIAL

## 2020-07-24 LAB
SARS-COV-2 RNA RESP QL NAA+PROBE: NOTDETECTED
SPECIMEN SOURCE: NORMAL

## 2020-08-24 ENCOUNTER — OFFICE VISIT (OUTPATIENT)
Dept: PULMONOLOGY | Facility: HOSPICE | Age: 60
End: 2020-08-24
Payer: COMMERCIAL

## 2020-08-24 VITALS
OXYGEN SATURATION: 92 % | HEIGHT: 69 IN | WEIGHT: 178 LBS | BODY MASS INDEX: 26.36 KG/M2 | RESPIRATION RATE: 16 BRPM | HEART RATE: 74 BPM | TEMPERATURE: 98.2 F | SYSTOLIC BLOOD PRESSURE: 114 MMHG | DIASTOLIC BLOOD PRESSURE: 76 MMHG

## 2020-08-24 DIAGNOSIS — Z98.84 HISTORY OF ROUX-EN-Y GASTRIC BYPASS: ICD-10-CM

## 2020-08-24 DIAGNOSIS — R91.8 LUNG FIELD ABNORMAL FINDING ON EXAMINATION: ICD-10-CM

## 2020-08-24 DIAGNOSIS — J96.11 CHRONIC RESPIRATORY FAILURE WITH HYPOXIA (HCC): ICD-10-CM

## 2020-08-24 DIAGNOSIS — J45.909 ASTHMA, UNSPECIFIED ASTHMA SEVERITY, UNSPECIFIED WHETHER COMPLICATED, UNSPECIFIED WHETHER PERSISTENT: ICD-10-CM

## 2020-08-24 PROCEDURE — 99213 OFFICE O/P EST LOW 20 MIN: CPT | Performed by: INTERNAL MEDICINE

## 2020-08-24 ASSESSMENT — ENCOUNTER SYMPTOMS
PND: 0
WEIGHT LOSS: 0
COUGH: 0
HEARTBURN: 0
PHOTOPHOBIA: 0
DOUBLE VISION: 0
DEPRESSION: 0
SINUS PAIN: 0
SPUTUM PRODUCTION: 0
FOCAL WEAKNESS: 0
EYE PAIN: 0
DIAPHORESIS: 0
ABDOMINAL PAIN: 0
BACK PAIN: 0
PALPITATIONS: 0
VOMITING: 0
EYE REDNESS: 0
SHORTNESS OF BREATH: 0
SPEECH CHANGE: 0
FALLS: 0
BLURRED VISION: 0
DIZZINESS: 0
DIARRHEA: 0
HEMOPTYSIS: 0
FEVER: 0
MYALGIAS: 0
ORTHOPNEA: 0
CONSTIPATION: 0
NAUSEA: 0
CHILLS: 0
CLAUDICATION: 0
WEAKNESS: 0
EYE DISCHARGE: 0
WHEEZING: 0
TREMORS: 0
HEADACHES: 0
STRIDOR: 0
SORE THROAT: 0
NECK PAIN: 0

## 2020-08-24 ASSESSMENT — FIBROSIS 4 INDEX: FIB4 SCORE: 0.83

## 2020-08-24 NOTE — PROGRESS NOTES
Chief Complaint   Patient presents with   • Follow-Up     last seen 5/26/2020    • Results     Ct Thorax 6/3/2020          HPI: This patient is a 60 y.o. female whom is followed in our clinic for recurrent episodes of acute respiratory failure and RAD last seen by me on 5/26/20.  The patient's past medical history is significant for hypertension, prediabetes currently controlled with diet, history of obesity treated with Kennedy-en-Y gastric bypass.  The patient has had intermittent episodes of subacute onset hypoxic respiratory failure since April of last year during which she required ICU admission with acute respiratory failure but did not require intubation. Work up has included serial imaging which has never definitively shown ILD although her interstitial process appears to be responsive to prednisone, bronchoscopy which was unrevealing and more recently evaluation at [facility] in ILD clinic where it was felt she was likely having recurrent aspiration events. Please see my last clinic note for complete details 5/26/20. She has been on supplemental O2 at night only now for the last 6 months and actually was able to wean herself off in the past 6-8 weeks. She is taking flovent for RAD component and has been walking daily and recently traveled to [place] to see family. She has always had an abnormal pulmonary exam and I ordered a repeat CT chest at our last clinic visit which showed improved GGO compared to prior and some mild bronchiectasis. She denies chronic cough or current SOB. No new concerns today. Last PFTs showed mild restriction on air flows with FVC of 66% pred and FEV! Of 74% predicted with no BD response, normal TLC and normal DLCO.     Past Medical History:   Diagnosis Date   • Asthma    • Diabetes (HCC)    • Hyperlipidemia    • Hypertension    • Thyroid disease        Social History     Socioeconomic History   • Marital status:      Spouse name: Not on file   • Number of children: Not on file    • Years of education: Not on file   • Highest education level: Not on file   Occupational History   • Not on file   Social Needs   • Financial resource strain: Not on file   • Food insecurity     Worry: Not on file     Inability: Not on file   • Transportation needs     Medical: Not on file     Non-medical: Not on file   Tobacco Use   • Smoking status: Former Smoker     Packs/day: 1.00     Years: 20.00     Pack years: 20.00     Types: Cigarettes     Quit date: 1994     Years since quittin.6   • Smokeless tobacco: Never Used   Substance and Sexual Activity   • Alcohol use: Yes     Alcohol/week: 0.6 - 1.2 oz     Types: 1 - 2 Shots of liquor per week     Frequency: Never     Drinks per session: 1 or 2     Binge frequency: Never     Comment: 1-2x per week, 1-2 drinks   • Drug use: Not Currently     Types: Marijuana, Oral     Comment: edibled   • Sexual activity: Not on file   Lifestyle   • Physical activity     Days per week: Not on file     Minutes per session: Not on file   • Stress: Not on file   Relationships   • Social connections     Talks on phone: Not on file     Gets together: Not on file     Attends Judaism service: Not on file     Active member of club or organization: Not on file     Attends meetings of clubs or organizations: Not on file     Relationship status: Not on file   • Intimate partner violence     Fear of current or ex partner: Not on file     Emotionally abused: Not on file     Physically abused: Not on file     Forced sexual activity: Not on file   Other Topics Concern   • Not on file   Social History Narrative   • Not on file       Family History   Problem Relation Age of Onset   • Cancer Mother 55        breast   • Heart Disease Mother         MI   • Cancer Maternal Grandmother         breast   • Cancer Sister         breast   • Diabetes Father    • Cancer Father         SCC lung       Current Outpatient Medications on File Prior to Visit   Medication Sig Dispense Refill   •  levothyroxine (SYNTHROID) 88 MCG Tab TAKE 1 TABLET BY MOUTH EVERY MORNING ON AN EMPTY STOMACH 90 Tab 1   • Multiple Vitamins-Minerals (ZINC PO) Take 35 mg by mouth every day.     • fluticasone (FLOVENT HFA) 220 MCG/ACT Aerosol Inhale 2 Puffs by mouth 2 times a day. Use with spacer.  Rinse mouth after each use. 1 Inhaler 11   • montelukast (SINGULAIR) 10 MG Tab Take 1 Tab by mouth every bedtime. 30 Tab 11   • omeprazole (PRILOSEC) 40 MG delayed-release capsule TK 1 C PO QD 30 MIN B JASSON MEAL     • Coenzyme Q10 (COQ-10 PO) Take  by mouth.     • ibuprofen (MOTRIN) 200 MG Tab Take 400 mg by mouth every 6 hours as needed for Mild Pain.     • CALCIUM PO Take 1 Tab by mouth every day.     • IRON PO Take 1 Tab by mouth every evening.     • Ascorbic Acid (VITAMIN C PO) Take 1 Tab by mouth every evening.     • albuterol 108 (90 Base) MCG/ACT Aero Soln inhalation aerosol Inhale 2 Puffs by mouth every 6 hours as needed for Shortness of Breath.     • Omega-3 Fatty Acids (FISH OIL) 1000 MG Cap capsule Take 1,000 mg by mouth every day.     • vitamin D (CHOLECALCIFEROL) 1000 UNIT Tab Take 1,000 Units by mouth every day.     • therapeutic multivitamin-minerals (THERAGRAN-M) Tab Take 1 Tab by mouth every day.     • magnesium gluconate (MAG-G) 500 MG tablet Take 500 mg by mouth every day.     • cyanocobalamin (VITAMIN B-12) 500 MCG Tab Take 500 mcg by mouth every day.     • lisinopril (PRINIVIL) 20 MG TABS Take 20 mg by mouth every day.     • Probiotic Product (PROBIOTIC DAILY PO) Take  by mouth.     • Dexlansoprazole (DEXILANT) 60 MG CAPSULE DELAYED RELEASE delayed-release capsule Take 60 mg by mouth every day.       No current facility-administered medications on file prior to visit.        Codeine and Sulfa drugs      ROS:   Review of Systems   Constitutional: Negative for chills, diaphoresis, fever, malaise/fatigue and weight loss.   HENT: Negative for congestion, ear discharge, ear pain, hearing loss, nosebleeds, sinus pain, sore  "throat and tinnitus.    Eyes: Negative for blurred vision, double vision, photophobia, pain, discharge and redness.   Respiratory: Negative for cough, hemoptysis, sputum production, shortness of breath, wheezing and stridor.    Cardiovascular: Negative for chest pain, palpitations, orthopnea, claudication, leg swelling and PND.   Gastrointestinal: Negative for abdominal pain, constipation, diarrhea, heartburn, nausea and vomiting.   Genitourinary: Negative for dysuria and urgency.   Musculoskeletal: Negative for back pain, falls, joint pain, myalgias and neck pain.   Skin: Negative for itching and rash.   Neurological: Negative for dizziness, tremors, speech change, focal weakness, weakness and headaches.   Endo/Heme/Allergies: Negative for environmental allergies.   Psychiatric/Behavioral: Negative for depression.       /76 (BP Location: Left arm, Patient Position: Sitting, BP Cuff Size: Adult)   Pulse 74   Temp 36.8 °C (98.2 °F) (Temporal)   Resp 16   Ht 1.753 m (5' 9\")   Wt 80.7 kg (178 lb)   SpO2 92%   Physical Exam  Constitutional:       General: She is not in acute distress.     Appearance: Normal appearance. She is well-developed and normal weight.   HENT:      Head: Normocephalic and atraumatic.      Right Ear: External ear normal.      Left Ear: External ear normal.      Nose: Nose normal. No congestion.      Mouth/Throat:      Mouth: Mucous membranes are moist.      Pharynx: Oropharynx is clear. No oropharyngeal exudate.   Eyes:      General: No scleral icterus.     Extraocular Movements: Extraocular movements intact.      Conjunctiva/sclera: Conjunctivae normal.      Pupils: Pupils are equal, round, and reactive to light.   Neck:      Musculoskeletal: Normal range of motion and neck supple.      Vascular: No JVD.      Trachea: No tracheal deviation.   Cardiovascular:      Rate and Rhythm: Normal rate and regular rhythm.      Heart sounds: Normal heart sounds. No murmur. No friction rub. No " gallop.    Pulmonary:      Effort: No accessory muscle usage or respiratory distress.      Breath sounds: No wheezing or rales.      Comments: Inspiratory squeaks and crackle b/l mid and lower lung fields.  Abdominal:      General: There is no distension.      Palpations: Abdomen is soft.      Tenderness: There is no abdominal tenderness.   Musculoskeletal: Normal range of motion.         General: No tenderness or deformity.      Right lower leg: No edema.      Left lower leg: No edema.   Lymphadenopathy:      Cervical: No cervical adenopathy.   Skin:     General: Skin is warm and dry.      Findings: No rash.      Nails: There is no clubbing.     Neurological:      Mental Status: She is alert and oriented to person, place, and time.      Cranial Nerves: No cranial nerve deficit.      Gait: Gait normal.   Psychiatric:         Mood and Affect: Mood normal.         Behavior: Behavior normal.         PFTs as reviewed by me personally: as per hPI    Imaging as reviewed by me personally:  As per hPI    Assessment:  1. Lung field abnormal finding on examination     2. History of Kennedy-en-Y gastric bypass     3. Asthma, unspecified asthma severity, unspecified whether complicated, unspecified whether persistent     4. Chronic respiratory failure with hypoxia (HCC)         Plan:  1. I suppose this could all be related to small airways disease in a pt with hx of ALI/ARDS in 4/2019 vs. Bronchiectasis. Overall O2 is wnls and her CT was improved with no sxs for active infection today. Continue observation and f/u in 4 mos or sooner if sxs develop.  2. This has always been a concern given risk for retrograde aspiration. She has had GI w/u and seen by her bariatric surgery but ultimately this was not felt to be contributing significantly by GI/surgery.  3. I am not sure if Lillian truly has RAD but she seemed to do better with ICS over the course of the year. We will continue for now and if she remains asx, we can consider  tapering off.  4. She is off oxygen currently and we are continuing to monitor. SaO2 wnls and CT chest showed improving GGO. Last DLCO wnls. F/U 4 mos or sooner for sxs.   Return in about 4 months (around 12/24/2020) for follow up.

## 2020-09-04 ENCOUNTER — PATIENT MESSAGE (OUTPATIENT)
Dept: MEDICAL GROUP | Facility: MEDICAL CENTER | Age: 60
End: 2020-09-04

## 2020-09-04 RX ORDER — LISINOPRIL 20 MG/1
20 TABLET ORAL DAILY
Qty: 30 TAB | Refills: 5 | Status: SHIPPED | OUTPATIENT
Start: 2020-09-04 | End: 2021-01-26 | Stop reason: SDUPTHER

## 2020-10-01 DIAGNOSIS — F51.01 PRIMARY INSOMNIA: ICD-10-CM

## 2020-10-01 RX ORDER — ZALEPLON 10 MG/1
10 CAPSULE ORAL NIGHTLY
COMMUNITY
End: 2020-10-01 | Stop reason: SDUPTHER

## 2020-10-01 RX ORDER — ZALEPLON 10 MG/1
10 CAPSULE ORAL NIGHTLY PRN
Qty: 30 CAP | Refills: 0 | Status: SHIPPED | OUTPATIENT
Start: 2020-10-01 | End: 2020-10-31

## 2020-10-15 ENCOUNTER — HOSPITAL ENCOUNTER (OUTPATIENT)
Dept: LAB | Facility: MEDICAL CENTER | Age: 60
End: 2020-10-15
Payer: COMMERCIAL

## 2020-10-15 LAB — COVID ORDER STATUS COVID19: NORMAL

## 2020-10-16 LAB
SARS-COV-2 RNA RESP QL NAA+PROBE: NOTDETECTED
SPECIMEN SOURCE: NORMAL

## 2020-12-15 ENCOUNTER — OFFICE VISIT (OUTPATIENT)
Dept: SLEEP MEDICINE | Facility: MEDICAL CENTER | Age: 60
End: 2020-12-15
Payer: COMMERCIAL

## 2020-12-15 VITALS
OXYGEN SATURATION: 98 % | RESPIRATION RATE: 16 BRPM | HEART RATE: 75 BPM | DIASTOLIC BLOOD PRESSURE: 80 MMHG | BODY MASS INDEX: 26.36 KG/M2 | HEIGHT: 69 IN | WEIGHT: 178 LBS | TEMPERATURE: 97.9 F | SYSTOLIC BLOOD PRESSURE: 148 MMHG

## 2020-12-15 DIAGNOSIS — J84.9 ILD (INTERSTITIAL LUNG DISEASE) (HCC): ICD-10-CM

## 2020-12-15 DIAGNOSIS — J45.909 MILD REACTIVE AIRWAYS DISEASE, UNSPECIFIED WHETHER PERSISTENT: ICD-10-CM

## 2020-12-15 DIAGNOSIS — R09.82 PND (POST-NASAL DRIP): ICD-10-CM

## 2020-12-15 DIAGNOSIS — J96.01 ACUTE RESPIRATORY FAILURE WITH HYPOXIA (HCC): ICD-10-CM

## 2020-12-15 DIAGNOSIS — K21.9 CHRONIC GERD: ICD-10-CM

## 2020-12-15 PROCEDURE — 99214 OFFICE O/P EST MOD 30 MIN: CPT | Performed by: INTERNAL MEDICINE

## 2020-12-15 ASSESSMENT — ENCOUNTER SYMPTOMS
CONSTIPATION: 0
STRIDOR: 0
BLURRED VISION: 0
NAUSEA: 0
DIARRHEA: 0
TREMORS: 0
PALPITATIONS: 0
FOCAL WEAKNESS: 0
WHEEZING: 0
BACK PAIN: 0
PND: 0
HEADACHES: 0
SORE THROAT: 0
FEVER: 0
WEIGHT LOSS: 0
SPEECH CHANGE: 0
DIAPHORESIS: 0
VOMITING: 0
SPUTUM PRODUCTION: 0
EYE DISCHARGE: 0
COUGH: 0
PHOTOPHOBIA: 0
DOUBLE VISION: 0
HEMOPTYSIS: 0
DEPRESSION: 0
NECK PAIN: 0
CLAUDICATION: 0
WEAKNESS: 0
EYE REDNESS: 0
MYALGIAS: 0
SINUS PAIN: 0
ORTHOPNEA: 0
SHORTNESS OF BREATH: 0
EYE PAIN: 0
CHILLS: 0
DIZZINESS: 0
FALLS: 0
HEARTBURN: 0
ABDOMINAL PAIN: 0

## 2020-12-15 ASSESSMENT — FIBROSIS 4 INDEX: FIB4 SCORE: 0.83

## 2020-12-16 NOTE — PROGRESS NOTES
Chief Complaint   Patient presents with   • Follow-Up     last seen 8/24/2020          HPI: This patient is a 60 y.o. female whom is followed in our clinic for recurrent episodes of respiratory failure and rAD last seen by me on 8/24/20. The patient's past medical history is significant for hypertension, prediabetes currently controlled with diet, history of obesity treated with Kennedy-en-Y gastric bypass.  The patient has had intermittent episodes of subacute onset hypoxic respiratory failure since April of last year during which she required ICU admission with acute respiratory failure but did not require intubation. Work up has included serial imaging which has never definitively shown ILD although her interstitial process appears to be responsive to prednisone, bronchoscopy which was unrevealing and more recently evaluation at [facility] in ILD clinic where it was felt she was likely having recurrent aspiration events. She did require supplemental O2 for some time but has been off now including at night for roughly 5 months. She is on flovent and intranasal steroids for RAD and PND respectively. PFts from 10/2019 The patient's past medical history is significant for hypertension, prediabetes currently controlled with diet, history of obesity treated with Kennedy-en-Y gastric bypass.  The patient has had intermittent episodes of subacute onset hypoxic respiratory failure since April of last year during which she required ICU admission with acute respiratory failure but did not require intubation. Work up has included serial imaging which has never definitively shown ILD although her interstitial process appears to be responsive to prednisone, bronchoscopy which was unrevealing and more recently evaluation at [facility] in ILD clinic where it was felt she was likely having recurrent aspiration events. PFTs form 10/2019 showed mild restriction on air flows with FVC of 66% pred and FEV! Of 74% predicted with no BD response,  normal TLC and normal DLCO. Her last CT chest from  showed improved GGO with small residual GGO in RLL. She has done well for the last 4 mos. Her only concern today is loss of smell/taste. This has been off and on for several months and she tested negative for corona virus x2 now.     Past Medical History:   Diagnosis Date   • Asthma    • Diabetes (HCC)    • Hyperlipidemia    • Hypertension    • Thyroid disease        Social History     Socioeconomic History   • Marital status:      Spouse name: Not on file   • Number of children: Not on file   • Years of education: Not on file   • Highest education level: Not on file   Occupational History   • Not on file   Social Needs   • Financial resource strain: Not on file   • Food insecurity     Worry: Not on file     Inability: Not on file   • Transportation needs     Medical: Not on file     Non-medical: Not on file   Tobacco Use   • Smoking status: Former Smoker     Packs/day: 1.00     Years: 20.00     Pack years: 20.00     Types: Cigarettes     Quit date: 1994     Years since quittin.9   • Smokeless tobacco: Never Used   Substance and Sexual Activity   • Alcohol use: Yes     Alcohol/week: 0.6 - 1.2 oz     Types: 1 - 2 Shots of liquor per week     Frequency: Never     Drinks per session: 1 or 2     Binge frequency: Never     Comment: 1-2x per week, 1-2 drinks   • Drug use: Not Currently     Types: Marijuana, Oral     Comment: edibled   • Sexual activity: Not on file   Lifestyle   • Physical activity     Days per week: Not on file     Minutes per session: Not on file   • Stress: Not on file   Relationships   • Social connections     Talks on phone: Not on file     Gets together: Not on file     Attends [employer] service: Not on file     Active member of club or organization: Not on file     Attends meetings of clubs or organizations: Not on file     Relationship status: Not on file   • Intimate partner violence     Fear of current or ex partner: Not on  file     Emotionally abused: Not on file     Physically abused: Not on file     Forced sexual activity: Not on file   Other Topics Concern   • Not on file   Social History Narrative   • Not on file       Family History   Problem Relation Age of Onset   • Cancer Mother 55        breast   • Heart Disease Mother         MI   • Cancer Maternal Grandmother         breast   • Cancer Sister         breast   • Diabetes Father    • Cancer Father         SCC lung       Current Outpatient Medications on File Prior to Visit   Medication Sig Dispense Refill   • omeprazole (PRILOSEC) 40 MG delayed-release capsule Take 1 Cap by mouth every day. 90 Cap 1   • lisinopril (PRINIVIL) 20 MG Tab Take 1 Tab by mouth every day. 30 Tab 5   • levothyroxine (SYNTHROID) 88 MCG Tab TAKE 1 TABLET BY MOUTH EVERY MORNING ON AN EMPTY STOMACH 90 Tab 1   • Multiple Vitamins-Minerals (ZINC PO) Take 35 mg by mouth every day.     • fluticasone (FLOVENT HFA) 220 MCG/ACT Aerosol Inhale 2 Puffs by mouth 2 times a day. Use with spacer.  Rinse mouth after each use. 1 Inhaler 11   • montelukast (SINGULAIR) 10 MG Tab Take 1 Tab by mouth every bedtime. 30 Tab 11   • Coenzyme Q10 (COQ-10 PO) Take  by mouth.     • ibuprofen (MOTRIN) 200 MG Tab Take 400 mg by mouth every 6 hours as needed for Mild Pain.     • CALCIUM PO Take 1 Tab by mouth every day.     • IRON PO Take 1 Tab by mouth every evening.     • Ascorbic Acid (VITAMIN C PO) Take 1 Tab by mouth every evening.     • albuterol 108 (90 Base) MCG/ACT Aero Soln inhalation aerosol Inhale 2 Puffs by mouth every 6 hours as needed for Shortness of Breath.     • Omega-3 Fatty Acids (FISH OIL) 1000 MG Cap capsule Take 1,000 mg by mouth every day.     • vitamin D (CHOLECALCIFEROL) 1000 UNIT Tab Take 1,000 Units by mouth every day.     • therapeutic multivitamin-minerals (THERAGRAN-M) Tab Take 1 Tab by mouth every day.     • magnesium gluconate (MAG-G) 500 MG tablet Take 500 mg by mouth every day.     • cyanocobalamin  "(VITAMIN B-12) 500 MCG Tab Take 500 mcg by mouth every day.     • Probiotic Product (PROBIOTIC DAILY PO) Take  by mouth.       No current facility-administered medications on file prior to visit.        Codeine and Sulfa drugs      ROS:   Review of Systems   Constitutional: Negative for chills, diaphoresis, fever, malaise/fatigue and weight loss.   HENT: Negative for congestion, ear discharge, ear pain, hearing loss, nosebleeds, sinus pain, sore throat and tinnitus.    Eyes: Negative for blurred vision, double vision, photophobia, pain, discharge and redness.   Respiratory: Negative for cough, hemoptysis, sputum production, shortness of breath, wheezing and stridor.    Cardiovascular: Negative for chest pain, palpitations, orthopnea, claudication, leg swelling and PND.   Gastrointestinal: Negative for abdominal pain, constipation, diarrhea, heartburn, nausea and vomiting.   Genitourinary: Negative for dysuria and urgency.   Musculoskeletal: Negative for back pain, falls, joint pain, myalgias and neck pain.   Skin: Negative for itching and rash.   Neurological: Negative for dizziness, tremors, speech change, focal weakness, weakness and headaches.   Endo/Heme/Allergies: Negative for environmental allergies.   Psychiatric/Behavioral: Negative for depression.       /80 (BP Location: Right arm, Patient Position: Sitting, BP Cuff Size: Adult)   Pulse 75   Temp 36.6 °C (97.9 °F) (Temporal)   Resp 16   Ht 1.753 m (5' 9\")   Wt 80.7 kg (178 lb)   SpO2 98%   Physical Exam  Constitutional:       General: She is not in acute distress.     Appearance: Normal appearance. She is well-developed and normal weight.   HENT:      Head: Normocephalic and atraumatic.      Right Ear: External ear normal.      Left Ear: External ear normal.      Nose: Nose normal. No congestion.      Mouth/Throat:      Mouth: Mucous membranes are moist.      Pharynx: Oropharynx is clear. No oropharyngeal exudate.   Eyes:      General: No " scleral icterus.     Extraocular Movements: Extraocular movements intact.      Conjunctiva/sclera: Conjunctivae normal.      Pupils: Pupils are equal, round, and reactive to light.   Neck:      Musculoskeletal: Normal range of motion and neck supple.      Vascular: No JVD.      Trachea: No tracheal deviation.   Cardiovascular:      Rate and Rhythm: Normal rate and regular rhythm.      Heart sounds: Normal heart sounds. No murmur. No friction rub. No gallop.    Pulmonary:      Effort: Pulmonary effort is normal. No accessory muscle usage or respiratory distress.      Breath sounds: No wheezing or rales.      Comments: Inspiratory crackles and squeaks anterior and posterior throughout  Abdominal:      General: There is no distension.      Palpations: Abdomen is soft.      Tenderness: There is no abdominal tenderness.   Musculoskeletal: Normal range of motion.         General: No tenderness or deformity.      Right lower leg: No edema.      Left lower leg: No edema.   Lymphadenopathy:      Cervical: No cervical adenopathy.   Skin:     General: Skin is warm and dry.      Findings: No rash.      Nails: There is no clubbing.     Neurological:      Mental Status: She is alert and oriented to person, place, and time.      Cranial Nerves: No cranial nerve deficit.      Gait: Gait normal.   Psychiatric:         Mood and Affect: Mood normal.         Behavior: Behavior normal.         PFTs as reviewed by me personally: as per hPI    Imaging as reviewed by me personally:  As per HPI    Assessment:  1. ILD (interstitial lung disease) ([facility])  PULMONARY FUNCTION TESTS -Test requested: Complete Pulmonary Function Test   2. Chronic GERD     3. PND (post-nasal drip)     4. Mild reactive airways disease, unspecified whether persistent     5. Acute respiratory failure with hypoxia (HCC)         Plan:  1. The pt does not have ILD in the true sense but what we suspect was recurrent episodes of retrograde aspiration w/associated  pneumonitis. She has been stable with no episodes now for greater than 6 mos, is off O2 and on Rx for RAD, GERD and PND. Continue current regimen and f/u with me in 6 mos with PFTs.  2. She has a hx of gastric bypass so GERD w/retrograde aspiration has been a concern and she saw bariatric surgery during her pulmonary w/u. Given improvement, no need for reversal at this point but we will continue PPI and lifestyle modifications.  3. Continue flonase - low susp this is contributing to her sensory deficit but I encouraged her to f/u with PCP ? Neurology.  4. Well controlled on flovent and EDER. Up to date on vaccines.  5. Resolved. See discussion above.   Return in about 6 months (around 6/15/2021) for PFTs.

## 2020-12-18 ENCOUNTER — OFFICE VISIT (OUTPATIENT)
Dept: MEDICAL GROUP | Facility: MEDICAL CENTER | Age: 60
End: 2020-12-18
Payer: COMMERCIAL

## 2020-12-18 VITALS
WEIGHT: 176.37 LBS | DIASTOLIC BLOOD PRESSURE: 74 MMHG | TEMPERATURE: 97.3 F | RESPIRATION RATE: 16 BRPM | OXYGEN SATURATION: 96 % | HEIGHT: 69 IN | BODY MASS INDEX: 26.12 KG/M2 | HEART RATE: 92 BPM | SYSTOLIC BLOOD PRESSURE: 132 MMHG

## 2020-12-18 DIAGNOSIS — I10 ESSENTIAL HYPERTENSION: ICD-10-CM

## 2020-12-18 DIAGNOSIS — E89.0 POSTOPERATIVE HYPOTHYROIDISM: ICD-10-CM

## 2020-12-18 DIAGNOSIS — Z23 NEED FOR VACCINATION: ICD-10-CM

## 2020-12-18 DIAGNOSIS — Z12.31 ENCOUNTER FOR SCREENING MAMMOGRAM FOR BREAST CANCER: ICD-10-CM

## 2020-12-18 DIAGNOSIS — J45.40 MODERATE PERSISTENT ASTHMA WITHOUT COMPLICATION: ICD-10-CM

## 2020-12-18 DIAGNOSIS — K21.9 CHRONIC GERD: ICD-10-CM

## 2020-12-18 DIAGNOSIS — E78.2 MIXED HYPERLIPIDEMIA: ICD-10-CM

## 2020-12-18 DIAGNOSIS — F51.01 PRIMARY INSOMNIA: ICD-10-CM

## 2020-12-18 DIAGNOSIS — E11.9 TYPE 2 DIABETES MELLITUS WITHOUT COMPLICATION, WITHOUT LONG-TERM CURRENT USE OF INSULIN (HCC): ICD-10-CM

## 2020-12-18 DIAGNOSIS — Z90.3 S/P TOTAL GASTRECTOMY AND ROUX-EN-Y ESOPHAGOJEJUNAL ANASTOMOSIS: ICD-10-CM

## 2020-12-18 DIAGNOSIS — Z98.0 S/P TOTAL GASTRECTOMY AND ROUX-EN-Y ESOPHAGOJEJUNAL ANASTOMOSIS: ICD-10-CM

## 2020-12-18 DIAGNOSIS — Z80.3 FAMILY HISTORY OF MALIGNANT NEOPLASM OF BREAST: ICD-10-CM

## 2020-12-18 PROBLEM — Z09 HOSPITAL DISCHARGE FOLLOW-UP: Status: RESOLVED | Noted: 2019-10-16 | Resolved: 2020-12-18

## 2020-12-18 PROCEDURE — 90714 TD VACC NO PRESV 7 YRS+ IM: CPT | Performed by: FAMILY MEDICINE

## 2020-12-18 PROCEDURE — 99214 OFFICE O/P EST MOD 30 MIN: CPT | Mod: 25 | Performed by: FAMILY MEDICINE

## 2020-12-18 PROCEDURE — 90471 IMMUNIZATION ADMIN: CPT | Performed by: FAMILY MEDICINE

## 2020-12-18 RX ORDER — LEVOTHYROXINE SODIUM 88 UG/1
88 TABLET ORAL EVERY MORNING
Qty: 90 TAB | Refills: 3 | Status: SHIPPED | OUTPATIENT
Start: 2020-12-18 | End: 2021-01-15

## 2020-12-18 ASSESSMENT — ENCOUNTER SYMPTOMS
EYE DISCHARGE: 0
FOCAL WEAKNESS: 0
HEMOPTYSIS: 0
INSOMNIA: 1
DIZZINESS: 0
EYE PAIN: 0
HEADACHES: 0
COUGH: 0
NAUSEA: 0
EYE REDNESS: 0
SPUTUM PRODUCTION: 0
SINUS PAIN: 0
PALPITATIONS: 0
FEVER: 0
DEPRESSION: 0
SHORTNESS OF BREATH: 0
NERVOUS/ANXIOUS: 0
CHILLS: 0
VOMITING: 0
SENSORY CHANGE: 0
WHEEZING: 0
WEIGHT LOSS: 0
CONSTIPATION: 0
MYALGIAS: 0
DIARRHEA: 0
ABDOMINAL PAIN: 0

## 2020-12-18 ASSESSMENT — LIFESTYLE VARIABLES: SUBSTANCE_ABUSE: 0

## 2020-12-18 ASSESSMENT — FIBROSIS 4 INDEX: FIB4 SCORE: 0.83

## 2020-12-18 NOTE — ASSESSMENT & PLAN NOTE
Chronic problem for this patient.  New to me.  She reports that previously she was on statins but she had bad reaction to statins and she does not want to take statins now.  She reports that she eats healthy diet and is physically active.    Lab Results   Component Value Date/Time    CHOLSTRLTOT 203 (H) 01/08/2020 0912    TRIGLYCERIDE 110 01/08/2020 0912    HDL 92 01/08/2020 0912    LDL 89 01/08/2020 0912

## 2020-12-18 NOTE — ASSESSMENT & PLAN NOTE
Chronic problem for this patient.  She reports that her last vitamin check was last year.  We will recheck her vitamins.

## 2020-12-18 NOTE — ASSESSMENT & PLAN NOTE
Chronic problem for this patient.  New to me last A1c was 5.9.  She currently is not taking any medications for diabetes.  She has not had retinal exam last year.  Foot exam done today.    Monofilament testing with a 10 gram force: sensation intact: intact bilaterally  Visual Inspection: Feet without maceration, ulcers, fissures.  Pedal pulses: intact bilaterally

## 2020-12-18 NOTE — ASSESSMENT & PLAN NOTE
Chronic problem for this patient.  New to me.  She is using Flovent 2 puffs twice daily and albuterol inhaler as needed.  Reports symptoms are controlled with medication.  She was hospitalized twice in last 2 years which required ICU admission for pneumonia which was found to be aspiration pneumonia that she is following with pulmonology.  She reports that she is due for pulmonary function test.  She is currently not on any oxygen.

## 2020-12-18 NOTE — ASSESSMENT & PLAN NOTE
Chronic problem for this patient.  New to me.  She is taking lisinopril 20 mg once daily.  Denies any side effects with this medication.  Denies any chest pain, palpitations, shortness of breath, lower extremity swelling.  Denies any side effects with this medication.  Her blood pressure today is 132/74.

## 2020-12-18 NOTE — PROGRESS NOTES
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    History of present illness: Lillian Addison is a 60 y.o. female who presented establish care.    Previous primary care physician Lydia Fong  Patient reports that she was in [place] from [id number] and she moved to  in 2011.      Chronic GERD  Chronic problem for this patient.  New to me.  She is taking omeprazole 40 mg once daily.  She reports that she had endoscopy and esophageal manometry which came back negative.  She had concern for interstitial lung disease and she followed with the [facility] and that was found due to retrograde aspiration.  She has history of gastric bypass Kennedy-en-Y.  She followed with bariatric surgeon also.  She has improvement in her symptoms so recommended no reversal.      Essential hypertension  Chronic problem for this patient.  New to me.  She is taking lisinopril 20 mg once daily.  Denies any side effects with this medication.  Denies any chest pain, palpitations, shortness of breath, lower extremity swelling.  Denies any side effects with this medication.  Her blood pressure today is 132/74.    Family history of breast cancer  Patient reports family history of breast cancer in her mother, grandmother and sister she reports that she had genetic testing for BRCA which came back negative.  She is due for her mammogram.    Mixed hyperlipidemia  Chronic problem for this patient.  New to me.  She reports that  previously she was on statins but she had bad reaction to statins and she does not want to take statins now.  She reports that she eats healthy diet and is physically active.    Lab Results   Component Value Date/Time    CHOLSTRLTOT 203 (H) 01/08/2020 0912    TRIGLYCERIDE 110 01/08/2020 0912    HDL 92 01/08/2020 0912    LDL 89 01/08/2020 0912       Moderate persistent asthma  Chronic problem for this patient.  New to me.  She is using Flovent 2 puffs twice daily and albuterol inhaler as needed.  Reports symptoms are controlled with medication.  She was hospitalized twice in last 2 years which required ICU admission for pneumonia which was found to be aspiration pneumonia that she is following with pulmonology.  She reports that she is due for pulmonary function test.  She is currently not on any oxygen.    Postoperative hypothyroidism  Chronic problem for this patient new to me.  She is currently on levothyroxine 88 mcg once daily.  Denies any hair loss, skin changes, weight changes, fatigue.  Her last thyroid function was normal.  She requested refill for this medication.    Primary insomnia  Chronic problem for this patient.  New to me.  She reports that she takes Sonata 10 mg as needed for symptoms.  She reported she had tried melatonin in the past which causes her weird dreams.  She reports that she has of the pills of Sonata medication left at home.    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  She reports that her last vitamin check was last year.  We will recheck her vitamins.    Type 2 diabetes mellitus without complication, without long-term current use of insulin (HCC)  Chronic problem for this patient.  New to me last A1c was 5.9.  She currently is not taking any medications for diabetes.  She has not had retinal exam last year.  Foot exam done today.    Monofilament testing with a 10 gram force: sensation intact: intact bilaterally  Visual  Inspection: Feet without maceration, ulcers, fissures.  Pedal pulses: intact bilaterally      Health Maintenance:   Reports had hepatitis B vaccine in [place].    Review of systems:     Review of Systems   Constitutional: Negative for chills, fever, malaise/fatigue and weight loss.   HENT: Negative for ear discharge, ear pain, hearing loss and sinus pain.    Eyes: Negative for pain, discharge and redness.   Respiratory: Negative for cough, hemoptysis, sputum production, shortness of breath and wheezing.    Cardiovascular: Negative for chest pain, palpitations and leg swelling.   Gastrointestinal: Negative for abdominal pain, constipation, diarrhea, nausea and vomiting.   Genitourinary: Negative for dysuria, frequency and urgency.   Musculoskeletal: Negative for joint pain and myalgias.   Skin: Negative for itching and rash.   Neurological: Negative for dizziness, sensory change, focal weakness and headaches.   Endo/Heme/Allergies: Negative for environmental allergies.   Psychiatric/Behavioral: Negative for depression and substance abuse. The patient has insomnia. The patient is not nervous/anxious.         Past Medical, Surgical and Family History:    Past Medical History:   Diagnosis Date   • Asthma    • Diabetes (HCC)    • Hyperlipidemia    • Hypertension    • Thyroid disease      Past Surgical History:   Procedure Laterality Date   • BRONCHOSCOPY-ENDO  10/15/2019    Procedure: BRONCHOSCOPY;  Surgeon: Rosa Elena Richard M.D.;  Location: [facility];  Service: Pulmonary   • BRONCHOSCOPY-ENDO  4/23/2019    Procedure: BRONCHOSCOPY;  Surgeon: Holly Velasquez M.D.;  Location: [facility];  Service: Pulmonary   • BRONCHOSCOPY/NDL BX  4/23/2019    Procedure: BRONCHOSCOPY, WITH NEEDLE BIOPSY-  TRANSBRONCHIAL BX;  Surgeon: Holly Velasquez M.D.;  Location: [facility];  Service: Pulmonary   • GASTRECTOMY  2006    Rou-En-Y   • PB C-SEC ONLY,PBEV C-SEC     • PRIMARY C SECTION       Family  History   Problem Relation Age of Onset   • Cancer Mother 55        breast   • Heart Disease Mother         MI   • Cancer Maternal Grandmother         breast   • Cancer Sister         breast   • Diabetes Father    • Cancer Father         SCC lung        Social History:    Social History     Tobacco Use   • Smoking status: Former Smoker     Packs/day: 1.00     Years: 20.00     Pack years: 20.00     Types: Cigarettes     Quit date: 1994     Years since quittin.9   • Smokeless tobacco: Never Used   Substance Use Topics   • Alcohol use: Yes     Alcohol/week: 0.6 - 1.2 oz     Types: 1 - 2 Shots of liquor per week     Frequency: Never     Drinks per session: 1 or 2     Binge frequency: Never     Comment: 1-2x per week, 1-2 drinks   • Drug use: Not Currently     Types: Marijuana, Oral     Comment: edibled        Medications and Allergies:     Current Outpatient Medications   Medication Sig Dispense Refill   • levothyroxine (SYNTHROID) 88 MCG Tab Take 1 Tab by mouth every morning. ON A EMPTY STOMACH 90 Tab 3   • omeprazole (PRILOSEC) 40 MG delayed-release capsule Take 1 Cap by mouth every day. 90 Cap 1   • lisinopril (PRINIVIL) 20 MG Tab Take 1 Tab by mouth every day. 30 Tab 5   • Multiple Vitamins-Minerals (ZINC PO) Take 35 mg by mouth every day.     • fluticasone (FLOVENT HFA) 220 MCG/ACT Aerosol Inhale 2 Puffs by mouth 2 times a day. Use with spacer.  Rinse mouth after each use. 1 Inhaler 11   • montelukast (SINGULAIR) 10 MG Tab Take 1 Tab by mouth every bedtime. 30 Tab 11   • Coenzyme Q10 (COQ-10 PO) Take  by mouth.     • Probiotic Product (PROBIOTIC DAILY PO) Take  by mouth.     • ibuprofen (MOTRIN) 200 MG Tab Take 400 mg by mouth every 6 hours as needed for Mild Pain.     • CALCIUM PO Take 1 Tab by mouth every day.     • IRON PO Take 1 Tab by mouth every evening.     • Ascorbic Acid (VITAMIN C PO) Take 1 Tab by mouth every evening.     • albuterol 108 (90 Base) MCG/ACT Aero Soln inhalation aerosol Inhale 2  "Puffs by mouth every 6 hours as needed for Shortness of Breath.     • Omega-3 Fatty Acids (FISH OIL) 1000 MG Cap capsule Take 1,000 mg by mouth every day.     • vitamin D (CHOLECALCIFEROL) 1000 UNIT Tab Take 1,000 Units by mouth every day.     • therapeutic multivitamin-minerals (THERAGRAN-M) Tab Take 1 Tab by mouth every day.     • magnesium gluconate (MAG-G) 500 MG tablet Take 500 mg by mouth every day.     • cyanocobalamin (VITAMIN B-12) 500 MCG Tab Take 500 mcg by mouth every day.       No current facility-administered medications for this visit.         Allergies   Allergen Reactions   • Codeine      Pt reports that it makes her feel weird    • Sulfa Drugs Rash     .       Vitals:    /74 (BP Location: Left arm, Patient Position: Sitting, BP Cuff Size: Adult)   Pulse 92   Temp 36.3 °C (97.3 °F)   Resp 16   Ht 1.753 m (5' 9\")   Wt 80 kg (176 lb 5.9 oz)   SpO2 96%  Body mass index is 26.05 kg/m².    Physical Exam:     Physical Exam   Constitutional: She is well-developed, well-nourished, and in no distress. No distress.   HENT:   Head: Normocephalic and atraumatic.   Right Ear: External ear normal.   Left Ear: External ear normal.   Eyes: Conjunctivae and EOM are normal. Right eye exhibits no discharge. Left eye exhibits no discharge.   Neck: Neck supple. No thyromegaly present.   Cardiovascular: Normal rate, regular rhythm, normal heart sounds and intact distal pulses.   No murmur heard.  Pulmonary/Chest: Effort normal and breath sounds normal. No respiratory distress. She has no wheezes. She has no rales.   Abdominal: Soft. Bowel sounds are normal. She exhibits no distension. There is no abdominal tenderness. There is no guarding.   Musculoskeletal:         General: No deformity or edema.   Lymphadenopathy:     She has no cervical adenopathy.   Neurological: She is alert. She exhibits normal muscle tone. Gait normal.   Skin: Skin is warm. No rash noted. She is not diaphoretic.   Psychiatric: Mood, " affect and judgment normal.        Labs:  Reviewed last labs with patient.    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    -     CBC WITH DIFFERENTIAL; Future  -     VITAMIN B12; Future  -     VITAMIN D,25 HYDROXY; Future  -     FOLATE; Future  -     VITAMIN B1; Future  -     VITAMIN B3 NIACIN; Future  -     VITAMIN B6; Future  -     MAGNESIUM; Future  -     ZINC SERUM; Future    Chronic GERD  · Stable, continue omeprazole daily    Essential hypertension  · Controlled with current regimen.  · Continue same medication regimen    Mixed hyperlipidemia  · Not able to tolerate statins.    -     Lipid Profile; Future    Moderate persistent asthma without complication  · Controlled with current regimen.  · Continue same medication regimen.  · Advised to follow-up with pulmonology    Primary insomnia  · Stable, continue same medication regimens  · We will sign controlled substance treatment agreement at next visit.    Type 2 diabetes mellitus without complication, without long-term current use of insulin (HCC): A1c at goal as per previous labs.  · Recheck A1c to check control.  · Foot exam done today, normal,  · Ordered urine microalbumin creatinine ratio.  · Advised to follow-up with ophthalmology for eye exam.    -     Comp Metabolic Panel; Future  -     HEMOGLOBIN A1C; Future  -     Diabetic Monofilament LE Exam  -     MICROALBUMIN CREAT RATIO URINE; Future    Postoperative hypothyroidism  · Controlled with current regimen.  · Continue same medication regimen.  · Recheck thyroid function test.  · Refilled medication.    -     TSH WITH REFLEX TO FT4; Future  -     levothyroxine (SYNTHROID) 88 MCG Tab; Take 1 Tab by mouth every morning. ON A EMPTY STOMACH    Encounter for screening mammogram for breast cancer  -     MA-SCREENING MAMMO BILAT W/TOMOSYNTHESIS W/CAD; Future    Need for vaccination  -      TD Preservative Free =>6yo IM    Family history of breast cancer  · BRCA gene testing as per patient negative.  · Mammogram ordered.         Please note that this dictation was created using voice recognition software. I have made every reasonable attempt to correct obvious errors, but I expect that there are errors of grammar and possibly content that I did not discover before finalizing the note.    Follow up in 1 month for annual for Pap smear and lab follow-up.

## 2020-12-18 NOTE — ASSESSMENT & PLAN NOTE
Patient reports family history of breast cancer in her mother, grandmother and sister she reports that she had genetic testing for BRCA which came back negative.  She is due for her mammogram.

## 2020-12-18 NOTE — ASSESSMENT & PLAN NOTE
Chronic problem for this patient new to me.  She is currently on levothyroxine 88 mcg once daily.  Denies any hair loss, skin changes, weight changes, fatigue.  Her last thyroid function was normal.  She requested refill for this medication.

## 2020-12-18 NOTE — ASSESSMENT & PLAN NOTE
Chronic problem for this patient.  New to me.  She reports that she takes Sonata 10 mg as needed for symptoms.  She reported she had tried melatonin in the past which causes her weird dreams.  She reports that she has of the pills of Sonata medication left at home.

## 2020-12-18 NOTE — ASSESSMENT & PLAN NOTE
Chronic problem for this patient.  New to me.  She is taking omeprazole 40 mg once daily.  She reports that she had endoscopy and esophageal manometry which came back negative.  She had concern for interstitial lung disease and she followed with the [facility] and that was found due to retrograde aspiration.  She has history of gastric bypass Kennedy-en-Y.  She followed with bariatric surgeon also.  She has improvement in her symptoms so recommended no reversal.

## 2021-01-09 ENCOUNTER — HOSPITAL ENCOUNTER (OUTPATIENT)
Dept: RADIOLOGY | Facility: MEDICAL CENTER | Age: 61
End: 2021-01-09
Attending: FAMILY MEDICINE
Payer: COMMERCIAL

## 2021-01-09 DIAGNOSIS — Z12.31 ENCOUNTER FOR SCREENING MAMMOGRAM FOR BREAST CANCER: ICD-10-CM

## 2021-01-09 PROCEDURE — 77063 BREAST TOMOSYNTHESIS BI: CPT

## 2021-01-11 ENCOUNTER — HOSPITAL ENCOUNTER (OUTPATIENT)
Dept: LAB | Facility: MEDICAL CENTER | Age: 61
End: 2021-01-11
Attending: FAMILY MEDICINE
Payer: COMMERCIAL

## 2021-01-11 DIAGNOSIS — E89.0 POSTOPERATIVE HYPOTHYROIDISM: ICD-10-CM

## 2021-01-11 DIAGNOSIS — E11.9 TYPE 2 DIABETES MELLITUS WITHOUT COMPLICATION, WITHOUT LONG-TERM CURRENT USE OF INSULIN (HCC): ICD-10-CM

## 2021-01-11 DIAGNOSIS — Z98.0 S/P TOTAL GASTRECTOMY AND ROUX-EN-Y ESOPHAGOJEJUNAL ANASTOMOSIS: ICD-10-CM

## 2021-01-11 DIAGNOSIS — Z90.3 S/P TOTAL GASTRECTOMY AND ROUX-EN-Y ESOPHAGOJEJUNAL ANASTOMOSIS: ICD-10-CM

## 2021-01-11 DIAGNOSIS — E78.2 MIXED HYPERLIPIDEMIA: ICD-10-CM

## 2021-01-11 LAB
25(OH)D3 SERPL-MCNC: 48 NG/ML (ref 30–100)
ALBUMIN SERPL BCP-MCNC: 3.7 G/DL (ref 3.2–4.9)
ALBUMIN/GLOB SERPL: 0.9 G/DL
ALP SERPL-CCNC: 67 U/L (ref 30–99)
ALT SERPL-CCNC: 26 U/L (ref 2–50)
ANION GAP SERPL CALC-SCNC: 9 MMOL/L (ref 7–16)
AST SERPL-CCNC: 25 U/L (ref 12–45)
BASOPHILS # BLD AUTO: 1.4 % (ref 0–1.8)
BASOPHILS # BLD: 0.1 K/UL (ref 0–0.12)
BILIRUB SERPL-MCNC: 0.6 MG/DL (ref 0.1–1.5)
BUN SERPL-MCNC: 11 MG/DL (ref 8–22)
CALCIUM SERPL-MCNC: 9.3 MG/DL (ref 8.5–10.5)
CHLORIDE SERPL-SCNC: 102 MMOL/L (ref 96–112)
CHOLEST SERPL-MCNC: 210 MG/DL (ref 100–199)
CO2 SERPL-SCNC: 28 MMOL/L (ref 20–33)
CREAT SERPL-MCNC: 0.62 MG/DL (ref 0.5–1.4)
CREAT UR-MCNC: 111.61 MG/DL
EOSINOPHIL # BLD AUTO: 0.19 K/UL (ref 0–0.51)
EOSINOPHIL NFR BLD: 2.7 % (ref 0–6.9)
ERYTHROCYTE [DISTWIDTH] IN BLOOD BY AUTOMATED COUNT: 45.5 FL (ref 35.9–50)
EST. AVERAGE GLUCOSE BLD GHB EST-MCNC: 120 MG/DL
FASTING STATUS PATIENT QL REPORTED: NORMAL
FOLATE SERPL-MCNC: 29.2 NG/ML
GLOBULIN SER CALC-MCNC: 3.9 G/DL (ref 1.9–3.5)
GLUCOSE SERPL-MCNC: 115 MG/DL (ref 65–99)
HBA1C MFR BLD: 5.8 % (ref 0–5.6)
HCT VFR BLD AUTO: 49.5 % (ref 37–47)
HDLC SERPL-MCNC: 98 MG/DL
HGB BLD-MCNC: 16.1 G/DL (ref 12–16)
IMM GRANULOCYTES # BLD AUTO: 0.04 K/UL (ref 0–0.11)
IMM GRANULOCYTES NFR BLD AUTO: 0.6 % (ref 0–0.9)
LDLC SERPL CALC-MCNC: 92 MG/DL
LYMPHOCYTES # BLD AUTO: 1.24 K/UL (ref 1–4.8)
LYMPHOCYTES NFR BLD: 17.8 % (ref 22–41)
MAGNESIUM SERPL-MCNC: 2.1 MG/DL (ref 1.5–2.5)
MCH RBC QN AUTO: 30.8 PG (ref 27–33)
MCHC RBC AUTO-ENTMCNC: 32.5 G/DL (ref 33.6–35)
MCV RBC AUTO: 94.6 FL (ref 81.4–97.8)
MICROALBUMIN UR-MCNC: <1.2 MG/DL
MICROALBUMIN/CREAT UR: NORMAL MG/G (ref 0–30)
MONOCYTES # BLD AUTO: 0.63 K/UL (ref 0–0.85)
MONOCYTES NFR BLD AUTO: 9.1 % (ref 0–13.4)
NEUTROPHILS # BLD AUTO: 4.75 K/UL (ref 2–7.15)
NEUTROPHILS NFR BLD: 68.4 % (ref 44–72)
NRBC # BLD AUTO: 0 K/UL
NRBC BLD-RTO: 0 /100 WBC
PLATELET # BLD AUTO: 285 K/UL (ref 164–446)
PMV BLD AUTO: 9.4 FL (ref 9–12.9)
POTASSIUM SERPL-SCNC: 3.7 MMOL/L (ref 3.6–5.5)
PROT SERPL-MCNC: 7.6 G/DL (ref 6–8.2)
RBC # BLD AUTO: 5.23 M/UL (ref 4.2–5.4)
SODIUM SERPL-SCNC: 139 MMOL/L (ref 135–145)
T4 FREE SERPL-MCNC: 1.41 NG/DL (ref 0.93–1.7)
TRIGL SERPL-MCNC: 99 MG/DL (ref 0–149)
TSH SERPL DL<=0.005 MIU/L-ACNC: 6.83 UIU/ML (ref 0.38–5.33)
VIT B12 SERPL-MCNC: 1277 PG/ML (ref 211–911)
WBC # BLD AUTO: 7 K/UL (ref 4.8–10.8)

## 2021-01-11 PROCEDURE — 84630 ASSAY OF ZINC: CPT

## 2021-01-11 PROCEDURE — 84207 ASSAY OF VITAMIN B-6: CPT

## 2021-01-11 PROCEDURE — 84591 ASSAY OF NOS VITAMIN: CPT | Mod: XU

## 2021-01-11 PROCEDURE — 82043 UR ALBUMIN QUANTITATIVE: CPT

## 2021-01-11 PROCEDURE — 85025 COMPLETE CBC W/AUTO DIFF WBC: CPT

## 2021-01-11 PROCEDURE — 84425 ASSAY OF VITAMIN B-1: CPT

## 2021-01-11 PROCEDURE — 84439 ASSAY OF FREE THYROXINE: CPT

## 2021-01-11 PROCEDURE — 80053 COMPREHEN METABOLIC PANEL: CPT

## 2021-01-11 PROCEDURE — 83735 ASSAY OF MAGNESIUM: CPT

## 2021-01-11 PROCEDURE — 82746 ASSAY OF FOLIC ACID SERUM: CPT

## 2021-01-11 PROCEDURE — 83036 HEMOGLOBIN GLYCOSYLATED A1C: CPT

## 2021-01-11 PROCEDURE — 82306 VITAMIN D 25 HYDROXY: CPT

## 2021-01-11 PROCEDURE — 82570 ASSAY OF URINE CREATININE: CPT

## 2021-01-11 PROCEDURE — 36415 COLL VENOUS BLD VENIPUNCTURE: CPT

## 2021-01-11 PROCEDURE — 82607 VITAMIN B-12: CPT

## 2021-01-11 PROCEDURE — 80061 LIPID PANEL: CPT

## 2021-01-11 PROCEDURE — 84443 ASSAY THYROID STIM HORMONE: CPT

## 2021-01-13 LAB — ZINC SERPL-MCNC: 82.7 UG/DL (ref 60–120)

## 2021-01-14 LAB — VIT B6 SERPL-MCNC: 57 NMOL/L (ref 20–125)

## 2021-01-15 ENCOUNTER — HOSPITAL ENCOUNTER (OUTPATIENT)
Facility: MEDICAL CENTER | Age: 61
End: 2021-01-15
Attending: FAMILY MEDICINE
Payer: COMMERCIAL

## 2021-01-15 ENCOUNTER — OFFICE VISIT (OUTPATIENT)
Dept: MEDICAL GROUP | Facility: MEDICAL CENTER | Age: 61
End: 2021-01-15
Payer: COMMERCIAL

## 2021-01-15 VITALS
WEIGHT: 180.78 LBS | RESPIRATION RATE: 16 BRPM | SYSTOLIC BLOOD PRESSURE: 125 MMHG | OXYGEN SATURATION: 94 % | HEIGHT: 69 IN | DIASTOLIC BLOOD PRESSURE: 78 MMHG | HEART RATE: 82 BPM | BODY MASS INDEX: 26.78 KG/M2 | TEMPERATURE: 97.3 F

## 2021-01-15 DIAGNOSIS — Z01.419 ENCOUNTER FOR ANNUAL ROUTINE GYNECOLOGICAL EXAMINATION: ICD-10-CM

## 2021-01-15 DIAGNOSIS — R77.1 ELEVATED SERUM GLOBULIN LEVEL: ICD-10-CM

## 2021-01-15 DIAGNOSIS — Z12.4 SCREENING FOR MALIGNANT NEOPLASM OF CERVIX: ICD-10-CM

## 2021-01-15 DIAGNOSIS — E78.2 MIXED HYPERLIPIDEMIA: ICD-10-CM

## 2021-01-15 DIAGNOSIS — E89.0 POSTOPERATIVE HYPOTHYROIDISM: ICD-10-CM

## 2021-01-15 DIAGNOSIS — D58.2 ELEVATED HEMOGLOBIN (HCC): ICD-10-CM

## 2021-01-15 DIAGNOSIS — R71.8 ELEVATED HEMATOCRIT: ICD-10-CM

## 2021-01-15 DIAGNOSIS — E11.9 TYPE 2 DIABETES MELLITUS WITHOUT COMPLICATION, WITHOUT LONG-TERM CURRENT USE OF INSULIN (HCC): ICD-10-CM

## 2021-01-15 DIAGNOSIS — Z23 NEED FOR VACCINATION: ICD-10-CM

## 2021-01-15 LAB — VIT B1 BLD-MCNC: 154 NMOL/L (ref 70–180)

## 2021-01-15 PROCEDURE — 90750 HZV VACC RECOMBINANT IM: CPT | Performed by: FAMILY MEDICINE

## 2021-01-15 PROCEDURE — 88175 CYTOPATH C/V AUTO FLUID REDO: CPT

## 2021-01-15 PROCEDURE — 87624 HPV HI-RISK TYP POOLED RSLT: CPT

## 2021-01-15 PROCEDURE — 90471 IMMUNIZATION ADMIN: CPT | Performed by: FAMILY MEDICINE

## 2021-01-15 PROCEDURE — 99000 SPECIMEN HANDLING OFFICE-LAB: CPT | Performed by: FAMILY MEDICINE

## 2021-01-15 PROCEDURE — 99214 OFFICE O/P EST MOD 30 MIN: CPT | Mod: 25 | Performed by: FAMILY MEDICINE

## 2021-01-15 PROCEDURE — 99396 PREV VISIT EST AGE 40-64: CPT | Mod: 25 | Performed by: FAMILY MEDICINE

## 2021-01-15 RX ORDER — LEVOTHYROXINE SODIUM 0.1 MG/1
100 TABLET ORAL
Qty: 90 TAB | Refills: 0 | Status: SHIPPED | OUTPATIENT
Start: 2021-01-15 | End: 2021-04-06

## 2021-01-15 ASSESSMENT — ENCOUNTER SYMPTOMS
SPUTUM PRODUCTION: 0
COUGH: 0
FEVER: 0
NERVOUS/ANXIOUS: 0
CHILLS: 0
EYE PAIN: 0
EYE REDNESS: 0
VOMITING: 0
FOCAL WEAKNESS: 0
WHEEZING: 0
HEMOPTYSIS: 0
WEIGHT LOSS: 0
DEPRESSION: 0
DIZZINESS: 0
CONSTIPATION: 0
DIARRHEA: 0
EYE DISCHARGE: 0
SINUS PAIN: 0
SENSORY CHANGE: 0
SHORTNESS OF BREATH: 0
NAUSEA: 0
HEADACHES: 0
MYALGIAS: 0
PALPITATIONS: 0
ABDOMINAL PAIN: 0

## 2021-01-15 ASSESSMENT — PATIENT HEALTH QUESTIONNAIRE - PHQ9: CLINICAL INTERPRETATION OF PHQ2 SCORE: 0

## 2021-01-15 ASSESSMENT — FIBROSIS 4 INDEX: FIB4 SCORE: 1.03

## 2021-01-15 NOTE — PROGRESS NOTES
60 y.o. female for annual routine gynecologic exam.    Chief Complaint.  Annual GYN and lab follow-up.    History of present illness:    Patient is here for lab follow-up also.  She is currently taking levothyroxine 88 mcg once daily for hypothyroidism.  Recent labs showed elevated TSH level.  Recent labs showed improvement in cholesterol levels.  Her A1c is under control.  Recent labs showed elevated serum globulin, elevated hemoglobin and an elevated hematocrit.  Reports that she sprained her left knee 2 days ago.  She reports that she tumbled over dog's toy.  Reports that pain is significantly better.  Denies any fall.    Last Pap Smear: 3 years, was normal  History of abnormal pap smears. None  Gyn Surgery: None  Current Contraceptive Method:  Tubal ligation  Menopause at 47    Health Maintenance  Advanced directive: n/a  Osteoporosis Screen/ DEXA: n/a  PT/vit D for falls prevention: n/a   Cholesterol Screening: Recent lipid panel showed elevated total cholesterol, but improving  Diabetes Screening: A1c 5.8  AAA Screening (65 to 74 year male): n/a   Aspirin Use: N/A  Diet: Tries to eat healthy diet  Exercise: Is physically active  Substance Abuse: No  Safe in relationship.  Seat belts, bike helmet, gun safety discussed.  Sun protection used.    Cancer screening  Colorectal Cancer Screening: 3 years ago, normal, diverticulosis, repeat recommended in 10 years  Lung Cancer Screening: No smoking  Cervical Cancer Screening: Pap smear done today  Breast Cancer Screening: Recent mammogram done in 01/20/2021 -      Infectious disease screening/Immunizations  --STI Screening: None  --Practices safe sex.  --Immunizations: Due for shingles vaccine and hepatitis B.  Patient reports that she got hepatitis B injection in [place].  We will give shingles vaccine.      Review of systems:    Review of Systems   Constitutional: Negative for chills, fever, malaise/fatigue and weight loss.   HENT: Negative for ear discharge, ear  pain, hearing loss and sinus pain.    Eyes: Negative for pain, discharge and redness.   Respiratory: Negative for cough, hemoptysis, sputum production, shortness of breath and wheezing.    Cardiovascular: Negative for chest pain, palpitations and leg swelling.   Gastrointestinal: Negative for abdominal pain, constipation, diarrhea, nausea and vomiting.   Genitourinary: Negative for dysuria, frequency and urgency.   Musculoskeletal: Negative for joint pain and myalgias.        Left knee pain   Skin: Negative for itching and rash.   Neurological: Negative for dizziness, sensory change, focal weakness and headaches.   Endo/Heme/Allergies: Negative for environmental allergies.   Psychiatric/Behavioral: Negative for depression. The patient is not nervous/anxious.           Past Medical, Surgical and Family History:    Past Medical History:   Diagnosis Date   • Asthma    • Diabetes (HCC)    • Hyperlipidemia    • Hypertension    • Thyroid disease      Past Surgical History:   Procedure Laterality Date   • BRONCHOSCOPY-ENDO  10/15/2019    Procedure: BRONCHOSCOPY;  Surgeon: Rosa Elena Richard M.D.;  Location: [facility];  Service: Pulmonary   • BRONCHOSCOPY-ENDO  4/23/2019    Procedure: BRONCHOSCOPY;  Surgeon: Holly Velasquez M.D.;  Location: [facility];  Service: Pulmonary   • BRONCHOSCOPY/NDL BX  4/23/2019    Procedure: BRONCHOSCOPY, WITH NEEDLE BIOPSY-  TRANSBRONCHIAL BX;  Surgeon: Holly Velasquez M.D.;  Location: [facility];  Service: Pulmonary   • GASTRECTOMY  2006    Rou-En-Y   • PB C-SEC ONLY,PBEV C-SEC     • PRIMARY C SECTION       Family History   Problem Relation Age of Onset   • Cancer Mother 55        breast   • Heart Disease Mother         MI   • Cancer Maternal Grandmother         breast   • Cancer Sister         breast   • Diabetes Father    • Cancer Father         SCC lung        Social History:    Social History     Tobacco Use   • Smoking status: Former Smoker      Packs/day: 1.00     Years: 20.00     Pack years: 20.00     Types: Cigarettes     Quit date: 1994     Years since quittin.0   • Smokeless tobacco: Never Used   Substance Use Topics   • Alcohol use: Yes     Alcohol/week: 0.6 - 1.2 oz     Types: 1 - 2 Shots of liquor per week     Frequency: Never     Drinks per session: 1 or 2     Binge frequency: Never     Comment: 1-2x per week, 1-2 drinks   • Drug use: Not Currently     Types: Marijuana, Oral     Comment: edibled        Medications and Allergies:     Current Outpatient Medications   Medication Sig Dispense Refill   • levothyroxine (SYNTHROID) 100 MCG Tab Take 1 Tab by mouth Every morning on an empty stomach. 90 Tab 0   • omeprazole (PRILOSEC) 40 MG delayed-release capsule Take 1 Cap by mouth every day. 90 Cap 1   • lisinopril (PRINIVIL) 20 MG Tab Take 1 Tab by mouth every day. 30 Tab 5   • Multiple Vitamins-Minerals (ZINC PO) Take 35 mg by mouth every day.     • fluticasone (FLOVENT HFA) 220 MCG/ACT Aerosol Inhale 2 Puffs by mouth 2 times a day. Use with spacer.  Rinse mouth after each use. 1 Inhaler 11   • montelukast (SINGULAIR) 10 MG Tab Take 1 Tab by mouth every bedtime. 30 Tab 11   • Coenzyme Q10 (COQ-10 PO) Take  by mouth.     • Probiotic Product (PROBIOTIC DAILY PO) Take  by mouth.     • ibuprofen (MOTRIN) 200 MG Tab Take 400 mg by mouth every 6 hours as needed for Mild Pain.     • CALCIUM PO Take 1 Tab by mouth every day.     • IRON PO Take 1 Tab by mouth every evening.     • Ascorbic Acid (VITAMIN C PO) Take 1 Tab by mouth every evening.     • albuterol 108 (90 Base) MCG/ACT Aero Soln inhalation aerosol Inhale 2 Puffs by mouth every 6 hours as needed for Shortness of Breath.     • Omega-3 Fatty Acids (FISH OIL) 1000 MG Cap capsule Take 1,000 mg by mouth every day.     • vitamin D (CHOLECALCIFEROL) 1000 UNIT Tab Take 1,000 Units by mouth every day.     • therapeutic multivitamin-minerals (THERAGRAN-M) Tab Take 1 Tab by mouth every day.     •  magnesium gluconate (MAG-G) 500 MG tablet Take 500 mg by mouth every day.     • cyanocobalamin (VITAMIN B-12) 500 MCG Tab Take 500 mcg by mouth every day.       No current facility-administered medications for this visit.         Allergies   Allergen Reactions   • Codeine      Pt reports that it makes her feel weird    • Sulfa Drugs Rash     .       Vitals:    There were no vitals taken for this visit. There is no height or weight on file to calculate BMI.    Physical Exam:   Physical Exam   Constitutional: She is well-developed, well-nourished, and in no distress. No distress.   HENT:   Head: Normocephalic and atraumatic.   Right Ear: External ear normal.   Left Ear: External ear normal.   Eyes: Conjunctivae and EOM are normal. Right eye exhibits no discharge. Left eye exhibits no discharge.   Neck: Neck supple. No thyromegaly present.   Cardiovascular: Normal rate, regular rhythm, normal heart sounds and intact distal pulses.   Pulmonary/Chest: Effort normal and breath sounds normal. No respiratory distress.   Abdominal: Soft. Bowel sounds are normal.   Neurological: She is alert. She exhibits normal muscle tone.   Skin: Skin is warm. No rash noted. She is not diaphoretic.   Psychiatric: Mood, affect and judgment normal.      Medical assistant Fidelia present in room during breast and pelvic exam.  Breast Exam: No axillary lymphadenopathy, no skin changes, no dominant masses. No nipple retraction  Pelvic Exam -  Normal external genitalia with no lesions. Vaginal Mucosa:  normal vaginal mucosa . Cervix with no visible lesions. No cervical motion tenderness. Uterus is normal sized with no masses. No adnexal tenderness or enlargement appreciated. Thin Prep Pap is obtained, vaginal swab is obtained and specimen(s) sent to lab.    Labs:  I reviewed recent labs microalbumin creatinine ratio, zinc, magnesium, B6, B3, B1, folate, vitamin D, vitamin B12, TSH, lipid panel, A1c, CMP, CBC (dated: 1/11/2021) with patient.      Assessment/Plan:    Diagnoses and all orders for this visit:    Encounter for annual routine gynecological examination:  Discussed  breast self exam, mammography screening, adequate intake of calcium and vitamin D, diet and exercise      Screening for malignant neoplasm of cervix  -     THINPREP PAP WITH HPV; Future      Postoperative hypothyroidism:  Uncontrolled TSH level.  Increase levothyroxine 200 mcg once daily.  Repeat TSH in 6 to 8 weeks.    -     levothyroxine (SYNTHROID) 100 MCG Tab; Take 1 Tab by mouth Every morning on an empty stomach.  -     TSH WITH REFLEX TO FT4; Future    Mixed hyperlipidemia  Improving,  The 10-year ASCVD risk score (Betina FLORIAN Jr., et al., 2013) is: 5.7%  Advised healthy diet and aerobic exercise 150 minutes in a week.  Repeat labs in 6 months    -     Lipid Profile; Future    Type 2 diabetes mellitus without complication, without long-term current use of insulin (HCC)  Controlled without medications.  Advised to reduce dose of vitamin B12 supplementation as levels are little high.    -     HEMOGLOBIN A1C; Future    Elevated serum globulin level  Improving, although elevated.  If elevated next blood work, will do serum protein electrophoresis.    -     Comp Metabolic Panel; Future    Elevated hemoglobin (HCC)  -     CBC WITHOUT DIFFERENTIAL; Future    Elevated hematocrit  -     CBC WITHOUT DIFFERENTIAL; Future    Mild elevation in hemoglobin and hematocrit.  I reviewed previous lab trends, hemoglobin and hematocrit are improving although elevated.  We will repeat CBC in 6 months if elevated, will do further evaluation.    Need for vaccination  First dose of shingles vaccine given today, second dose in 2 to 6 months.    -     Shingles Vaccine (Shingrix)      Discussed  breast self exam, mammography screening, adequate intake of calcium and vitamin D, diet and exercise     Follow up in 6 months for lab follow-up

## 2021-01-16 DIAGNOSIS — Z12.4 SCREENING FOR MALIGNANT NEOPLASM OF CERVIX: ICD-10-CM

## 2021-01-16 DIAGNOSIS — Z01.419 ENCOUNTER FOR ANNUAL ROUTINE GYNECOLOGICAL EXAMINATION: ICD-10-CM

## 2021-01-18 LAB
CYTOLOGY REG CYTOL: NORMAL
HPV HR 12 DNA CVX QL NAA+PROBE: NEGATIVE
HPV16 DNA SPEC QL NAA+PROBE: NEGATIVE
HPV18 DNA SPEC QL NAA+PROBE: NEGATIVE
SPECIMEN SOURCE: NORMAL

## 2021-01-19 LAB — NIACIN SERPL-MCNC: 3.87 UG/ML (ref 0.5–8.45)

## 2021-01-26 RX ORDER — LISINOPRIL 20 MG/1
20 TABLET ORAL DAILY
Qty: 90 TAB | Refills: 3 | Status: SHIPPED | OUTPATIENT
Start: 2021-01-26

## 2021-01-26 NOTE — TELEPHONE ENCOUNTER
----- Message from Lillian Addison sent at 1/25/2021  7:34 PM PST -----  Regarding: Non-Urgent Medical Question  Contact: [phone]  My lisinipril script is out of refills.  Can I please get a refill at 90 days plus 3 refills?    Lillian Addison  1960

## 2021-02-27 ENCOUNTER — HOSPITAL ENCOUNTER (OUTPATIENT)
Dept: LAB | Facility: MEDICAL CENTER | Age: 61
End: 2021-02-27
Attending: FAMILY MEDICINE
Payer: COMMERCIAL

## 2021-02-27 DIAGNOSIS — E89.0 POSTOPERATIVE HYPOTHYROIDISM: ICD-10-CM

## 2021-02-27 LAB — TSH SERPL DL<=0.005 MIU/L-ACNC: 2.74 UIU/ML (ref 0.38–5.33)

## 2021-02-27 PROCEDURE — 84443 ASSAY THYROID STIM HORMONE: CPT

## 2021-02-27 PROCEDURE — 36415 COLL VENOUS BLD VENIPUNCTURE: CPT

## 2021-03-15 DIAGNOSIS — Z23 NEED FOR VACCINATION: ICD-10-CM

## 2021-04-06 DIAGNOSIS — E89.0 POSTOPERATIVE HYPOTHYROIDISM: ICD-10-CM

## 2021-04-06 RX ORDER — LEVOTHYROXINE SODIUM 0.1 MG/1
TABLET ORAL
Qty: 90 TABLET | Refills: 3 | Status: SHIPPED | OUTPATIENT
Start: 2021-04-06

## 2021-04-06 NOTE — TELEPHONE ENCOUNTER
Received request via: Pharmacy    Was the patient seen in the last year in this department? Yes    Does the patient have an active prescription (recently filled or refills available) for medication(s) requested? No     Requested Prescriptions     Pending Prescriptions Disp Refills   • levothyroxine (SYNTHROID) 100 MCG Tab [Pharmacy Med Name: LEVOTHYROXINE 0.100MG (100MCG) TAB]  0     Sig: TAKE 1 TABLET BY MOUTH EVERY MORNING ON AN EMPTY STOMACH

## 2021-04-19 ENCOUNTER — NON-PROVIDER VISIT (OUTPATIENT)
Dept: MEDICAL GROUP | Facility: MEDICAL CENTER | Age: 61
End: 2021-04-19
Payer: COMMERCIAL

## 2021-04-19 DIAGNOSIS — Z23 NEED FOR VACCINATION: ICD-10-CM

## 2021-05-04 DIAGNOSIS — R05.9 COUGH: ICD-10-CM

## 2021-05-04 RX ORDER — MONTELUKAST SODIUM 10 MG/1
TABLET ORAL
Qty: 30 TABLET | Refills: 11 | Status: SHIPPED | OUTPATIENT
Start: 2021-05-04

## 2021-05-04 RX ORDER — FLUTICASONE PROPIONATE 220 UG/1
AEROSOL, METERED RESPIRATORY (INHALATION)
Qty: 1 EACH | Refills: 5 | Status: SHIPPED | OUTPATIENT
Start: 2021-05-04

## 2021-05-04 NOTE — TELEPHONE ENCOUNTER
Caller Name: Lillian Addison                 Call Back Number: [phone] (home)         Patient approves a detailed voicemail message: N\A    Have we ever prescribed this med? Yes.  If yes, what date? FLOVENT  MCG/ACT Aerosol, montelukast (SINGULAIR) 10 MG Tab 5/26/20    Last OV: 12/15/2020 Dr. Pitt     Next OV: 6/16/2021 Dr. Pitt     DX: cough     Medications:  Requested Prescriptions     Pending Prescriptions Disp Refills   • FLOVENT  MCG/ACT Aerosol [Pharmacy Med Name: FLOVENT  MCG ORAL INH 120INH] 12 g      Sig: INHALE 2 PUFFS BY MOUTH TWICE DAILY WITH SPACER, RINSE MOUTH AFTER EACH USE   • montelukast (SINGULAIR) 10 MG Tab [Pharmacy Med Name: MONTELUKAST 10MG TABLETS]  11     Sig: TAKE 1 TABLET BY MOUTH EVERY NIGHT AT BEDTIME

## 2021-05-14 RX ORDER — OMEPRAZOLE 40 MG/1
40 CAPSULE, DELAYED RELEASE ORAL DAILY
Qty: 90 CAPSULE | Refills: 3 | Status: SHIPPED | OUTPATIENT
Start: 2021-05-14

## 2021-05-28 NOTE — PROGRESS NOTES
Hospital Medicine Daily Progress Note    Date of Service  9/19/2019    Chief Complaint  59 y.o. female admitted 9/18/2019 with hypoxia.    Hospital Course    History of Presenting Illness  59 y.o. female who presented 9/18/2019 with hypoxia. She was seen today by Dr Pitt at the pulmonary clinic for a routine follow up. She says she has been checking her pulse ox for the last week and it has been in the 80s. She has a significant recent history of a severe community acquired pneumonia in April that required a bronc showing blood fluid with many polys present. Cultures however remained negative. A CT at that time showed bilateral groundglass opacities. She improved with antibiotics and steroids and was not hypoxic on discharge. A follow up CT in June showed a significant improvement in her CT findings. She was having persistent symptoms and had a bronchdilator response to PFTs 1/19. She was placed onto inhalers for suspected RAD and did improve initially. Since then she worsened with worsening hypoxia and dyspnea and a cough productive of shite sputum. She denies fevers or chills.          Interval Problem Update  9/19: no shortness of breath, some white sputum production but not as much as when she was presenting with bacterial pneumonia. Cough is persistent.    Consultants/Specialty  Pulmonary, Dr. Richard    Code Status  full    Disposition  Home when medically cleared    Review of Systems  ROS     Physical Exam  Temp:  [36.7 °C (98 °F)-36.8 °C (98.2 °F)] 36.7 °C (98 °F)  Pulse:  [] 70  Resp:  [18-20] 18  BP: (111-140)/(53-81) 130/64  SpO2:  [90 %-93 %] 92 %    Physical Exam    Fluids    Intake/Output Summary (Last 24 hours) at 9/19/2019 2025  Last data filed at 9/19/2019 1900  Gross per 24 hour   Intake 720 ml   Output 0 ml   Net 720 ml       Laboratory  Recent Labs     09/19/19  0204   WBC 9.8   RBC 5.67*   HEMOGLOBIN 16.8*   HEMATOCRIT 50.3*   MCV 88.7   MCH 29.6   MCHC 33.4*   RDW 50.8*   PLATELETCT 314  Initial Anesthesia Post-op Note    Patient: Cynthia Huitron  Procedure(s) Performed: LAPAROSCOPIC, APPENDECTOMY  Anesthesia type: General        Patient Location: PACU Phase 1  Post-op Vital Signs:stable  Level of Consciousness: awake and follows commands  Respiratory Status: spontaneous ventilation, unassisted and room air  Cardiovascular blood pressure returned to baseline and stable  Hydration: euvolemic  Pain Management: well controlled  Handoff: Handoff to receiving nurse was performed and questions were answered  Vomiting: none  Nausea: None  Airway Patency:patent  Post-op Assessment: no complications, patient tolerated procedure well with no complications, no evidence of recall, dentition within defined limits and No Corneal Abrasion      No complications documented.     MPV 9.1     Recent Labs     09/19/19  0204   SODIUM 140   POTASSIUM 4.1   CHLORIDE 103   CO2 24   GLUCOSE 155*   BUN 9   CREATININE 0.67   CALCIUM 9.4                   Imaging  DX-CHEST-2 VIEWS   Final Result      No radiographic evidence of acute cardiopulmonary process.      CT-CHEST, HIGH RESOLUTION LUNG    (Results Pending)   DX-ESOPHAGUS - BARIUM SWALLOW    (Results Pending)   CT-CHEST, HIGH RESOLUTION LUNG    (Results Pending)        Assessment/Plan  * Acute on chronic respiratory failure (HCC)- (present on admission)  Assessment & Plan  Worsening hypoxia and coughing in the setting of possible RAD. Will get a sputum culture and repeat a high res ct of the chest given her history earlier this year.  High res ct delayed due to scanner being down  Order barium swallow due to history of sol en y surgery may be having chronic reflux  Home oxygen orders placed  Pulmonology  Consult requested discussed with dr. Richard    Moderate persistent asthma- (present on admission)  Assessment & Plan  Treat for acute flare as above.     Interstitial lung disease (HCC)  Assessment & Plan  Suspected with recurrent hypoxia and interstitial pneumonia  Workup for immunological causes ordered per pulmonary discussed with DR. Richard.  Supplemental oxygen, home oxygen orders placed    Type 2 diabetes mellitus without complication, without long-term current use of insulin (HCC)- (present on admission)  Assessment & Plan  lasi a1c was 6.2 and not on meds. Will monitor daily glucose for now.     Postoperative hypothyroidism- (present on admission)  Assessment & Plan  Continue meds    Essential hypertension- (present on admission)  Assessment & Plan  Continue meds         VTE prophylaxis: scd

## 2021-06-15 ENCOUNTER — NON-PROVIDER VISIT (OUTPATIENT)
Dept: SLEEP MEDICINE | Facility: MEDICAL CENTER | Age: 61
End: 2021-06-15
Attending: INTERNAL MEDICINE
Payer: COMMERCIAL

## 2021-06-15 VITALS — HEIGHT: 69 IN | BODY MASS INDEX: 26.81 KG/M2 | WEIGHT: 181 LBS

## 2021-06-15 DIAGNOSIS — J84.9 ILD (INTERSTITIAL LUNG DISEASE) (HCC): ICD-10-CM

## 2021-06-15 PROCEDURE — 94010 BREATHING CAPACITY TEST: CPT | Performed by: INTERNAL MEDICINE

## 2021-06-15 PROCEDURE — 94726 PLETHYSMOGRAPHY LUNG VOLUMES: CPT | Performed by: INTERNAL MEDICINE

## 2021-06-15 PROCEDURE — 94729 DIFFUSING CAPACITY: CPT | Performed by: INTERNAL MEDICINE

## 2021-06-15 ASSESSMENT — PULMONARY FUNCTION TESTS
FEV1/FVC: 86
FVC_LLN: 3.14
FVC_PERCENT_PREDICTED: 87
FEV1/FVC_PREDICTED: 79
FEV1_PREDICTED: 2.93
FEV1/FVC_PERCENT_LLN: 66
FEV1/FVC: 86
FEV1_PERCENT_PREDICTED: 96
FEV1: 2.83
FEV1_LLN: 2.45
FVC_PREDICTED: 3.76
FEV1/FVC_PERCENT_PREDICTED: 110
FVC: 3.28
FEV1/FVC_PERCENT_PREDICTED: 109
FEV1/FVC_PERCENT_PREDICTED: 78

## 2021-06-15 ASSESSMENT — FIBROSIS 4 INDEX: FIB4 SCORE: 1.03

## 2021-06-15 NOTE — PROCEDURES
Technician: Valeria Simpson RRT, CPFT  Good patient effort & cooperation. DUE TO NO SPACERS, NO POST FVC.  The results of this test meet the ATS/ERS standards for acceptability & reproducibility.  Test was performed on the [employer] Body Plethysmograph-Elite DX system.  Predicted equations for Spirometry are GLI-2012, ITS for lung volumes, and GLI-2017 for DLCO.  The DLCO was uncorrected for Hgb.  IVC is less than 90%.    T%; DLCO:113%.    Interpretation;   Normal pulmonary function and gas transfer.  Normal flow volume loop.  Bronchodilators were not provided.

## 2021-06-16 ENCOUNTER — OFFICE VISIT (OUTPATIENT)
Dept: SLEEP MEDICINE | Facility: MEDICAL CENTER | Age: 61
End: 2021-06-16
Payer: COMMERCIAL

## 2021-06-16 VITALS
HEART RATE: 86 BPM | SYSTOLIC BLOOD PRESSURE: 146 MMHG | TEMPERATURE: 97.5 F | DIASTOLIC BLOOD PRESSURE: 74 MMHG | BODY MASS INDEX: 26.73 KG/M2 | WEIGHT: 181 LBS | RESPIRATION RATE: 16 BRPM | OXYGEN SATURATION: 94 %

## 2021-06-16 DIAGNOSIS — K21.9 CHRONIC GERD: ICD-10-CM

## 2021-06-16 DIAGNOSIS — R09.82 PND (POST-NASAL DRIP): ICD-10-CM

## 2021-06-16 DIAGNOSIS — J45.909 MILD REACTIVE AIRWAYS DISEASE, UNSPECIFIED WHETHER PERSISTENT: ICD-10-CM

## 2021-06-16 DIAGNOSIS — J84.9 ILD (INTERSTITIAL LUNG DISEASE) (HCC): ICD-10-CM

## 2021-06-16 PROCEDURE — 99213 OFFICE O/P EST LOW 20 MIN: CPT | Performed by: INTERNAL MEDICINE

## 2021-06-16 ASSESSMENT — ENCOUNTER SYMPTOMS
HEARTBURN: 0
CHILLS: 0
HEADACHES: 0
DIZZINESS: 0
BLURRED VISION: 0
EYE REDNESS: 0
SPEECH CHANGE: 0
PHOTOPHOBIA: 0
NAUSEA: 0
PALPITATIONS: 0
DOUBLE VISION: 0
DIARRHEA: 0
FEVER: 0
VOMITING: 0
FOCAL WEAKNESS: 0
FALLS: 0
PND: 0
EYE DISCHARGE: 0
COUGH: 0
ABDOMINAL PAIN: 0
CONSTIPATION: 0
HEMOPTYSIS: 0
MYALGIAS: 0
NECK PAIN: 0
TREMORS: 0
CLAUDICATION: 0
SPUTUM PRODUCTION: 0
SINUS PAIN: 0
SHORTNESS OF BREATH: 0
DIAPHORESIS: 0
WHEEZING: 0
ORTHOPNEA: 0
BACK PAIN: 0
DEPRESSION: 0
EYE PAIN: 0
SORE THROAT: 0
WEIGHT LOSS: 0
WEAKNESS: 0
STRIDOR: 0

## 2021-06-16 ASSESSMENT — FIBROSIS 4 INDEX: FIB4 SCORE: 1.03

## 2021-06-16 NOTE — PROGRESS NOTES
Chief Complaint   Patient presents with   • Follow-Up     ILD last seen 12/15/2020    • Results     PFT 6/15/21         HPI: This patient is a 60 y.o. female whom is followed in our clinic for interstitial process and RAD last seen by me on 12/15/20. The patient's past medical history is significant for hypertension, prediabetes currently controlled with diet, history of obesity treated with Kennedy-en-Y gastric bypass.  The patient has had intermittent episodes of subacute onset hypoxic respiratory failure since April of last year during which she required ICU admission with acute respiratory failure but did not require intubation. Work up has included serial imaging which has never definitively shown ILD although her interstitial process appears to be responsive to prednisone, bronchoscopy which was unrevealing and more recently evaluation at [facility] in ILD clinic where it was felt she was likely having recurrent aspiration events. She did require supplemental O2 for some time but has been off now including at night for roughly 5 months. She is on flovent and intranasal steroids for RAD and PND respectively. PFts from 10/2019 showed mild restriction on air flows with FVC of 66% pred and FEV! Of 74% predicted with no BD response, normal TLC and normal DLCO. Her last CT chest from June showed improved GGO with small residual GGO in RLL.  She was actually seen by her bariatric surgeon to consider reversal given concern for reflux but pulmonary issues stabilized and she is actually done well for the past year.  No recurrent episodes of hypoxia.  She had updated pulmonary function testing on Arti 15 which showed normalization of forced vital capacity from 66% previously to 3.28 L or 87% predicted.  Normal FEV1.  Total lung capacity was previously 93% predicted and currently 97% predicted.  DLCO is 113% predicted.  She is walking regularly.  She remains on Flovent 220, montelukast and pantoprazole.  She does not use her  rescue inhaler.  She has plans to relocate to [facility] next month.    Past Medical History:   Diagnosis Date   • Asthma    • Diabetes (HCC)    • Hyperlipidemia    • Hypertension    • Thyroid disease        Social History     Socioeconomic History   • Marital status:      Spouse name: Not on file   • Number of children: Not on file   • Years of education: Not on file   • Highest education level: Not on file   Occupational History   • Not on file   Tobacco Use   • Smoking status: Former Smoker     Packs/day: 1.00     Years: 20.00     Pack years: 20.00     Types: Cigarettes     Quit date: 1994     Years since quittin.4   • Smokeless tobacco: Never Used   Vaping Use   • Vaping Use: Never used   Substance and Sexual Activity   • Alcohol use: Yes     Alcohol/week: 0.6 - 1.2 oz     Types: 1 - 2 Shots of liquor per week     Comment: 1-2x per week, 1-2 drinks   • Drug use: Not Currently     Types: Marijuana, Oral     Comment: edibled   • Sexual activity: Yes     Partners: Male     Comment: Work from home   Other Topics Concern   • Not on file   Social History Narrative   • Not on file     Social Determinants of Health     Financial Resource Strain:    • Difficulty of Paying Living Expenses:    Food Insecurity:    • Worried About Running Out of Food in the Last Year:    • Ran Out of Food in the Last Year:    Transportation Needs:    • Lack of Transportation (Medical):    • Lack of Transportation (Non-Medical):    Physical Activity:    • Days of Exercise per Week:    • Minutes of Exercise per Session:    Stress:    • Feeling of Stress :    Social Connections:    • Frequency of Communication with Friends and Family:    • Frequency of Social Gatherings with Friends and Family:    • Attends Rastafarian Services:    • Active Member of Clubs or Organizations:    • Attends Club or Organization Meetings:    • Marital Status:    Intimate Partner Violence:    • Fear of Current or Ex-Partner:    • Emotionally  Abused:    • Physically Abused:    • Sexually Abused:        Family History   Problem Relation Age of Onset   • Cancer Mother 55        breast   • Heart Disease Mother         MI   • Cancer Maternal Grandmother         breast   • Cancer Sister         breast   • Diabetes Father    • Cancer Father         SCC lung       Current Outpatient Medications on File Prior to Visit   Medication Sig Dispense Refill   • omeprazole (PRILOSEC) 40 MG delayed-release capsule Take 1 capsule by mouth every day. 90 capsule 3   • FLOVENT  MCG/ACT Aerosol INHALE 2 PUFFS BY MOUTH TWICE DAILY WITH SPACER, RINSE MOUTH AFTER EACH USE 1 Each 5   • montelukast (SINGULAIR) 10 MG Tab TAKE 1 TABLET BY MOUTH EVERY NIGHT AT BEDTIME 30 tablet 11   • levothyroxine (SYNTHROID) 100 MCG Tab TAKE 1 TABLET BY MOUTH EVERY MORNING ON AN EMPTY STOMACH 90 tablet 3   • lisinopril (PRINIVIL) 20 MG Tab Take 1 Tab by mouth every day. 90 Tab 3   • Multiple Vitamins-Minerals (ZINC PO) Take 35 mg by mouth every day.     • Coenzyme Q10 (COQ-10 PO) Take  by mouth.     • ibuprofen (MOTRIN) 200 MG Tab Take 400 mg by mouth every 6 hours as needed for Mild Pain.     • CALCIUM PO Take 1 Tab by mouth every day.     • IRON PO Take 1 Tab by mouth every evening.     • Ascorbic Acid (VITAMIN C PO) Take 1 Tab by mouth every evening.     • albuterol 108 (90 Base) MCG/ACT Aero Soln inhalation aerosol Inhale 2 Puffs by mouth every 6 hours as needed for Shortness of Breath.     • Omega-3 Fatty Acids (FISH OIL) 1000 MG Cap capsule Take 1,000 mg by mouth every day.     • vitamin D (CHOLECALCIFEROL) 1000 UNIT Tab Take 1,000 Units by mouth every day.     • therapeutic multivitamin-minerals (THERAGRAN-M) Tab Take 1 Tab by mouth every day.     • magnesium gluconate (MAG-G) 500 MG tablet Take 500 mg by mouth every day.     • cyanocobalamin (VITAMIN B-12) 500 MCG Tab Take 500 mcg by mouth every day.       No current facility-administered medications on file prior to visit.        Codeine and Sulfa drugs      ROS:   Review of Systems   Constitutional: Negative for chills, diaphoresis, fever, malaise/fatigue and weight loss.   HENT: Negative for congestion, ear discharge, ear pain, hearing loss, nosebleeds, sinus pain, sore throat and tinnitus.    Eyes: Negative for blurred vision, double vision, photophobia, pain, discharge and redness.   Respiratory: Negative for cough, hemoptysis, sputum production, shortness of breath, wheezing and stridor.    Cardiovascular: Negative for chest pain, palpitations, orthopnea, claudication, leg swelling and PND.   Gastrointestinal: Negative for abdominal pain, constipation, diarrhea, heartburn, nausea and vomiting.   Genitourinary: Negative for dysuria and urgency.   Musculoskeletal: Negative for back pain, falls, joint pain, myalgias and neck pain.   Skin: Negative for itching and rash.   Neurological: Negative for dizziness, tremors, speech change, focal weakness, weakness and headaches.   Endo/Heme/Allergies: Negative for environmental allergies.   Psychiatric/Behavioral: Negative for depression.       /74 (BP Location: Right arm, Patient Position: Sitting, BP Cuff Size: Adult)   Pulse 86   Temp 36.4 °C (97.5 °F) (Temporal)   Resp 16   Wt 82.1 kg (181 lb)   SpO2 94%   Physical Exam  Vitals reviewed.   Constitutional:       General: She is not in acute distress.     Appearance: Normal appearance. She is well-developed and normal weight.   HENT:      Head: Normocephalic and atraumatic.      Right Ear: External ear normal.      Left Ear: External ear normal.      Nose: Nose normal. No congestion.      Mouth/Throat:      Mouth: Mucous membranes are moist.      Pharynx: Oropharynx is clear. No oropharyngeal exudate.   Eyes:      General: No scleral icterus.     Extraocular Movements: Extraocular movements intact.      Conjunctiva/sclera: Conjunctivae normal.      Pupils: Pupils are equal, round, and reactive to light.   Neck:      Vascular: No  JVD.      Trachea: No tracheal deviation.   Cardiovascular:      Rate and Rhythm: Normal rate and regular rhythm.      Heart sounds: Normal heart sounds. No murmur heard.   No friction rub. No gallop.    Pulmonary:      Effort: Pulmonary effort is normal. No accessory muscle usage or respiratory distress.      Breath sounds: No wheezing or rales.      Comments: Late inspiratory crackles heard bilateral lung bases with inspiratory squeak left upper lobe  Abdominal:      General: There is no distension.      Palpations: Abdomen is soft.      Tenderness: There is no abdominal tenderness.   Musculoskeletal:         General: No tenderness or deformity. Normal range of motion.      Cervical back: Normal range of motion and neck supple.      Right lower leg: No edema.      Left lower leg: No edema.   Lymphadenopathy:      Cervical: No cervical adenopathy.   Skin:     General: Skin is warm and dry.      Findings: No rash.      Nails: There is no clubbing.   Neurological:      Mental Status: She is alert and oriented to person, place, and time.      Cranial Nerves: No cranial nerve deficit.      Gait: Gait normal.   Psychiatric:         Mood and Affect: Mood normal.         Behavior: Behavior normal.         PFTs as reviewed by me personally: As per HPI    Imaging as reviewed by me personally: As per HPI    Assessment:  1. ILD (interstitial lung disease) (HCC)     2. Chronic GERD     3. PND (post-nasal drip)     4. Mild reactive airways disease, unspecified whether persistent         Plan:  1.  Patient does not have true interstitial lung disease in the sense that there is a permanent issue with her lungs however she had recurrent episodes of acute hypoxic respiratory failure and does have persistently abnormal exam that is suggestive of interstitial process although pulmonary function testing has essentially completely normalized.  She is following lifestyle modifications for prevention and minimization of reflux as well as  on proton pump inhibitor.  We have been treating her for reactive airways disease and symptoms are well controlled.  I do think she should continue to follow-up with a pulmonologist when she relocates to [place] and we can assist in finding a provider.  I see no clear indication to repeat imaging unless symptoms recur or PFTs worsen.  2.  Patient following lifestyle modifications to minimize reflux.  This remains a possible etiology for her recurrent disease previously.  She is on proton pump inhibitor.  3.  Well-controlled on Flonase.  4.  Well-controlled on Flovent and montelukast.  I would like to start by decreasing her inhaled corticosteroid dose to 110 which she will accomplished with 1 puff twice daily of her 220.  Continue montelukast and short acting bronchodilator as needed.  She is up-to-date on vaccines.  Establish care with pulmonology when she arrives in [place].  Return if symptoms worsen or fail to improve.

## 2021-12-09 NOTE — CARE PLAN
Problem: Respiratory:  Goal: Respiratory status will improve  Outcome: PROGRESSING SLOWER THAN EXPECTED  Patient arrived to ICU on 6L oxymask. Was transitioned to high flow NC per RT to maintain O2 sat > 90%.     Problem: Infection  Goal: Will remain free from infection  Outcome: PROGRESSING SLOWER THAN EXPECTED  Patient admitted for active pneumonia. Education provided. Lactic acids will decrease to normal values.        gait training/transfer training

## 2025-01-03 ENCOUNTER — PATIENT MESSAGE (OUTPATIENT)
Dept: HEALTH INFORMATION MANAGEMENT | Facility: OTHER | Age: 65
End: 2025-01-03

## (undated) DEVICE — TUBING CLEARLINK DUO-VENT - C-FLO (48EA/CA)

## (undated) DEVICE — GOWN SURGEONS X-LARGE - DISP. (30/CA)

## (undated) DEVICE — SPONGE GAUZE NON-STERILE 4X4 - (2000/CA 10PK/CA)

## (undated) DEVICE — BASIN EMESIS DISP. - (250/CA)

## (undated) DEVICE — SYRINGE SAFETY 3 ML 18 GA X 1 1/2 BLUNT LL (100/BX 8BX/CA)

## (undated) DEVICE — GLOVE BIOGEL SZ 8 SURGICAL PF LTX - (50PR/BX 4BX/CA)

## (undated) DEVICE — TUBE CONNECTING SUCTION - CLEAR PLASTIC STERILE 72 IN (50EA/CA)

## (undated) DEVICE — KIT  I.V. START (100EA/CA)

## (undated) DEVICE — GLOVE, LITE (PAIR)

## (undated) DEVICE — SYRINGE 20 ML LS (48/BX 4BX/CA)

## (undated) DEVICE — NEPTUNE 4 PORT MANIFOLD - (20/PK)

## (undated) DEVICE — TOWELS CLOTH SURGICAL - (4/PK 20PK/CA)

## (undated) DEVICE — SENSOR SPO2 NEO LNCS ADHESIVE (20/BX) SEE USER NOTES

## (undated) DEVICE — ELECTRODE 850 FOAM ADHESIVE - HYDROGEL RADIOTRNSPRNT (50/PK)

## (undated) DEVICE — CANISTER SUCTION RIGID RED 1500CC (40EA/CA)

## (undated) DEVICE — CANNULA W/ SUPPLY TUBING O2 - (50/CA)

## (undated) DEVICE — SYRINGE 12 CC LUER TIP - (80/BX) OBSOLETE ITEM